# Patient Record
Sex: MALE | Race: WHITE | HISPANIC OR LATINO | URBAN - METROPOLITAN AREA
[De-identification: names, ages, dates, MRNs, and addresses within clinical notes are randomized per-mention and may not be internally consistent; named-entity substitution may affect disease eponyms.]

---

## 2019-05-11 ENCOUNTER — EMERGENCY (EMERGENCY)
Facility: HOSPITAL | Age: 84
LOS: 0 days | Discharge: HOME | End: 2019-05-11
Attending: EMERGENCY MEDICINE | Admitting: EMERGENCY MEDICINE
Payer: MEDICARE

## 2019-05-11 ENCOUNTER — TRANSCRIPTION ENCOUNTER (OUTPATIENT)
Age: 84
End: 2019-05-11

## 2019-05-11 VITALS
RESPIRATION RATE: 18 BRPM | SYSTOLIC BLOOD PRESSURE: 168 MMHG | HEART RATE: 66 BPM | TEMPERATURE: 98 F | DIASTOLIC BLOOD PRESSURE: 93 MMHG | OXYGEN SATURATION: 100 % | WEIGHT: 141.98 LBS | HEIGHT: 64 IN

## 2019-05-11 VITALS
DIASTOLIC BLOOD PRESSURE: 87 MMHG | HEART RATE: 64 BPM | RESPIRATION RATE: 18 BRPM | SYSTOLIC BLOOD PRESSURE: 151 MMHG | OXYGEN SATURATION: 100 % | TEMPERATURE: 98 F
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
## 2019-05-11 DIAGNOSIS — Z88.0 ALLERGY STATUS TO PENICILLIN: ICD-10-CM

## 2019-05-11 DIAGNOSIS — Z88.3 ALLERGY STATUS TO OTHER ANTI-INFECTIVE AGENTS: ICD-10-CM

## 2019-05-11 DIAGNOSIS — S09.90XA UNSPECIFIED INJURY OF HEAD, INITIAL ENCOUNTER: ICD-10-CM

## 2019-05-11 DIAGNOSIS — S59.902A UNSPECIFIED INJURY OF LEFT ELBOW, INITIAL ENCOUNTER: ICD-10-CM
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
## 2019-05-11 PROCEDURE — 72125 CT NECK SPINE W/O DYE: CPT | Mod: 26

## 2019-05-11 PROCEDURE — 70450 CT HEAD/BRAIN W/O DYE: CPT | Mod: 26

## 2019-05-11 PROCEDURE — 99284 EMERGENCY DEPT VISIT MOD MDM: CPT

## 2019-05-11 NOTE — ED ADULT NURSE NOTE - OBJECTIVE STATEMENT
Patient presents to ED complaining of fall. Patient had mechanical fall getting his mail this morning, patient states he fell on R side. Patient has skin abrasion to R forearm. Patient denies LOC, hitting head, dizziness, SOB, CP. Patient was seen in urgent care, sent to ED for further evaluation. A&0x3, ambulates without assistance, family at bedside

## 2019-05-11 NOTE — ED PROVIDER NOTE - OBJECTIVE STATEMENT
89 y/o M with no pertinent PMHx presenting to the ED s/p fall that occurred today.  +left arm pain. In the morning he wanted to get the mail,  when he was on his way he tripped backwards on the steps and hit his head and left arm. No LOC He  Was able to stand up and ambulate back inside, tell his son what happened. Patient soon after  went to urgent care and had wound on his left arm addressed, but they sent him here for evaluation of the head.  Patient is unsure if he is on blood thinners, but he does not believe he is. Denies any numbness, pain in any extremities. Tetanus UTD. PCD: Dr. Vela

## 2019-05-11 NOTE — ED ADULT TRIAGE NOTE - CHIEF COMPLAINT QUOTE
Pt sent from urgent care for evaluation for CT scan after trip/fall while getting the mail today.  Pt was treated for abrasion to right arm and advised to go to ED for CT scan of head. Pt is not currently on any anti-coagulation and denies LOC after the fall.

## 2019-05-11 NOTE — ED ADULT NURSE NOTE - NSIMPLEMENTINTERV_GEN_ALL_ED
Implemented All Fall with Harm Risk Interventions:  [place] to call system. Call bell, personal items and telephone within reach. Instruct patient to call for assistance. Room bathroom lighting operational. Non-slip footwear when patient is off stretcher. Physically safe environment: no spills, clutter or unnecessary equipment. Stretcher in lowest position, wheels locked, appropriate side rails in place. Provide visual cue, wrist band, yellow gown, etc. Monitor gait and stability. Monitor for mental status changes and reorient to person, place, and time. Review medications for side effects contributing to fall risk. Reinforce activity limits and safety measures with patient and family. Provide visual clues: red socks.

## 2019-05-11 NOTE — ED PROVIDER NOTE - NS_ ATTENDINGSCRIBEDETAILS _ED_A_ED_FT
I, Chris Hoawrd MD,  performed the initial face to face bedside interview with this patient regarding history of present illness, review of symptoms and relevant past medical, social and family history.  I completed an independent physical examination.    The history, relevant review of systems, past medical and surgical history, medical decision making, and physical examination was documented by the scribe in my presence and I attest to the accuracy of the documentation.

## 2019-06-01 ENCOUNTER — OUTPATIENT (OUTPATIENT)
Dept: OUTPATIENT SERVICES | Facility: HOSPITAL | Age: 84
LOS: 1 days | End: 2019-06-01
Payer: MEDICARE

## 2019-06-01 PROCEDURE — G9001: CPT

## 2019-06-10 DIAGNOSIS — Z71.89 OTHER SPECIFIED COUNSELING: ICD-10-CM

## 2021-08-13 ENCOUNTER — INPATIENT (INPATIENT)
Facility: HOSPITAL | Age: 86
LOS: 11 days | Discharge: NURSING, REHAB OR LONG-TERM CARE | DRG: 853 | End: 2021-08-25
Attending: STUDENT IN AN ORGANIZED HEALTH CARE EDUCATION/TRAINING PROGRAM | Admitting: FAMILY MEDICINE
Payer: MEDICARE

## 2021-08-13 VITALS
DIASTOLIC BLOOD PRESSURE: 56 MMHG | TEMPERATURE: 100 F | RESPIRATION RATE: 17 BRPM | HEART RATE: 77 BPM | WEIGHT: 160.06 LBS | HEIGHT: 64 IN | SYSTOLIC BLOOD PRESSURE: 133 MMHG | OXYGEN SATURATION: 97 %

## 2021-08-13 DIAGNOSIS — I10 ESSENTIAL (PRIMARY) HYPERTENSION: ICD-10-CM

## 2021-08-13 DIAGNOSIS — K44.9 DIAPHRAGMATIC HERNIA WITHOUT OBSTRUCTION OR GANGRENE: ICD-10-CM

## 2021-08-13 DIAGNOSIS — R50.9 FEVER, UNSPECIFIED: ICD-10-CM

## 2021-08-13 DIAGNOSIS — Z87.438 PERSONAL HISTORY OF OTHER DISEASES OF MALE GENITAL ORGANS: ICD-10-CM

## 2021-08-13 DIAGNOSIS — R33.9 RETENTION OF URINE, UNSPECIFIED: ICD-10-CM

## 2021-08-13 DIAGNOSIS — E78.5 HYPERLIPIDEMIA, UNSPECIFIED: ICD-10-CM

## 2021-08-13 DIAGNOSIS — G30.9 ALZHEIMER'S DISEASE, UNSPECIFIED: ICD-10-CM

## 2021-08-13 DIAGNOSIS — Z98.890 OTHER SPECIFIED POSTPROCEDURAL STATES: Chronic | ICD-10-CM

## 2021-08-13 DIAGNOSIS — R00.1 BRADYCARDIA, UNSPECIFIED: ICD-10-CM

## 2021-08-13 DIAGNOSIS — E11.9 TYPE 2 DIABETES MELLITUS WITHOUT COMPLICATIONS: ICD-10-CM

## 2021-08-13 LAB
ALBUMIN SERPL ELPH-MCNC: 3.1 G/DL — LOW (ref 3.3–5)
ALP SERPL-CCNC: 53 U/L — SIGNIFICANT CHANGE UP (ref 40–120)
ALT FLD-CCNC: 32 U/L — SIGNIFICANT CHANGE UP (ref 12–78)
ANION GAP SERPL CALC-SCNC: 3 MMOL/L — LOW (ref 5–17)
APPEARANCE UR: CLEAR — SIGNIFICANT CHANGE UP
APTT BLD: 30 SEC — SIGNIFICANT CHANGE UP (ref 27.5–35.5)
AST SERPL-CCNC: 30 U/L — SIGNIFICANT CHANGE UP (ref 15–37)
BACTERIA # UR AUTO: NEGATIVE — SIGNIFICANT CHANGE UP
BASOPHILS # BLD AUTO: 0.02 K/UL — SIGNIFICANT CHANGE UP (ref 0–0.2)
BASOPHILS NFR BLD AUTO: 0.2 % — SIGNIFICANT CHANGE UP (ref 0–2)
BILIRUB SERPL-MCNC: 0.6 MG/DL — SIGNIFICANT CHANGE UP (ref 0.2–1.2)
BILIRUB UR-MCNC: NEGATIVE — SIGNIFICANT CHANGE UP
BUN SERPL-MCNC: 32 MG/DL — HIGH (ref 7–23)
CALCIUM SERPL-MCNC: 9.6 MG/DL — SIGNIFICANT CHANGE UP (ref 8.5–10.1)
CHLORIDE SERPL-SCNC: 108 MMOL/L — SIGNIFICANT CHANGE UP (ref 96–108)
CO2 SERPL-SCNC: 26 MMOL/L — SIGNIFICANT CHANGE UP (ref 22–31)
COLOR SPEC: YELLOW — SIGNIFICANT CHANGE UP
CREAT SERPL-MCNC: 1.27 MG/DL — SIGNIFICANT CHANGE UP (ref 0.5–1.3)
DIFF PNL FLD: ABNORMAL
EOSINOPHIL # BLD AUTO: 0 K/UL — SIGNIFICANT CHANGE UP (ref 0–0.5)
EOSINOPHIL NFR BLD AUTO: 0 % — SIGNIFICANT CHANGE UP (ref 0–6)
EPI CELLS # UR: NEGATIVE — SIGNIFICANT CHANGE UP
GLUCOSE SERPL-MCNC: 106 MG/DL — HIGH (ref 70–99)
GLUCOSE UR QL: NEGATIVE MG/DL — SIGNIFICANT CHANGE UP
HCT VFR BLD CALC: 35.8 % — LOW (ref 39–50)
HGB BLD-MCNC: 12 G/DL — LOW (ref 13–17)
IMM GRANULOCYTES NFR BLD AUTO: 1.6 % — HIGH (ref 0–1.5)
INR BLD: 1.2 RATIO — HIGH (ref 0.88–1.16)
KETONES UR-MCNC: NEGATIVE — SIGNIFICANT CHANGE UP
LACTATE SERPL-SCNC: 1.6 MMOL/L — SIGNIFICANT CHANGE UP (ref 0.7–2)
LEUKOCYTE ESTERASE UR-ACNC: NEGATIVE — SIGNIFICANT CHANGE UP
LYMPHOCYTES # BLD AUTO: 0.23 K/UL — LOW (ref 1–3.3)
LYMPHOCYTES # BLD AUTO: 1.9 % — LOW (ref 13–44)
MCHC RBC-ENTMCNC: 33.4 PG — SIGNIFICANT CHANGE UP (ref 27–34)
MCHC RBC-ENTMCNC: 33.5 GM/DL — SIGNIFICANT CHANGE UP (ref 32–36)
MCV RBC AUTO: 99.7 FL — SIGNIFICANT CHANGE UP (ref 80–100)
MONOCYTES # BLD AUTO: 0.64 K/UL — SIGNIFICANT CHANGE UP (ref 0–0.9)
MONOCYTES NFR BLD AUTO: 5.2 % — SIGNIFICANT CHANGE UP (ref 2–14)
NEUTROPHILS # BLD AUTO: 11.3 K/UL — HIGH (ref 1.8–7.4)
NEUTROPHILS NFR BLD AUTO: 91.1 % — HIGH (ref 43–77)
NITRITE UR-MCNC: NEGATIVE — SIGNIFICANT CHANGE UP
NT-PROBNP SERPL-SCNC: 410 PG/ML — SIGNIFICANT CHANGE UP (ref 0–450)
PH UR: 6 — SIGNIFICANT CHANGE UP (ref 5–8)
PLATELET # BLD AUTO: 126 K/UL — LOW (ref 150–400)
POTASSIUM SERPL-MCNC: 4 MMOL/L — SIGNIFICANT CHANGE UP (ref 3.5–5.3)
POTASSIUM SERPL-SCNC: 4 MMOL/L — SIGNIFICANT CHANGE UP (ref 3.5–5.3)
PROT SERPL-MCNC: 6.3 GM/DL — SIGNIFICANT CHANGE UP (ref 6–8.3)
PROT UR-MCNC: 30 MG/DL
PROTHROM AB SERPL-ACNC: 13.8 SEC — HIGH (ref 10.6–13.6)
RAPID RVP RESULT: SIGNIFICANT CHANGE UP
RBC # BLD: 3.59 M/UL — LOW (ref 4.2–5.8)
RBC # FLD: 13 % — SIGNIFICANT CHANGE UP (ref 10.3–14.5)
RBC CASTS # UR COMP ASSIST: ABNORMAL /HPF (ref 0–4)
SARS-COV-2 RNA SPEC QL NAA+PROBE: SIGNIFICANT CHANGE UP
SODIUM SERPL-SCNC: 137 MMOL/L — SIGNIFICANT CHANGE UP (ref 135–145)
SP GR SPEC: 1.01 — SIGNIFICANT CHANGE UP (ref 1.01–1.02)
TROPONIN I SERPL-MCNC: <0.015 NG/ML — SIGNIFICANT CHANGE UP (ref 0.01–0.04)
UROBILINOGEN FLD QL: NEGATIVE MG/DL — SIGNIFICANT CHANGE UP
WBC # BLD: 12.39 K/UL — HIGH (ref 3.8–10.5)
WBC # FLD AUTO: 12.39 K/UL — HIGH (ref 3.8–10.5)
WBC UR QL: NEGATIVE — SIGNIFICANT CHANGE UP

## 2021-08-13 PROCEDURE — 82962 GLUCOSE BLOOD TEST: CPT

## 2021-08-13 PROCEDURE — 99223 1ST HOSP IP/OBS HIGH 75: CPT

## 2021-08-13 PROCEDURE — 74177 CT ABD & PELVIS W/CONTRAST: CPT | Mod: 26,MA

## 2021-08-13 PROCEDURE — 85610 PROTHROMBIN TIME: CPT

## 2021-08-13 PROCEDURE — 80048 BASIC METABOLIC PNL TOTAL CA: CPT

## 2021-08-13 PROCEDURE — 86140 C-REACTIVE PROTEIN: CPT

## 2021-08-13 PROCEDURE — 73600 X-RAY EXAM OF ANKLE: CPT | Mod: LT

## 2021-08-13 PROCEDURE — 93005 ELECTROCARDIOGRAM TRACING: CPT

## 2021-08-13 PROCEDURE — 86850 RBC ANTIBODY SCREEN: CPT

## 2021-08-13 PROCEDURE — 85730 THROMBOPLASTIN TIME PARTIAL: CPT

## 2021-08-13 PROCEDURE — 71045 X-RAY EXAM CHEST 1 VIEW: CPT

## 2021-08-13 PROCEDURE — 85027 COMPLETE CBC AUTOMATED: CPT

## 2021-08-13 PROCEDURE — 84145 PROCALCITONIN (PCT): CPT

## 2021-08-13 PROCEDURE — 87040 BLOOD CULTURE FOR BACTERIA: CPT

## 2021-08-13 PROCEDURE — 86901 BLOOD TYPING SEROLOGIC RH(D): CPT

## 2021-08-13 PROCEDURE — 86769 SARS-COV-2 COVID-19 ANTIBODY: CPT

## 2021-08-13 PROCEDURE — 87635 SARS-COV-2 COVID-19 AMP PRB: CPT

## 2021-08-13 PROCEDURE — 99285 EMERGENCY DEPT VISIT HI MDM: CPT | Mod: CS

## 2021-08-13 PROCEDURE — 93010 ELECTROCARDIOGRAM REPORT: CPT

## 2021-08-13 PROCEDURE — C1889: CPT

## 2021-08-13 PROCEDURE — 86900 BLOOD TYPING SEROLOGIC ABO: CPT

## 2021-08-13 PROCEDURE — 71045 X-RAY EXAM CHEST 1 VIEW: CPT | Mod: 26

## 2021-08-13 PROCEDURE — 97530 THERAPEUTIC ACTIVITIES: CPT | Mod: GP

## 2021-08-13 PROCEDURE — 33208 INSRT HEART PM ATRIAL & VENT: CPT | Mod: KX

## 2021-08-13 PROCEDURE — C1898: CPT

## 2021-08-13 PROCEDURE — 97116 GAIT TRAINING THERAPY: CPT | Mod: GP

## 2021-08-13 PROCEDURE — 70450 CT HEAD/BRAIN W/O DYE: CPT | Mod: 26,MA

## 2021-08-13 PROCEDURE — 84550 ASSAY OF BLOOD/URIC ACID: CPT

## 2021-08-13 PROCEDURE — 85025 COMPLETE CBC W/AUTO DIFF WBC: CPT

## 2021-08-13 PROCEDURE — 93306 TTE W/DOPPLER COMPLETE: CPT

## 2021-08-13 PROCEDURE — C1785: CPT

## 2021-08-13 PROCEDURE — 93971 EXTREMITY STUDY: CPT | Mod: LT

## 2021-08-13 PROCEDURE — 92610 EVALUATE SWALLOWING FUNCTION: CPT | Mod: GN

## 2021-08-13 PROCEDURE — 97162 PT EVAL MOD COMPLEX 30 MIN: CPT | Mod: GP

## 2021-08-13 PROCEDURE — C1894: CPT

## 2021-08-13 PROCEDURE — 85652 RBC SED RATE AUTOMATED: CPT

## 2021-08-13 PROCEDURE — 84443 ASSAY THYROID STIM HORMONE: CPT

## 2021-08-13 PROCEDURE — 71250 CT THORAX DX C-: CPT

## 2021-08-13 PROCEDURE — 71250 CT THORAX DX C-: CPT | Mod: 26

## 2021-08-13 PROCEDURE — 80053 COMPREHEN METABOLIC PANEL: CPT

## 2021-08-13 PROCEDURE — 36415 COLL VENOUS BLD VENIPUNCTURE: CPT

## 2021-08-13 RX ORDER — ACETAMINOPHEN 500 MG
650 TABLET ORAL ONCE
Refills: 0 | Status: COMPLETED | OUTPATIENT
Start: 2021-08-13 | End: 2021-08-13

## 2021-08-13 RX ORDER — METOCLOPRAMIDE HCL 10 MG
10 TABLET ORAL ONCE
Refills: 0 | Status: COMPLETED | OUTPATIENT
Start: 2021-08-13 | End: 2021-08-13

## 2021-08-13 RX ORDER — CEFTRIAXONE 500 MG/1
1000 INJECTION, POWDER, FOR SOLUTION INTRAMUSCULAR; INTRAVENOUS ONCE
Refills: 0 | Status: COMPLETED | OUTPATIENT
Start: 2021-08-13 | End: 2021-08-13

## 2021-08-13 RX ORDER — TIMOLOL 0.5 %
1 DROPS OPHTHALMIC (EYE)
Refills: 0 | Status: DISCONTINUED | OUTPATIENT
Start: 2021-08-13 | End: 2021-08-25

## 2021-08-13 RX ORDER — SODIUM CHLORIDE 9 MG/ML
1000 INJECTION INTRAMUSCULAR; INTRAVENOUS; SUBCUTANEOUS ONCE
Refills: 0 | Status: COMPLETED | OUTPATIENT
Start: 2021-08-13 | End: 2021-08-13

## 2021-08-13 RX ORDER — ATORVASTATIN CALCIUM 80 MG/1
40 TABLET, FILM COATED ORAL AT BEDTIME
Refills: 0 | Status: DISCONTINUED | OUTPATIENT
Start: 2021-08-13 | End: 2021-08-25

## 2021-08-13 RX ORDER — DIPHENHYDRAMINE HCL 50 MG
25 CAPSULE ORAL ONCE
Refills: 0 | Status: COMPLETED | OUTPATIENT
Start: 2021-08-13 | End: 2021-08-13

## 2021-08-13 RX ORDER — ENOXAPARIN SODIUM 100 MG/ML
40 INJECTION SUBCUTANEOUS DAILY
Refills: 0 | Status: DISCONTINUED | OUTPATIENT
Start: 2021-08-13 | End: 2021-08-25

## 2021-08-13 RX ORDER — AMLODIPINE BESYLATE 2.5 MG/1
5 TABLET ORAL DAILY
Refills: 0 | Status: DISCONTINUED | OUTPATIENT
Start: 2021-08-13 | End: 2021-08-25

## 2021-08-13 RX ORDER — AZITHROMYCIN 500 MG/1
500 TABLET, FILM COATED ORAL ONCE
Refills: 0 | Status: COMPLETED | OUTPATIENT
Start: 2021-08-13 | End: 2021-08-13

## 2021-08-13 RX ORDER — VALSARTAN 80 MG/1
160 TABLET ORAL DAILY
Refills: 0 | Status: DISCONTINUED | OUTPATIENT
Start: 2021-08-13 | End: 2021-08-25

## 2021-08-13 RX ORDER — FINASTERIDE 5 MG/1
5 TABLET, FILM COATED ORAL DAILY
Refills: 0 | Status: DISCONTINUED | OUTPATIENT
Start: 2021-08-13 | End: 2021-08-25

## 2021-08-13 RX ADMIN — AZITHROMYCIN 255 MILLIGRAM(S): 500 TABLET, FILM COATED ORAL at 22:27

## 2021-08-13 RX ADMIN — SODIUM CHLORIDE 1000 MILLILITER(S): 9 INJECTION INTRAMUSCULAR; INTRAVENOUS; SUBCUTANEOUS at 18:02

## 2021-08-13 RX ADMIN — SODIUM CHLORIDE 1000 MILLILITER(S): 9 INJECTION INTRAMUSCULAR; INTRAVENOUS; SUBCUTANEOUS at 22:18

## 2021-08-13 RX ADMIN — Medication 650 MILLIGRAM(S): at 19:22

## 2021-08-13 RX ADMIN — Medication 650 MILLIGRAM(S): at 18:02

## 2021-08-13 RX ADMIN — CEFTRIAXONE 1000 MILLIGRAM(S): 500 INJECTION, POWDER, FOR SOLUTION INTRAMUSCULAR; INTRAVENOUS at 22:27

## 2021-08-13 NOTE — H&P ADULT - NSICDXPASTMEDICALHX_GEN_ALL_CORE_FT
PAST MEDICAL HISTORY:  Bradycardia     OLIVER (dementia of Alzheimer type)     Diabetes     Glaucoma     History of BPH     HLD (hyperlipidemia)     HTN (hypertension)

## 2021-08-13 NOTE — ED PROVIDER NOTE - PROGRESS NOTE DETAILS
No obvious infectious source.  Will cover for pna, although cxr unclear.  Given AMS and febrile illness will admit for further w/u and management.  Not suspicious for meningitis as no reported ha, no meningeal signs, no rash, and not toxic appearing.  Further care per inpatient treatment team.

## 2021-08-13 NOTE — H&P ADULT - ASSESSMENT
89 y.o. male with PMHx of mild OLIVER, HTN, HLD, diet controlled DMII, BPH, Glaucoma p/w weakness since this am - progressed through the day to the point was hardly able to move - brought in by daughter and found to have fever 100.4 in ED with no definite source.    1. Fever - no definite source, abn CXR w/o significant change from past - will obtain CT chest to r/o infectious process though pt is not symptomatic.   - f/u UCx/BCx, dose of IV abx given by ED    2. Urinary retention with right groin pain - neg UA, neg CT abd - Rodriguez placed, monitor UO, Hx of BPH on proscar    3. Recent bradycardia with plan to f/u with Dr. Montesinos on monday - EKG was done in ED - unable to locate it, reordered   - potentially related to aricept - was stopped by dr. Rodney    4. HTN/HLD - resume regular meds    5. Glaucoma - timolol    6. DMII - diet controlled    7. VTE proph - LMWH    8. Large hiatal hernia on CXR and CT with stomach in chest - no symptoms reported    Discussed with daughter at bedside.  MOLST done.  Time spent 69 min

## 2021-08-13 NOTE — H&P ADULT - NSHPPHYSICALEXAM_GEN_ALL_CORE
PHYSICAL EXAM:    General: male in no acute distress  Eyes: PERRLA, EOMI; conjunctiva and sclera clear  Head: Normocephalic; atraumatic  ENMT: No nasal discharge; airway clear  Neck: Supple; non tender; no masses  Respiratory: No wheezes, rales or rhonchi  Cardiovascular: S1, S2 reg  Gastrointestinal: Soft non-tender non-distended; Normal bowel sounds  Genitourinary: No costovertebral angle tenderness, FQ in place with clean urine  Extremities: No clubbing, cyanosis or edema  Vascular: Peripheral pulses palpable 2+ bilaterally  Neurological: Alert and oriented to himself, place  Skin: Warm and dry.   Musculoskeletal: Normal gait, tone, without deformities  Psychiatric: Cooperative and appropriate

## 2021-08-13 NOTE — ED PROVIDER NOTE - CLINICAL SUMMARY MEDICAL DECISION MAKING FREE TEXT BOX
No suggestion of CVA at his time, symptom onset this morning and stroke scale is 0 at this time. Pt is febrile, so I suspect etiology of symptoms is related to infection rather than CVA. Pt is not in the window for any intervention of CVA since onset was this morning and stroke scale is 0. Will evaluate or further infection. Anticipate infection. No suggestion of CVA at his time, symptom onset this morning and stroke scale is 0 at this time. Pt is febrile, so I suspect etiology of symptoms is related to infection rather than neuro process. Pt is not in the window for any intervention of CVA in any event as onset was this morning and stroke scale is 0. Will evaluate or further infection. Anticipate admission.

## 2021-08-13 NOTE — ED ADULT TRIAGE NOTE - HEIGHT IN CM
-- DO NOT REPLY / DO NOT REPLY ALL --  -- Message is from the [facility]--    Provider paged via [employer] Documentation - The below message was copied and pasted from a [employer] page:      Initiated Date/Time 12/24/2020 8:57 am   Message Sent Date/Time 12/24/2020 8:58 am   Source [facility] Contact Center   Department [facility]   Phone Number [phone]   Method Text   Contacted Glenn Becker MD   Details [facility] Healthcare Provider Outpatient Urgent Matter   Message   [phone] [facility] URGENT URGENT CALLER NAME: DR BOLAÑOS RE: CRISTY MACHUCA PATIENT 1943 PATIENT PCP: GLENN BECKER DR TO DR PARRA. STATED ITS URGENT. PLEASE CALL.      [phone]

## 2021-08-13 NOTE — ED ADULT NURSE NOTE - OBJECTIVE STATEMENT
pt BIB EMS from home for complaints  of increased lethargy and weakness that began today at unspecified time. daughter states that pt woke up and was significantly changed from his baseline. daughter states that pt has been unable to keep his eyes open. daughter also notes that pt was complaining of pelvic pain. daughter notes slurred speech that began at 1400. hx alzheimers. and "slow heart rate" as per daughter and it supposed to have workup for pacemaker.

## 2021-08-13 NOTE — H&P ADULT - HISTORY OF PRESENT ILLNESS
89 y.o. male with PMHx of mild OLIVER, HTN, HLD, diet controlled DMII, BPH, Glaucoma p/w weakness since this am - progressed through the day to the point was hardly able to move - brought in by daughter and found to have fever 100.4 in ED with no definite source. Pt reported some pain now resolved in right groin and had difficulty to urinate requiring FQ placement.

## 2021-08-13 NOTE — ED ADULT NURSE NOTE - DOES PATIENT HAVE ADVANCE DIRECTIVE
Naldo Milton MD would like to see you back in 1 year.     It was nice to see you today. Take Care.      No

## 2021-08-13 NOTE — ED PROVIDER NOTE - OBJECTIVE STATEMENT
89 y/o male presenting with weakness worsening since this morning. Pt denies dysuria and coughing. Daughter describes it as listless. His words were slow to flow.+ Low abdominal pain earlier today.

## 2021-08-14 LAB
-  STREPTOCOCCUS SP. (NOT GRP A, B OR S PNEUMONIAE): SIGNIFICANT CHANGE UP
ANION GAP SERPL CALC-SCNC: 6 MMOL/L — SIGNIFICANT CHANGE UP (ref 5–17)
BUN SERPL-MCNC: 31 MG/DL — HIGH (ref 7–23)
CALCIUM SERPL-MCNC: 9.3 MG/DL — SIGNIFICANT CHANGE UP (ref 8.5–10.1)
CHLORIDE SERPL-SCNC: 109 MMOL/L — HIGH (ref 96–108)
CO2 SERPL-SCNC: 25 MMOL/L — SIGNIFICANT CHANGE UP (ref 22–31)
COVID-19 SPIKE DOMAIN AB INTERP: POSITIVE
COVID-19 SPIKE DOMAIN ANTIBODY RESULT: 45.9 U/ML — HIGH
CREAT SERPL-MCNC: 1.33 MG/DL — HIGH (ref 0.5–1.3)
GLUCOSE SERPL-MCNC: 109 MG/DL — HIGH (ref 70–99)
GRAM STN FLD: SIGNIFICANT CHANGE UP
HCT VFR BLD CALC: 32.9 % — LOW (ref 39–50)
HGB BLD-MCNC: 10.9 G/DL — LOW (ref 13–17)
MCHC RBC-ENTMCNC: 33 PG — SIGNIFICANT CHANGE UP (ref 27–34)
MCHC RBC-ENTMCNC: 33.1 GM/DL — SIGNIFICANT CHANGE UP (ref 32–36)
MCV RBC AUTO: 99.7 FL — SIGNIFICANT CHANGE UP (ref 80–100)
METHOD TYPE: SIGNIFICANT CHANGE UP
PLATELET # BLD AUTO: 114 K/UL — LOW (ref 150–400)
POTASSIUM SERPL-MCNC: 3.6 MMOL/L — SIGNIFICANT CHANGE UP (ref 3.5–5.3)
POTASSIUM SERPL-SCNC: 3.6 MMOL/L — SIGNIFICANT CHANGE UP (ref 3.5–5.3)
PROCALCITONIN SERPL-MCNC: 5.91 NG/ML — HIGH (ref 0.02–0.1)
RBC # BLD: 3.3 M/UL — LOW (ref 4.2–5.8)
RBC # FLD: 13.3 % — SIGNIFICANT CHANGE UP (ref 10.3–14.5)
SARS-COV-2 IGG+IGM SERPL QL IA: 45.9 U/ML — HIGH
SARS-COV-2 IGG+IGM SERPL QL IA: POSITIVE
SODIUM SERPL-SCNC: 140 MMOL/L — SIGNIFICANT CHANGE UP (ref 135–145)
SPECIMEN SOURCE: SIGNIFICANT CHANGE UP
WBC # BLD: 12.07 K/UL — HIGH (ref 3.8–10.5)
WBC # FLD AUTO: 12.07 K/UL — HIGH (ref 3.8–10.5)

## 2021-08-14 PROCEDURE — 93010 ELECTROCARDIOGRAM REPORT: CPT

## 2021-08-14 RX ORDER — CEFEPIME 1 G/1
1000 INJECTION, POWDER, FOR SOLUTION INTRAMUSCULAR; INTRAVENOUS DAILY
Refills: 0 | Status: DISCONTINUED | OUTPATIENT
Start: 2021-08-14 | End: 2021-08-19

## 2021-08-14 RX ORDER — ACETAMINOPHEN 500 MG
650 TABLET ORAL EVERY 6 HOURS
Refills: 0 | Status: DISCONTINUED | OUTPATIENT
Start: 2021-08-14 | End: 2021-08-25

## 2021-08-14 RX ADMIN — Medication 1 DROP(S): at 11:20

## 2021-08-14 RX ADMIN — ATORVASTATIN CALCIUM 40 MILLIGRAM(S): 80 TABLET, FILM COATED ORAL at 21:41

## 2021-08-14 RX ADMIN — ENOXAPARIN SODIUM 40 MILLIGRAM(S): 100 INJECTION SUBCUTANEOUS at 09:49

## 2021-08-14 RX ADMIN — Medication 650 MILLIGRAM(S): at 22:10

## 2021-08-14 RX ADMIN — Medication 1 DROP(S): at 21:42

## 2021-08-14 RX ADMIN — FINASTERIDE 5 MILLIGRAM(S): 5 TABLET, FILM COATED ORAL at 09:50

## 2021-08-14 NOTE — PROVIDER CONTACT NOTE (CRITICAL VALUE NOTIFICATION) - SITUATION
PA. Best informed of (+) Blood culture results for draw on 8/13/21. Growth in anaerobic bottle for gram (+) coccyx in pairs and chains.

## 2021-08-14 NOTE — PROVIDER CONTACT NOTE (CRITICAL VALUE NOTIFICATION) - ACTION/TREATMENT ORDERED:
NO action at this time. Pt is being followed by ID and is on IV antobiotics. PA stated that this abx should cover.

## 2021-08-14 NOTE — PROGRESS NOTE ADULT - SUBJECTIVE AND OBJECTIVE BOX
Subjective:  no distress  non verbal daughter at bedside    MEDICATIONS  (STANDING):  amLODIPine   Tablet 5 milliGRAM(s) Oral daily  atorvastatin 40 milliGRAM(s) Oral at bedtime  enoxaparin Injectable 40 milliGRAM(s) SubCutaneous daily  finasteride 5 milliGRAM(s) Oral daily  timolol 0.5% Solution 1 Drop(s) Both EYES two times a day  valsartan 160 milliGRAM(s) Oral daily    MEDICATIONS  (PRN):      Allergies    penicillins (Other)  tetracyclines (Other)    Intolerances        REVIEW OF SYSTEMS: patient unable        Vital Signs Last 24 Hrs  T(C): 36.8 (14 Aug 2021 08:09), Max: 38 (13 Aug 2021 16:06)  T(F): 98.2 (14 Aug 2021 08:09), Max: 100.4 (13 Aug 2021 16:06)  HR: 56 (14 Aug 2021 08:09) (56 - 80)  BP: 118/43 (14 Aug 2021 08:09) (109/46 - 133/56)  BP(mean): 60 (13 Aug 2021 23:39) (60 - 60)  RR: 16 (14 Aug 2021 08:09) (16 - 17)  SpO2: 92% (14 Aug 2021 08:09) (92% - 97%)    PHYSICAL EXAMINATION:  SKIN: no rashes  HEAD: NC/AT  EYES: PERRLA, EOMI  EARS: TM's intact  NOSE: no abnormalities  NECK:  Supple. No lymphadenopathy. Jugular venous pressure not elevated. Carotids equal.   HEART:   S1S2 reg  CHEST:  decreased BS left base w crackles  ABDOMEN:  Soft and nontender.   EXTREMITIES:  no C/C/E  NEURO: aphasic      LABS:                        10.9   12.07 )-----------( 114      ( 14 Aug 2021 08:10 )             32.9     08-14    140  |  109<H>  |  31<H>  ----------------------------<  109<H>  3.6   |  25  |  1.33<H>    Ca    9.3      14 Aug 2021 08:10    TPro  6.3  /  Alb  3.1<L>  /  TBili  0.6  /  DBili  x   /  AST  30  /  ALT  32  /  AlkPhos  53  08-13    PT/INR - ( 13 Aug 2021 22:24 )   PT: 13.8 sec;   INR: 1.20 ratio         PTT - ( 13 Aug 2021 22:24 )  PTT:30.0 sec  Urinalysis Basic - ( 13 Aug 2021 18:23 )    Color: Yellow / Appearance: Clear / S.010 / pH: x  Gluc: x / Ketone: Negative  / Bili: Negative / Urobili: Negative mg/dL   Blood: x / Protein: 30 mg/dL / Nitrite: Negative   Leuk Esterase: Negative / RBC: 3-5 /HPF / WBC Negative   Sq Epi: x / Non Sq Epi: Negative / Bacteria: Negative        RADIOLOGY & ADDITIONAL TESTS:

## 2021-08-14 NOTE — PATIENT PROFILE ADULT - NSPROGENSOURCEINFO_GEN_A_NUR
[de-identified] : Acne;  now on  BID, topicals;  still not doing well, having outbreaks on face, back;  patient

## 2021-08-14 NOTE — PROGRESS NOTE ADULT - ASSESSMENT
- CT scan shows large left hiatal hernia with LLL infiltrate/atelectasis  - probable aspiration pneumonia  - will start cefepime  - labs in am  - dvt proph

## 2021-08-14 NOTE — PROGRESS NOTE ADULT - ASSESSMENT
- ct scan show large left sided hiatal hernia w LLL infiltrate/atelectasis  - wbc elevated w fever  - ID to evaluate for abx  - labs in am

## 2021-08-14 NOTE — CONSULT NOTE ADULT - ASSESSMENT
89 y.o. male with PMHx of mild Dementia, HTN, HLD, diet controlled DMII, BPH, Glaucoma admitted on 8/13 for evaluation of progressive weakness on day of admission, then fevers; patient mildly coughing, history per medical record as patient is unable to provide history.    1. Patient admitted with pneumonia, most likely micro aspiration pneumonia, also noted with leukocytosis most likely reactive to infection  - follow up cultures   - serial cbc and monitor temperature   - reviewed prior medical records to evaluate for resistant or atypical pathogens   - oxygen and nebs as needed   - will optimize antibiotics to cefepime which should be tolerated with penicillin allergy which is remote and most likely due to cogeners in older formulations  - Monitor closely in view of history of penicillin allergy   2. other issues: per medicine

## 2021-08-14 NOTE — PROGRESS NOTE ADULT - SUBJECTIVE AND OBJECTIVE BOX
Subjective:  no acute distress  non verbal  daughter at bedside  had temp 101 at 3am    MEDICATIONS  (STANDING):  amLODIPine   Tablet 5 milliGRAM(s) Oral daily  atorvastatin 40 milliGRAM(s) Oral at bedtime  enoxaparin Injectable 40 milliGRAM(s) SubCutaneous daily  finasteride 5 milliGRAM(s) Oral daily  timolol 0.5% Solution 1 Drop(s) Both EYES two times a day  valsartan 160 milliGRAM(s) Oral daily    MEDICATIONS  (PRN):      Allergies    penicillins (Other)  tetracyclines (Other)    Intolerances        REVIEW OF SYSTEMS: pt unable        Vital Signs Last 24 Hrs  T(C): 36.8 (14 Aug 2021 08:09), Max: 38 (13 Aug 2021 16:06)  T(F): 98.2 (14 Aug 2021 08:09), Max: 100.4 (13 Aug 2021 16:06)  HR: 56 (14 Aug 2021 08:09) (56 - 80)  BP: 118/43 (14 Aug 2021 08:09) (109/46 - 133/56)  BP(mean): 60 (13 Aug 2021 23:39) (60 - 60)  RR: 16 (14 Aug 2021 08:09) (16 - 17)  SpO2: 92% (14 Aug 2021 08:09) (92% - 97%)    PHYSICAL EXAMINATION:  SKIN: no rashes  HEAD: NC/AT  EYES: PERRLA, EOMI  EARS: TM's intact  NOSE: no abnormalities  NECK:  Supple. No lymphadenopathy. Jugular venous pressure not elevated. Carotids equal.   HEART:   S1S2 reg  CHEST: decreased bs left base  ABDOMEN:  Soft and nontender.   EXTREMITIES:  no C/C/E  NEURO: AAO x 3, no focal deficts       LABS:                        10.9   12.07 )-----------( 114      ( 14 Aug 2021 08:10 )             32.9     08-14    140  |  109<H>  |  31<H>  ----------------------------<  109<H>  3.6   |  25  |  1.33<H>    Ca    9.3      14 Aug 2021 08:10    TPro  6.3  /  Alb  3.1<L>  /  TBili  0.6  /  DBili  x   /  AST  30  /  ALT  32  /  AlkPhos  53  08-13    PT/INR - ( 13 Aug 2021 22:24 )   PT: 13.8 sec;   INR: 1.20 ratio         PTT - ( 13 Aug 2021 22:24 )  PTT:30.0 sec  Urinalysis Basic - ( 13 Aug 2021 18:23 )    Color: Yellow / Appearance: Clear / S.010 / pH: x  Gluc: x / Ketone: Negative  / Bili: Negative / Urobili: Negative mg/dL   Blood: x / Protein: 30 mg/dL / Nitrite: Negative   Leuk Esterase: Negative / RBC: 3-5 /HPF / WBC Negative   Sq Epi: x / Non Sq Epi: Negative / Bacteria: Negative        RADIOLOGY & ADDITIONAL TESTS:

## 2021-08-15 DIAGNOSIS — K59.00 CONSTIPATION, UNSPECIFIED: ICD-10-CM

## 2021-08-15 LAB
ALBUMIN SERPL ELPH-MCNC: 2.4 G/DL — LOW (ref 3.3–5)
ALP SERPL-CCNC: 45 U/L — SIGNIFICANT CHANGE UP (ref 40–120)
ALT FLD-CCNC: 40 U/L — SIGNIFICANT CHANGE UP (ref 12–78)
ANION GAP SERPL CALC-SCNC: 4 MMOL/L — LOW (ref 5–17)
AST SERPL-CCNC: 38 U/L — HIGH (ref 15–37)
BASOPHILS # BLD AUTO: 0.01 K/UL — SIGNIFICANT CHANGE UP (ref 0–0.2)
BASOPHILS NFR BLD AUTO: 0.1 % — SIGNIFICANT CHANGE UP (ref 0–2)
BILIRUB SERPL-MCNC: 0.5 MG/DL — SIGNIFICANT CHANGE UP (ref 0.2–1.2)
BUN SERPL-MCNC: 40 MG/DL — HIGH (ref 7–23)
CALCIUM SERPL-MCNC: 9.6 MG/DL — SIGNIFICANT CHANGE UP (ref 8.5–10.1)
CHLORIDE SERPL-SCNC: 107 MMOL/L — SIGNIFICANT CHANGE UP (ref 96–108)
CO2 SERPL-SCNC: 26 MMOL/L — SIGNIFICANT CHANGE UP (ref 22–31)
CREAT SERPL-MCNC: 1.62 MG/DL — HIGH (ref 0.5–1.3)
EOSINOPHIL # BLD AUTO: 0.02 K/UL — SIGNIFICANT CHANGE UP (ref 0–0.5)
EOSINOPHIL NFR BLD AUTO: 0.2 % — SIGNIFICANT CHANGE UP (ref 0–6)
GLUCOSE SERPL-MCNC: 100 MG/DL — HIGH (ref 70–99)
HCT VFR BLD CALC: 33.9 % — LOW (ref 39–50)
HGB BLD-MCNC: 11.3 G/DL — LOW (ref 13–17)
IMM GRANULOCYTES NFR BLD AUTO: 0.6 % — SIGNIFICANT CHANGE UP (ref 0–1.5)
LYMPHOCYTES # BLD AUTO: 0.66 K/UL — LOW (ref 1–3.3)
LYMPHOCYTES # BLD AUTO: 5.9 % — LOW (ref 13–44)
MCHC RBC-ENTMCNC: 32.9 PG — SIGNIFICANT CHANGE UP (ref 27–34)
MCHC RBC-ENTMCNC: 33.3 GM/DL — SIGNIFICANT CHANGE UP (ref 32–36)
MCV RBC AUTO: 98.8 FL — SIGNIFICANT CHANGE UP (ref 80–100)
MONOCYTES # BLD AUTO: 0.74 K/UL — SIGNIFICANT CHANGE UP (ref 0–0.9)
MONOCYTES NFR BLD AUTO: 6.6 % — SIGNIFICANT CHANGE UP (ref 2–14)
NEUTROPHILS # BLD AUTO: 9.74 K/UL — HIGH (ref 1.8–7.4)
NEUTROPHILS NFR BLD AUTO: 86.6 % — HIGH (ref 43–77)
PLATELET # BLD AUTO: 117 K/UL — LOW (ref 150–400)
POTASSIUM SERPL-MCNC: 3.7 MMOL/L — SIGNIFICANT CHANGE UP (ref 3.5–5.3)
POTASSIUM SERPL-SCNC: 3.7 MMOL/L — SIGNIFICANT CHANGE UP (ref 3.5–5.3)
PROT SERPL-MCNC: 5.6 GM/DL — LOW (ref 6–8.3)
RBC # BLD: 3.43 M/UL — LOW (ref 4.2–5.8)
RBC # FLD: 13.7 % — SIGNIFICANT CHANGE UP (ref 10.3–14.5)
SODIUM SERPL-SCNC: 137 MMOL/L — SIGNIFICANT CHANGE UP (ref 135–145)
WBC # BLD: 11.24 K/UL — HIGH (ref 3.8–10.5)
WBC # FLD AUTO: 11.24 K/UL — HIGH (ref 3.8–10.5)

## 2021-08-15 RX ADMIN — ENOXAPARIN SODIUM 40 MILLIGRAM(S): 100 INJECTION SUBCUTANEOUS at 08:30

## 2021-08-15 RX ADMIN — Medication 1 DROP(S): at 08:30

## 2021-08-15 RX ADMIN — FINASTERIDE 5 MILLIGRAM(S): 5 TABLET, FILM COATED ORAL at 08:30

## 2021-08-15 RX ADMIN — ATORVASTATIN CALCIUM 40 MILLIGRAM(S): 80 TABLET, FILM COATED ORAL at 22:13

## 2021-08-15 RX ADMIN — Medication 5 MILLIGRAM(S): at 16:11

## 2021-08-15 RX ADMIN — CEFEPIME 1000 MILLIGRAM(S): 1 INJECTION, POWDER, FOR SOLUTION INTRAMUSCULAR; INTRAVENOUS at 08:29

## 2021-08-15 RX ADMIN — Medication 1 DROP(S): at 22:14

## 2021-08-15 NOTE — SWALLOW BEDSIDE ASSESSMENT ADULT - SWALLOW EVAL: CRITERIA FOR SKILLED INTERVENTION MET
will follow for tolerance of rec diet coonsitiency/no problems identified which require skilled intervention

## 2021-08-15 NOTE — SWALLOW BEDSIDE ASSESSMENT ADULT - COMMENTS
89 y.o. male with PMHx of mild OLIVER, HTN, HLD, diet controlled DMII, BPH, Glaucoma p/w weakness since this am - progressed through the day to the point was hardly able to move - brought in by daughter and found to have fever 100.4 in ED with no definite source. Pt reported some pain now resolved in right groin and had difficulty to urinate requiring FQ placement.  Note also report of large hiatal hernia.

## 2021-08-15 NOTE — SWALLOW BEDSIDE ASSESSMENT ADULT - PHARYNGEAL PHASE
age-appropriate timely onset of pharyngeal swallow  with observable larybgeal lift and no overt s/s aspiration, no coughing post swallow.  Mild oral debris post swallow.

## 2021-08-15 NOTE — SWALLOW BEDSIDE ASSESSMENT ADULT - SLP GENERAL OBSERVATIONS
pt semi recumbent in bed, awake and alert, able to converse in English to some extent. likely able to make simple needs known.

## 2021-08-15 NOTE — SWALLOW BEDSIDE ASSESSMENT ADULT - SPECIFY REASON(S)
as per note, dtr states pt is coughing with meals.  as per dtr in conversation, pt is not coughing, but has no enough teethb to chew

## 2021-08-15 NOTE — PROGRESS NOTE ADULT - SUBJECTIVE AND OBJECTIVE BOX
Subjective:  awake  daughter at bedside  no BM in 5 days    MEDICATIONS  (STANDING):  amLODIPine   Tablet 5 milliGRAM(s) Oral daily  atorvastatin 40 milliGRAM(s) Oral at bedtime  cefepime  Injectable. 1000 milliGRAM(s) IV Push daily  enoxaparin Injectable 40 milliGRAM(s) SubCutaneous daily  finasteride 5 milliGRAM(s) Oral daily  timolol 0.5% Solution 1 Drop(s) Both EYES two times a day  valsartan 160 milliGRAM(s) Oral daily    MEDICATIONS  (PRN):  acetaminophen    Suspension .. 650 milliGRAM(s) Oral every 6 hours PRN Temp greater or equal to 38C (100.4F), Moderate Pain (4 - 6)      Allergies    penicillins (Other)  tetracyclines (Other)    Intolerances        REVIEW OF SYSTEMS:    CONSTITUTIONAL:  As per HPI.  HEENT:  Eyes:  No diplopia or blurred vision. ENT:  No earache, sore throat or runny nose.  CARDIOVASCULAR:  No pressure, squeezing, tightness, heaviness or aching about the chest, neck, axilla or epigastrium.  RESPIRATORY:  No cough, shortness of breath, PND or orthopnea.  GASTROINTESTINAL:  No nausea, vomiting or diarrhea.  GENITOURINARY:  No dysuria, frequency or urgency.  MUSCULOSKELETAL:  no joint pain, deformity, tenderness  EXTREMITIES: no clubbing cyanosis,edema  SKIN:  No change in skin, hair or nails.  NEUROLOGIC:  No paresthesias, fasciculations, seizures or weakness.  PSYCHIATRIC:  No disorder of thought or mood.  ENDOCRINE:  No heat or cold intolerance, polyuria or polydipsia.  HEMATOLOGICAL:  No easy bruising or bleedings:    Vital Signs Last 24 Hrs  T(C): 37.1 (15 Aug 2021 07:43), Max: 38.2 (14 Aug 2021 21:39)  T(F): 98.7 (15 Aug 2021 07:43), Max: 100.8 (14 Aug 2021 21:39)  HR: 53 (15 Aug 2021 07:43) (53 - 66)  BP: 124/51 (15 Aug 2021 07:43) (106/44 - 124/51)  BP(mean): --  RR: 17 (15 Aug 2021 07:43) (16 - 18)  SpO2: 95% (15 Aug 2021 07:43) (94% - 96%)    PHYSICAL EXAMINATION:  SKIN: no rashes  HEAD: NC/AT  EYES: PERRLA, EOMI  EARS: TM's intact  NOSE: no abnormalities  NECK:  Supple. No lymphadenopathy. Jugular venous pressure not elevated. Carotids equal.   HEART:  S1S2 reg  CHEST:  Chest is clear to auscultation. Normal respiratory effort.  ABDOMEN:  Soft and nontender.   EXTREMITIES:  no C/C/E  NEURO: awake      LABS:                        11.3   11.24 )-----------( 117      ( 15 Aug 2021 08:10 )             33.9     08-15    137  |  107  |  40<H>  ----------------------------<  100<H>  3.7   |  26  |  1.62<H>    Ca    9.6      15 Aug 2021 08:10    TPro  5.6<L>  /  Alb  2.4<L>  /  TBili  0.5  /  DBili  x   /  AST  38<H>  /  ALT  40  /  AlkPhos  45  08-15    PT/INR - ( 13 Aug 2021 22:24 )   PT: 13.8 sec;   INR: 1.20 ratio         PTT - ( 13 Aug 2021 22:24 )  PTT:30.0 sec  Urinalysis Basic - ( 13 Aug 2021 18:23 )    Color: Yellow / Appearance: Clear / S.010 / pH: x  Gluc: x / Ketone: Negative  / Bili: Negative / Urobili: Negative mg/dL   Blood: x / Protein: 30 mg/dL / Nitrite: Negative   Leuk Esterase: Negative / RBC: 3-5 /HPF / WBC Negative   Sq Epi: x / Non Sq Epi: Negative / Bacteria: Negative        RADIOLOGY & ADDITIONAL TESTS:

## 2021-08-15 NOTE — PROGRESS NOTE ADULT - ASSESSMENT
- ct scan show large left sided hiatal hernia w LLL infiltrate/atelectasis  - wbc elevated; afebrile today  - on cefepime  - daughter says patient has some cough w eating. Will get speech and swallow eval  - was scheduled for outpatient EP eval; will consult Dr. Guevara  - dvt proph

## 2021-08-15 NOTE — PROGRESS NOTE ADULT - ASSESSMENT
89 y.o. male with PMHx of mild Dementia, HTN, HLD, diet controlled DMII, BPH, Glaucoma admitted on 8/13 for evaluation of progressive weakness on day of admission, then fevers; patient mildly coughing, history per medical record as patient is unable to provide history.    1. 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 other issues: per medicine

## 2021-08-15 NOTE — SWALLOW BEDSIDE ASSESSMENT ADULT - NS SPL SWALLOW CLINIC TRIAL FT
Disc with Dtr.  There is no oral-pharyngeal contraindication for regular consistency, as long as softer moister options are chosen and Pt cleans mouth post meal.  Maintain thin liquids. meds as tolerated.  disc with NSg.   Service will follow peripherally.

## 2021-08-15 NOTE — SWALLOW BEDSIDE ASSESSMENT ADULT - SWALLOW EVAL: RECOMMENDED FEEDING/EATING TECHNIQUES
allow for swallow between intakes/alternate food with liquid/check mouth frequently for oral residue/pocketing/hard swallow w/ each bite or sip/maintain upright posture during/after eating for 30 mins

## 2021-08-15 NOTE — PROGRESS NOTE ADULT - SUBJECTIVE AND OBJECTIVE BOX
Date of service: 08-15-21 @ 14:59    Patient lying in bed; afebrile, patient is constipated by daughters account        ROS: unable to obtain secondary to patient medical condition     MEDICATIONS  (STANDING):  amLODIPine   Tablet 5 milliGRAM(s) Oral daily  atorvastatin 40 milliGRAM(s) Oral at bedtime  cefepime  Injectable. 1000 milliGRAM(s) IV Push daily  enoxaparin Injectable 40 milliGRAM(s) SubCutaneous daily  finasteride 5 milliGRAM(s) Oral daily  timolol 0.5% Solution 1 Drop(s) Both EYES two times a day  valsartan 160 milliGRAM(s) Oral daily    MEDICATIONS  (PRN):  acetaminophen    Suspension .. 650 milliGRAM(s) Oral every 6 hours PRN Temp greater or equal to 38C (100.4F), Moderate Pain (4 - 6)  bisacodyl 5 milliGRAM(s) Oral every 12 hours PRN Constipation      Vital Signs Last 24 Hrs  T(C): 37.1 (15 Aug 2021 07:43), Max: 38.2 (14 Aug 2021 21:39)  T(F): 98.7 (15 Aug 2021 07:43), Max: 100.8 (14 Aug 2021 21:39)  HR: 53 (15 Aug 2021 07:43) (53 - 66)  BP: 124/51 (15 Aug 2021 07:43) (106/44 - 124/51)  BP(mean): --  RR: 17 (15 Aug 2021 07:43) (16 - 18)  SpO2: 95% (15 Aug 2021 07:43) (94% - 96%)        Physical Exam:          Constitutional: frail looking  HEENT: NC/AT, EOMI, PERRLA, conjunctivae clear; ears and nose atraumatic; pharynx clear  Neck: supple; thyroid not palpable  Back: no tenderness  Respiratory: respiratory effort normal; clear to auscultation, diminished breath sounds at bases  Cardiovascular: S1S2 regular, no murmurs  Abdomen: soft, not tender, not distended, positive BS; no liver or spleen organomegaly  Genitourinary: no suprapubic tenderness  Musculoskeletal: no muscle tenderness, no joint swelling or tenderness  Neurological/ Psychiatric:  moving all extremities  Skin: no rashes; no palpable lesions    Labs: all available labs reviewed                      Labs:                        11.3   11.24 )-----------( 117      ( 15 Aug 2021 08:10 )             33.9     08-15    137  |  107  |  40<H>  ----------------------------<  100<H>  3.7   |  26  |  1.62<H>    Ca    9.6      15 Aug 2021 08:10    TPro  5.6<L>  /  Alb  2.4<L>  /  TBili  0.5  /  DBili  x   /  AST  38<H>  /  ALT  40  /  AlkPhos  45  08-15           Cultures:       Culture - Blood (collected 08-13-21 @ 17:00)  Source: .Blood None  Gram Stain (08-14-21 @ 21:48):    Growth in anaerobic bottle: Gram Positive Cocci in Pairs and Chains  Preliminary Report (08-14-21 @ 21:48):    Growth in anaerobic bottle: Gram Positive Cocci in Pairs and Chains    ***Blood Panel PCR results on this specimen are available    approximately 3 hours after the Gram stain result.***    Gram stain, PCR, and/or culture results may not always    correspond due to difference in methodologies.    ************************************************************    This PCR assay was performed by multiplex PCR. This    Assay tests for 66 bacterial and resistance gene targets.    Please refer to the [facility] test directory    at [email or web address] for details.  Organism: Blood Culture PCR (08-14-21 @ 23:21)  Organism: Blood Culture PCR (08-14-21 @ 23:21)      -  Streptococcus sp. (Not Grp A, B or S pneumoniae): Detec      Method Type: PCR    Culture - Blood (collected 08-13-21 @ 17:00)  Source: .Blood None  Preliminary Report (08-14-21 @ 23:02):    No growth to date.              < from: CT Chest No Cont (08.13.21 @ 23:29) >  Mild compressive atelectasis at the left base secondary to large hiatal hernia. The lungs are otherwise clear.    < end of copied text >        Radiology: all available radiological tests reviewed    Advanced directives addressed: DNR

## 2021-08-15 NOTE — SWALLOW BEDSIDE ASSESSMENT ADULT - ORAL PREPARATORY PHASE
orientation: oral acceptance and containment. No anterior or lateral loss. lip seal on utensil  Served himeslf thin liquid in multiple successive swallows./Within functional limits

## 2021-08-15 NOTE — SWALLOW BEDSIDE ASSESSMENT ADULT - SWALLOW EVAL: DIAGNOSIS
mild oral dysphagia 2/2 to lack of full dentition: bilaterally mandibular molars missing; upper teeth mostly present.  Dtr states she wa sin the process of getting dentures before Covid. and will resume after this admission.

## 2021-08-16 ENCOUNTER — APPOINTMENT (OUTPATIENT)
Dept: ELECTROPHYSIOLOGY | Facility: CLINIC | Age: 86
End: 2021-08-16

## 2021-08-16 DIAGNOSIS — L03.116 CELLULITIS OF LEFT LOWER LIMB: ICD-10-CM

## 2021-08-16 DIAGNOSIS — F05 DELIRIUM DUE TO KNOWN PHYSIOLOGICAL CONDITION: ICD-10-CM

## 2021-08-16 LAB
-  CEFTRIAXONE: SIGNIFICANT CHANGE UP
-  CLINDAMYCIN: SIGNIFICANT CHANGE UP
-  LEVOFLOXACIN: SIGNIFICANT CHANGE UP
-  PENICILLIN: SIGNIFICANT CHANGE UP
-  VANCOMYCIN: SIGNIFICANT CHANGE UP
ALBUMIN SERPL ELPH-MCNC: 2.2 G/DL — LOW (ref 3.3–5)
ALP SERPL-CCNC: 49 U/L — SIGNIFICANT CHANGE UP (ref 40–120)
ALT FLD-CCNC: 106 U/L — HIGH (ref 12–78)
ANION GAP SERPL CALC-SCNC: 4 MMOL/L — LOW (ref 5–17)
AST SERPL-CCNC: 92 U/L — HIGH (ref 15–37)
BILIRUB SERPL-MCNC: 0.4 MG/DL — SIGNIFICANT CHANGE UP (ref 0.2–1.2)
BUN SERPL-MCNC: 33 MG/DL — HIGH (ref 7–23)
CALCIUM SERPL-MCNC: 9.4 MG/DL — SIGNIFICANT CHANGE UP (ref 8.5–10.1)
CHLORIDE SERPL-SCNC: 108 MMOL/L — SIGNIFICANT CHANGE UP (ref 96–108)
CO2 SERPL-SCNC: 25 MMOL/L — SIGNIFICANT CHANGE UP (ref 22–31)
CREAT SERPL-MCNC: 1.16 MG/DL — SIGNIFICANT CHANGE UP (ref 0.5–1.3)
CULTURE RESULTS: SIGNIFICANT CHANGE UP
CULTURE RESULTS: SIGNIFICANT CHANGE UP
GLUCOSE SERPL-MCNC: 94 MG/DL — SIGNIFICANT CHANGE UP (ref 70–99)
HCT VFR BLD CALC: 33.7 % — LOW (ref 39–50)
HGB BLD-MCNC: 11.4 G/DL — LOW (ref 13–17)
MCHC RBC-ENTMCNC: 33.3 PG — SIGNIFICANT CHANGE UP (ref 27–34)
MCHC RBC-ENTMCNC: 33.8 GM/DL — SIGNIFICANT CHANGE UP (ref 32–36)
MCV RBC AUTO: 98.5 FL — SIGNIFICANT CHANGE UP (ref 80–100)
METHOD TYPE: SIGNIFICANT CHANGE UP
METHOD TYPE: SIGNIFICANT CHANGE UP
ORGANISM # SPEC MICROSCOPIC CNT: SIGNIFICANT CHANGE UP
PLATELET # BLD AUTO: 134 K/UL — LOW (ref 150–400)
POTASSIUM SERPL-MCNC: 3.5 MMOL/L — SIGNIFICANT CHANGE UP (ref 3.5–5.3)
POTASSIUM SERPL-SCNC: 3.5 MMOL/L — SIGNIFICANT CHANGE UP (ref 3.5–5.3)
PROT SERPL-MCNC: 5.5 GM/DL — LOW (ref 6–8.3)
RBC # BLD: 3.42 M/UL — LOW (ref 4.2–5.8)
RBC # FLD: 13.3 % — SIGNIFICANT CHANGE UP (ref 10.3–14.5)
SODIUM SERPL-SCNC: 137 MMOL/L — SIGNIFICANT CHANGE UP (ref 135–145)
SPECIMEN SOURCE: SIGNIFICANT CHANGE UP
SPECIMEN SOURCE: SIGNIFICANT CHANGE UP
WBC # BLD: 6.51 K/UL — SIGNIFICANT CHANGE UP (ref 3.8–10.5)
WBC # FLD AUTO: 6.51 K/UL — SIGNIFICANT CHANGE UP (ref 3.8–10.5)

## 2021-08-16 PROCEDURE — 93306 TTE W/DOPPLER COMPLETE: CPT | Mod: 26

## 2021-08-16 PROCEDURE — 93971 EXTREMITY STUDY: CPT | Mod: 26,LT

## 2021-08-16 PROCEDURE — 99223 1ST HOSP IP/OBS HIGH 75: CPT

## 2021-08-16 PROCEDURE — 99232 SBSQ HOSP IP/OBS MODERATE 35: CPT

## 2021-08-16 RX ORDER — QUETIAPINE FUMARATE 200 MG/1
25 TABLET, FILM COATED ORAL ONCE
Refills: 0 | Status: COMPLETED | OUTPATIENT
Start: 2021-08-16 | End: 2021-08-16

## 2021-08-16 RX ORDER — HALOPERIDOL DECANOATE 100 MG/ML
0.5 INJECTION INTRAMUSCULAR ONCE
Refills: 0 | Status: COMPLETED | OUTPATIENT
Start: 2021-08-16 | End: 2021-08-16

## 2021-08-16 RX ORDER — TAMSULOSIN HYDROCHLORIDE 0.4 MG/1
0.4 CAPSULE ORAL AT BEDTIME
Refills: 0 | Status: DISCONTINUED | OUTPATIENT
Start: 2021-08-16 | End: 2021-08-25

## 2021-08-16 RX ORDER — SODIUM CHLORIDE 9 MG/ML
1000 INJECTION, SOLUTION INTRAVENOUS
Refills: 0 | Status: DISCONTINUED | OUTPATIENT
Start: 2021-08-16 | End: 2021-08-18

## 2021-08-16 RX ADMIN — HALOPERIDOL DECANOATE 0.5 MILLIGRAM(S): 100 INJECTION INTRAMUSCULAR at 21:33

## 2021-08-16 RX ADMIN — AMLODIPINE BESYLATE 5 MILLIGRAM(S): 2.5 TABLET ORAL at 09:26

## 2021-08-16 RX ADMIN — VALSARTAN 160 MILLIGRAM(S): 80 TABLET ORAL at 09:26

## 2021-08-16 RX ADMIN — CEFEPIME 1000 MILLIGRAM(S): 1 INJECTION, POWDER, FOR SOLUTION INTRAMUSCULAR; INTRAVENOUS at 09:27

## 2021-08-16 RX ADMIN — Medication 1 DROP(S): at 09:26

## 2021-08-16 RX ADMIN — SODIUM CHLORIDE 75 MILLILITER(S): 9 INJECTION, SOLUTION INTRAVENOUS at 09:28

## 2021-08-16 RX ADMIN — FINASTERIDE 5 MILLIGRAM(S): 5 TABLET, FILM COATED ORAL at 09:27

## 2021-08-16 RX ADMIN — ENOXAPARIN SODIUM 40 MILLIGRAM(S): 100 INJECTION SUBCUTANEOUS at 09:27

## 2021-08-16 RX ADMIN — QUETIAPINE FUMARATE 25 MILLIGRAM(S): 200 TABLET, FILM COATED ORAL at 20:34

## 2021-08-16 NOTE — CDI QUERY NOTE - NSCDIOTHERTXTBX2_GEN_ALL_CORE_FT
Altered Mental Status is a non-specific term. Can you further specify the condition to a more specific diagnosis? And is that the diagnosis that you have made to the underlying cause?     Could you please document  the etiology of the altered mental status such as:     a. Encephalopathy (a reversible condition)        -Type (anoxic, toxic –specify drug, hepatic, metabolic, hypertensive, traumatic, etc.)        -Acuity (acute, subacute, chronic)    b. Acute confusion/delirium  (specify if drug/alcohol induced)    c. Dementia or Alzheimer’s Dementia    d. Stupor/semi-coma      SUPPORTING DOCUMENTATION AND/OR CLINICAL EVIDENCE:    H&P :ROS: unable to obtain secondary to patient medical condition Constitutional: [age]il [id number] y.o. male with PMHx of mild OLIVER, HTN, HLD, diet controlled DMII, BPH, Glaucoma p/w weakness since this am - progressed through the day to the point was hardly able to move - brought in by daughter and found to have fever 100.4 in ED with no definite source. Impression:  Aspiration PNA, RAZA, Fever, Positive BC    ED note :Principal Discharge Dx Fever. Secondary Discharge Dx Altered mental status    SUPPORTING DOCUMENTATION AND/OR CLINICAL EVIDENCE:    LIVER FUNCTIONS - ( 16 Aug 2021 07:44 )  Alb: 2.2 g/dL / Pro: 5.5 gm/dL / ALK PHOS: 49 U/L / ALT: 106 U/L / AST: 92 U/L / GGT: x           Vital Signs:  T(F): 98.1 (16 Aug 2021 08:18), Max: 100.8 (14 Aug 2021 21:39)  HR: 56 (16 Aug 2021 08:18) (53 - 80)  BP: 133/68 (16 Aug 2021 08:18) (106/44 - 144/54)  RR: 18 (16 Aug 2021 08:18) (16 - 18)  SpO2: 99% (16 Aug 2021 08:18) (92% - 99%)

## 2021-08-16 NOTE — PROGRESS NOTE ADULT - ASSESSMENT
89 y.o. male with PMHx of mild Dementia, HTN, HLD, diet controlled DMII, BPH, Glaucoma admitted on 8/13 for evaluation of progressive weakness on day of admission, then fevers; patient mildly coughing, history per medical record as patient is unable to provide history.    1. Patient admitted with pneumonia, most likely micro aspiration pneumonia, also noted with leukocytosis most likely reactive to infection  - follow up cultures --- found to have alpha hemolytic strep in blood--- Strep dysgalactiae, group C/G  - day #3 cefepime  - tolerating antibiotics without rashes or side effects   - serial cbc and monitor temperature   - reviewed prior medical records to evaluate for resistant or atypical pathogens   - oxygen and nebs as needed   - will need echocardiogram to rule out endocarditis  - will repeat blood cultures in am  2. other issues: per medicine

## 2021-08-16 NOTE — CONSULT NOTE ADULT - ASSESSMENT
A/P: 89 y.o. male with PMHx of mild OLIVER, HTN, HLD, diet controlled DMII, BPH, Glaucoma p/w weakness since this am - progressed through the day to the point was hardly able to move - brought in by daughter and found to have fever 100.4 in ED with no definite source.    1. Aspiration PNA. ID following. Cont abx. Aspiration precautions.    2. Sinus bradycardia/chronotropic incompetence. EP following.  Will need PPM once cleared by EP.   No AV lyndsey agents.   2Decho pending.    3. HTN. BP stable. Cont current regimen.    4. DVT proph.

## 2021-08-16 NOTE — PROGRESS NOTE ADULT - SUBJECTIVE AND OBJECTIVE BOX
Subjective:  awake  sitting up in chair, c/o left leg swelling and redness.     MEDICATIONS  (STANDING):  amLODIPine   Tablet 5 milliGRAM(s) Oral daily  atorvastatin 40 milliGRAM(s) Oral at bedtime  cefepime  Injectable. 1000 milliGRAM(s) IV Push daily  enoxaparin Injectable 40 milliGRAM(s) SubCutaneous daily  finasteride 5 milliGRAM(s) Oral daily  timolol 0.5% Solution 1 Drop(s) Both EYES two times a day  valsartan 160 milliGRAM(s) Oral daily    MEDICATIONS  (PRN):  acetaminophen    Suspension .. 650 milliGRAM(s) Oral every 6 hours PRN Temp greater or equal to 38C (100.4F), Moderate Pain (4 - 6)      REVIEW OF SYSTEMS:    CONSTITUTIONAL:  As per HPI.  HEENT:  Eyes:  No diplopia or blurred vision. ENT:  No earache, sore throat or runny nose.  CARDIOVASCULAR:  No pressure, squeezing, tightness, heaviness or aching about the chest, neck, axilla or epigastrium.  RESPIRATORY:  No cough, shortness of breath, PND or orthopnea.  GASTROINTESTINAL:  No nausea, vomiting or diarrhea.  GENITOURINARY:  No dysuria, frequency or urgency.  MUSCULOSKELETAL:  no joint pain, deformity, tenderness  EXTREMITIES: + left lower leg swelling.   SKIN:  No change in skin  NEUROLOGIC:  No paresthesias, fasciculations, seizures or weakness.  PSYCHIATRIC:  No disorder of thought or mood.  ENDOCRINE:  No heat or cold intolerance, polyuria or polydipsia.  HEMATOLOGICAL:  No easy bruising or bleedings:    Vital Signs Last 24 Hrs  T(C): 36.7 (16 Aug 2021 08:18), Max: 37.3 (15 Aug 2021 22:12)  T(F): 98.1 (16 Aug 2021 08:18), Max: 99.1 (15 Aug 2021 22:12)  HR: 56 (16 Aug 2021 08:18) (56 - 61)  BP: 133/68 (16 Aug 2021 08:18) (133/68 - 144/54)  BP(mean): --  RR: 18 (16 Aug 2021 08:18) (18 - 18)  SpO2: 99% (16 Aug 2021 08:18) (94% - 99%)    PHYSICAL EXAMINATION:  HEAD: NC/AT  EYES: PERRLA, EOMI  NOSE: no abnormalities  NECK:  Supple. No lymphadenopathy. Jugular venous pressure not elevated. Carotids equal.   HEART:  S1S2 reg, no rubs murmurs or gallops   CHEST:  Chest is clear to auscultation. Normal respiratory effort.  ABDOMEN:  Soft and nontender.   : mckeon cath in place.   EXTREMITIES: 5/5 strength to upper and lower extremities. left lower leg w/ ankle and pedal edema localized circumferential petechial rash to ankle and left calf.   NEURO: awake, alert, pleasant AXO x 2      LABS:                        11.4   6.51  )-----------( 134      ( 16 Aug 2021 07:44 )             33.7     08-16    137  |  108  |  33<H>  ----------------------------<  94  3.5   |  25  |  1.16    Ca    9.4      16 Aug 2021 07:44    TPro  5.5<L>  /  Alb  2.2<L>  /  TBili  0.4  /  DBili  x   /  AST  92<H>  /  ALT  106<H>  /  AlkPhos  49  08-16    LIVER FUNCTIONS - ( 16 Aug 2021 07:44 )  Alb: 2.2 g/dL / Pro: 5.5 gm/dL / ALK PHOS: 49 U/L / ALT: 106 U/L / AST: 92 U/L / GGT: x             RADIOLOGY & ADDITIONAL TESTS:    < from: CT Chest No Cont (08.13.21 @ 23:29) >  IMPRESSION:    Mild compressive atelectasis at the left base secondary to large hiatal hernia. The lungs are otherwise clear.    --- End of Report ---    ANABELLE GLORIA MD; Attending Radiologist  This document has been electronically signed. Aug 14 2021 10:32AM    < end of copied text >

## 2021-08-16 NOTE — CDI QUERY NOTE - NSCDIOTHERTXTBX_GEN_ALL_CORE_HH
Clinical documentation indicates that this patient has SIRS elements present on admission.  There is a need to further clarify the status of sepsis.  Please indicate status of the patient's sepsis diagnosis in your progress notes and discharge summary as : Sepsis treatment continues, or Sepsis resolving, or Sepsis resolved, or Sepsis ruled-out.     PRESENT ON ADMISSION: Was sepsis present on admission?  If so, please document.    a. Sepsis ruled in  b. Sepsis is resolving  c. Sepsis ruled out  d. Early Sepsis POA, resolved  e. SIRS with organ failure (such as RAZA, Encephalopathy, Respiratory Failure)  f.  SIRS without organ failure (such as RAZA, Encephalopathy, Respiratory Failure)  e. Unable to determine      Please clarify known or suspected organism and/or associated organ failure, if known and applicable.    SUPPORTING DOCUMENTATION AND/OR CLINICAL EVIDENCE:    Patient admitted with Fever, AMS WBC 12    HPI Objective Statement: 89 y/o male presenting with weakness Fever and AMS,  pain now resolved in right groin and had difficulty to urinate requiring FQ placement. Urinary retention with right groin pain - neg UA, neg CT abd - Rodriguez placed, monitor UO, Hx of BPH on proscar  Recent bradycardia with plan to f/u with Dr. Montesinos on Monday - EKG was done in ED - unable to locate it, reordered - potentially related to aricept - was stopped  Large hiatal hernia on CXR and CT with stomach in chest LLL infiltrate/atelectasis worsening since this morning. Pt denies dysuria and coughing. Daughter describes it as listless. His words were slow to flow.+ Low abdominal pain earlier today.   Will cover for pna, although cxr unclear.  Given AMS and febrile illness will admit for further w/u and management.    ID  Patient admitted with pneumonia, most likely micro aspiration pneumonia, also noted with leukocytosis most likely reactive to infection  - follow up cultures --- found to have alpha hemolytic strep in blood- day #2 cefepime- tolerating antibiotics without rashes or side effects - serial cbc and monitor temperature       Vital Signs on Admission:T38 HR77, RR /56 2 97%RA     WBC: WBC Count: 6.51 K/uL [3.80 - 10.50] (08-16-21)  WBC Count: 11.24 K/uL *H* [3.80 - 10.50] (08-15-21)  WBC Count: 12.07 K/uL *H* [3.80 - 10.50] (08-14-21)  WBC Count: 12.39 K/uL *H* [3.80 - 10.50] (08-13-21)                                    Lactate: Lactate, Blood: 1.6 mmol/L [0.7 - 2.0] (08-13-21)           Procal 5  Blood Cultures: positive    Antibiotics:azithromycin  IVPB 255 mL/Hr IV Intermittent (08-13-21)cefepime  Injectable. 1000 milliGRAM(s) IV Push (08-15-21) 1000 milliGRAM(s) IV Push (08-16-21)  cefTRIAXone Injectable. 1000 milliGRAM(s) IV Push (08-13-21)

## 2021-08-16 NOTE — CONSULT NOTE ADULT - SUBJECTIVE AND OBJECTIVE BOX
Cardiology Consultation    HPI: 89 y.o. male with PMHx of mild OLIVER, HTN, HLD, diet controlled DMII, BPH, Glaucoma p/w weakness since this am - progressed through the day to the point was hardly able to move - brought in by daughter and found to have fever 100.4 in ED with no definite source. Pt reported some pain now resolved in right groin and had difficulty to urinate requiring FQ placement.    8/16. Pt known from my office practice.   Pt wore recent holter monitor demonstrating significant bradycardia and chronotropic incompetence.  Feels tired. Case d/w daughter at bedside.   No CP/SOB.     PAST MEDICAL & SURGICAL HISTORY:  HTN (hypertension)    HLD (hyperlipidemia)    Diabetes    History of BPH    OLIVER (dementia of Alzheimer type)    Bradycardia    Glaucoma    S/P hernia repair        Allergies    penicillins (Other)  tetracyclines (Other)    Intolerances        SOCIAL HISTORY: Denies tobacco, etoh abuse or illicit drug use    FAMILY HISTORY:  Known health problems: none        MEDICATIONS  (STANDING):  amLODIPine   Tablet 5 milliGRAM(s) Oral daily  atorvastatin 40 milliGRAM(s) Oral at bedtime  cefepime  Injectable. 1000 milliGRAM(s) IV Push daily  dextrose 5% + sodium chloride 0.45%. 1000 milliLiter(s) (75 mL/Hr) IV Continuous <Continuous>  enoxaparin Injectable 40 milliGRAM(s) SubCutaneous daily  finasteride 5 milliGRAM(s) Oral daily  tamsulosin 0.4 milliGRAM(s) Oral at bedtime  timolol 0.5% Solution 1 Drop(s) Both EYES two times a day  valsartan 160 milliGRAM(s) Oral daily    MEDICATIONS  (PRN):  acetaminophen    Suspension .. 650 milliGRAM(s) Oral every 6 hours PRN Temp greater or equal to 38C (100.4F), Moderate Pain (4 - 6)  bisacodyl 5 milliGRAM(s) Oral every 12 hours PRN Constipation      Vital Signs Last 24 Hrs  T(C): 36.7 (16 Aug 2021 08:18), Max: 37.3 (15 Aug 2021 22:12)  T(F): 98.1 (16 Aug 2021 08:18), Max: 99.1 (15 Aug 2021 22:12)  HR: 56 (16 Aug 2021 08:18) (56 - 61)  BP: 133/68 (16 Aug 2021 08:18) (133/68 - 144/54)  BP(mean): --  RR: 18 (16 Aug 2021 08:18) (18 - 18)  SpO2: 99% (16 Aug 2021 08:18) (94% - 99%)    REVIEW OF SYSTEMS:    CONSTITUTIONAL:  As per HPI.  HEENT:  Eyes:  No diplopia or blurred vision. ENT:  No earache, sore throat or runny nose.  CARDIOVASCULAR:  No pressure, squeezing, strangling, tightness, heaviness or aching about the chest, neck, axilla or epigastrium.  RESPIRATORY:  No cough, shortness of breath, PND or orthopnea.  GASTROINTESTINAL:  No nausea, vomiting or diarrhea.  GENITOURINARY:  No dysuria, frequency or urgency.  MUSCULOSKELETAL:  As per HPI.  NEUROLOGIC:  No paresthesias, fasciculations, seizures or weakness.    PHYSICAL EXAMINATION:    GENERAL APPEARANCE:  Pt. is not currently dyspneic, in no distress. Pt. is alert, oriented, and pleasant.  HEENT:  Pupils are normal and react normally. No icterus. Mucous membranes well colored.  NECK:  Supple. No lymphadenopathy. Jugular venous pressure not elevated. Carotids equal.   HEART:   The cardiac impulse has a normal quality. There are no murmurs, rubs or gallops noted  CHEST:  Chest is clear to auscultation. Normal respiratory effort.  ABDOMEN:  Soft and nontender.   EXTREMITIES:  There is no edema.     I&O's Summary    15 Aug 2021 07:01  -  16 Aug 2021 07:00  --------------------------------------------------------  IN: 0 mL / OUT: 2300 mL / NET: -2300 mL        LABS:                        11.4   6.51  )-----------( 134      ( 16 Aug 2021 07:44 )             33.7     08-16    137  |  108  |  33<H>  ----------------------------<  94  3.5   |  25  |  1.16    Ca    9.4      16 Aug 2021 07:44    TPro  5.5<L>  /  Alb  2.2<L>  /  TBili  0.4  /  DBili  x   /  AST  92<H>  /  ALT  106<H>  /  AlkPhos  49  08-16    LIVER FUNCTIONS - ( 16 Aug 2021 07:44 )  Alb: 2.2 g/dL / Pro: 5.5 gm/dL / ALK PHOS: 49 U/L / ALT: 106 U/L / AST: 92 U/L / GGT: x           EKG: < from: 12 Lead ECG (08.14.21 @ 08:14) >  Sinus bradycardia  Otherwise normal ECG  When compared with ECG of 13-AUG-2021 16:54,  No significant change was found    TELEMETRY: Not on tele.     CARDIAC TESTS: None    RADIOLOGY & ADDITIONAL STUDIES: LE doppler- negative for DVT.    ASSESSMENT & PLAN:

## 2021-08-16 NOTE — CONSULT NOTE ADULT - SUBJECTIVE AND OBJECTIVE BOX
Patient is a 89y old  Male who presents with a chief complaint of weakness (16 Aug 2021 12:22)      HPI:  89 y.o. male with PMHx of mild OLIVER, HTN, HLD, diet controlled DMII, BPH, Glaucoma on timolol p/w weakness since this am - progressed through the day to the point was hardly able to move - brought in by daughter and found to have fever 100.4 in ED with no definite source. Pt reported some pain now resolved in right groin and had difficulty to urinate requiring FQ placement.  (13 Aug 2021 22:38)    8/14/21 EKG: sinus maximus 50 bpm, IVCD    TELEMETRY:    CARDIAC TESTS:    PAST MEDICAL & SURGICAL HISTORY:  HTN (hypertension)    HLD (hyperlipidemia)    Diabetes    History of BPH    OLIVER (dementia of Alzheimer type)    Bradycardia    Glaucoma    S/P hernia repair        MEDICATIONS  (STANDING):  amLODIPine   Tablet 5 milliGRAM(s) Oral daily  atorvastatin 40 milliGRAM(s) Oral at bedtime  cefepime  Injectable. 1000 milliGRAM(s) IV Push daily  dextrose 5% + sodium chloride 0.45%. 1000 milliLiter(s) (75 mL/Hr) IV Continuous <Continuous>  enoxaparin Injectable 40 milliGRAM(s) SubCutaneous daily  finasteride 5 milliGRAM(s) Oral daily  timolol 0.5% Solution 1 Drop(s) Both EYES two times a day  valsartan 160 milliGRAM(s) Oral daily    MEDICATIONS  (PRN):  acetaminophen    Suspension .. 650 milliGRAM(s) Oral every 6 hours PRN Temp greater or equal to 38C (100.4F), Moderate Pain (4 - 6)  bisacodyl 5 milliGRAM(s) Oral every 12 hours PRN Constipation      FAMILY HISTORY:  Known health problems: none      SOCIAL HISTORY: Denies tobacco, etoh abuse or illicit drug use      REVIEW OF SYSTEMS:    CONSTITUTIONAL:  As per HPI.  HEENT:  Eyes:  No diplopia or blurred vision. ENT:  No earache, sore throat or runny nose.  CARDIOVASCULAR:  No Dsypnea/Palpitations/Dizziness/Syncope, No pressure, squeezing, strangling, tightness, heaviness or aching about the chest, neck, axilla or epigastrium.  RESPIRATORY:  No cough, shortness of breath, PND or orthopnea.  GASTROINTESTINAL:  No nausea, vomiting or diarrhea.  GENITOURINARY:  No dysuria, frequency or urgency.  MUSCULOSKELETAL:  As per HPI.  SKIN:  No change in skin, hair or nails.  NEUROLOGIC:  No paresthesias, fasciculations, seizures or weakness.  PSYCHIATRIC:  No disorder of thought or mood.  ENDOCRINE:  No heat or cold intolerance, polyuria or polydipsia.  HEMATOLOGICAL:  No easy bruising or bleedings:    T(C): 36.7 (08-16-21 @ 08:18), Max: 37.3 (08-15-21 @ 22:12)  HR: 56 (08-16-21 @ 08:18) (56 - 61)  BP: 133/68 (08-16-21 @ 08:18) (133/68 - 144/54)  RR: 18 (08-16-21 @ 08:18) (18 - 18)  SpO2: 99% (08-16-21 @ 08:18) (94% - 99%)    PHYSICAL EXAMINATION:    GENERAL APPEARANCE:  Pt. is not currently dyspneic, in no distress. Pt. is alert, oriented, and pleasant.  HEENT:  Pupils are normal and react normally. No icterus. Mucous membranes well colored.  NECK:  Supple. No lymphadenopathy. Jugular venous pressure not elevated. Carotids equal.   HEART:   regular rate and rhythm/ Afib. There are no murmurs, rubs or gallops noted  CHEST:  Chest is clear to auscultation. Normal respiratory effort.  ABDOMEN:  Soft and nontender.   EXTREMITIES:  There is no edema, warm and dry.      I&O's Summary    15 Aug 2021 07:01  -  16 Aug 2021 07:00  --------------------------------------------------------  IN: 0 mL / OUT: 2300 mL / NET: -2300 mL        LABS:                        11.4   6.51  )-----------( 134      ( 16 Aug 2021 07:44 )             33.7     08-16    137  |  108  |  33<H>  ----------------------------<  94  3.5   |  25  |  1.16    Ca    9.4      16 Aug 2021 07:44    TPro  5.5<L>  /  Alb  2.2<L>  /  TBili  0.4  /  DBili  x   /  AST  92<H>  /  ALT  106<H>  /  AlkPhos  49  08-16    LIVER FUNCTIONS - ( 16 Aug 2021 07:44 )  Alb: 2.2 g/dL / Pro: 5.5 gm/dL / ALK PHOS: 49 U/L / ALT: 106 U/L / AST: 92 U/L / GGT: x                      Patient is a 89y old  Male who presents with a chief complaint of weakness (16 Aug 2021 12:22)      HPI:  89 y.o. male with PMHx of mild OLIVER, HTN, HLD, diet controlled DMII, BPH, Glaucoma on timolol p/w weakness since this am - progressed through the day to the point was hardly able to move - brought in by daughter and found to have fever 100.4 in ED with no definite source. Pt reported some pain now resolved in right groin and had difficulty to urinate requiring FQ placement.  (13 Aug 2021 22:38)  4/16/ Pt seen and examined daughter at bedside, pt able to do some basic ADLs and needs help with other functions, able to dress himself and eat. Has day time fatigue.    8/14/21 EKG: sinus maximus 50 bpm, IVCD  Holter monitor from 8/11/21 Dr Rodney office shows sinus bradycardia 73%, during day time sinus  bradycardia to 38 bpm       CARDIAC TESTS:    PAST MEDICAL & SURGICAL HISTORY:  HTN (hypertension)    HLD (hyperlipidemia)    Diabetes    History of BPH    OLIVER (dementia of Alzheimer type)    Bradycardia    Glaucoma    S/P hernia repair        MEDICATIONS  (STANDING):  amLODIPine   Tablet 5 milliGRAM(s) Oral daily  atorvastatin 40 milliGRAM(s) Oral at bedtime  cefepime  Injectable. 1000 milliGRAM(s) IV Push daily  dextrose 5% + sodium chloride 0.45%. 1000 milliLiter(s) (75 mL/Hr) IV Continuous <Continuous>  enoxaparin Injectable 40 milliGRAM(s) SubCutaneous daily  finasteride 5 milliGRAM(s) Oral daily  timolol 0.5% Solution 1 Drop(s) Both EYES two times a day  valsartan 160 milliGRAM(s) Oral daily    MEDICATIONS  (PRN):  acetaminophen    Suspension .. 650 milliGRAM(s) Oral every 6 hours PRN Temp greater or equal to 38C (100.4F), Moderate Pain (4 - 6)  bisacodyl 5 milliGRAM(s) Oral every 12 hours PRN Constipation      FAMILY HISTORY:  Known health problems: none      SOCIAL HISTORY: Denies tobacco, etoh abuse or illicit drug use      REVIEW OF SYSTEMS:    CONSTITUTIONAL:  As per HPI.  HEENT:  Eyes:  No diplopia or blurred vision. ENT:  No earache, sore throat or runny nose.  CARDIOVASCULAR:  No Dsypnea/Palpitations/Dizziness/Syncope, No pressure, squeezing, strangling, tightness, heaviness or aching about the chest, neck, axilla or epigastrium.  RESPIRATORY:  No cough, shortness of breath, PND or orthopnea.  GASTROINTESTINAL:  No nausea, vomiting or diarrhea.  GENITOURINARY:  No dysuria, frequency or urgency.  MUSCULOSKELETAL:  As per HPI.  SKIN:  No change in skin, hair or nails.  NEUROLOGIC:  No paresthesias, fasciculations, seizures or weakness.  PSYCHIATRIC:  No disorder of thought or mood.  ENDOCRINE:  No heat or cold intolerance, polyuria or polydipsia.  HEMATOLOGICAL:  No easy bruising or bleedings:    T(C): 36.7 (08-16-21 @ 08:18), Max: 37.3 (08-15-21 @ 22:12)  HR: 56 (08-16-21 @ 08:18) (56 - 61)  BP: 133/68 (08-16-21 @ 08:18) (133/68 - 144/54)  RR: 18 (08-16-21 @ 08:18) (18 - 18)  SpO2: 99% (08-16-21 @ 08:18) (94% - 99%)    PHYSICAL EXAMINATION:    GENERAL APPEARANCE:  Pt. is not currently dyspneic, in no distress. Pt. is alert, oriented, and pleasant.  HEENT:  Pupils are normal and react normally. No icterus. Mucous membranes well colored.  NECK:  Supple. No lymphadenopathy. Jugular venous pressure not elevated. Carotids equal.   HEART:   regular rate and rhythm/ Afib. There are no murmurs, rubs or gallops noted  CHEST:  Chest is clear to auscultation. Normal respiratory effort.  ABDOMEN:  Soft and nontender.   EXTREMITIES:  There is no edema, warm and dry.      I&O's Summary    15 Aug 2021 07:01  -  16 Aug 2021 07:00  --------------------------------------------------------  IN: 0 mL / OUT: 2300 mL / NET: -2300 mL        LABS:                        11.4   6.51  )-----------( 134      ( 16 Aug 2021 07:44 )             33.7     08-16    137  |  108  |  33<H>  ----------------------------<  94  3.5   |  25  |  1.16    Ca    9.4      16 Aug 2021 07:44    TPro  5.5<L>  /  Alb  2.2<L>  /  TBili  0.4  /  DBili  x   /  AST  92<H>  /  ALT  106<H>  /  AlkPhos  49  08-16    LIVER FUNCTIONS - ( 16 Aug 2021 07:44 )  Alb: 2.2 g/dL / Pro: 5.5 gm/dL / ALK PHOS: 49 U/L / ALT: 106 U/L / AST: 92 U/L / GGT: x

## 2021-08-16 NOTE — PROGRESS NOTE ADULT - ASSESSMENT
- ct scan show large left sided hiatal hernia w LLL infiltrate/atelectasis  - wbc now 6.5 down from 11.2.  afebrile today  - on cefepime   - daughter says patient has some cough w eating. -SLP consulted, no dysphagia, regular consistency diet.   - added bowel regimen   - obtain u/s of left lower leg r/o dvt vs venous status vs cellulitis opt for conservative tx for now, await US and monitor.    - was scheduled for outpatient EP eval; will consult Dr. Guevara   - dvt proph - ct scan show large left sided hiatal hernia w LLL infiltrate/atelectasis  - wbc now 6.5 down from 11.2.  afebrile today  - on cefepime   - daughter says patient has some cough w eating. -SLP consulted, no dysphagia, regular consistency diet.   - added bowel regimen   - obtain u/s of left lower leg r/o dvt vs venous status vs cellulitis opt for conservative tx for now, await US and monitor.    - was scheduled for outpatient EP eval; will consult Dr. Guevara and Cardio Dr. Rodney   - dvt proph 89 y.o. male with PMHx of mild dementia, HTN, HLD, diet controlled DMII, BPH, Glaucoma p/w weakness since this am - progressed through the day to the point was hardly able to move - brought in by daughter and found to have fever 100.4 in ED with no definite source.      - ct scan show large left sided hiatal hernia w LLL infiltrate/atelectasis, BC positive for Strep dysgalactiae  - sepsis not present on arrival   - wbc now 6.5 down from 11.2.  afebrile today  - on cefepime   - mentation now at baseline   - daughter says patient has some cough w eating. -SLP consulted, no dysphagia, regular consistency diet.   - added bowel regimen   - obtain u/s of left lower leg r/o dvt vs venous status vs cellulitis opt for conservative tx for now, await US and monitor.    - was scheduled for outpatient EP eval; will consult Dr. Guevara and Cardio Dr. Rodney   - dvt proph

## 2021-08-16 NOTE — PROVIDER CONTACT NOTE (OTHER) - SITUATION
notified Dr Veloz's office of admission please fax over d.c paperwork once pt is d.c to [phone]
notified Dr Rodney's office of consult
notified Dr Hays's service of consult

## 2021-08-16 NOTE — CONSULT NOTE ADULT - ASSESSMENT
89 y.o. male with PMHx of mild OLIVER, HTN, HLD, diet controlled DMII, BPH, Glaucoma on timolol admitted for weakness and low grade fever, possibly uti   -medical management as per primary team, r/o infection  -sinus bradycardia unclear if any symptoms?, would stop timolol and use alternative drugs if possible, no other av node blocking agents on board       Thank you for involving our service in participating in the care of this patient  89 y.o. male with PMHx of mild OLIVER, HTN, HLD, diet controlled DMII, BPH, Glaucoma on timolol admitted for weakness and low grade fever, bacteremia and aspiration PNA   -medical management as per primary team and ID cont' cefepime  -profound sinus bradycardia at times and chronotropic incompetence came febrile in ed and HR was 50s bpm, pt likely benefits from PPM implant, high risk for infection, device implant to be scheduled only after resolution of all his infectious process and ID service approval to proceed with PPM implant    -tte for LV function, send TSH  Thank you for involving our service in participating in the care of this patient

## 2021-08-16 NOTE — PROGRESS NOTE ADULT - SUBJECTIVE AND OBJECTIVE BOX
Date of service: 08-16-21 @ 13:50      Patient sitting in chair, eating; seen earlier, daughter is concerned about his left foot with mild edema      ROS unable to obtain secondary to patient medical condition     MEDICATIONS  (STANDING):  amLODIPine   Tablet 5 milliGRAM(s) Oral daily  atorvastatin 40 milliGRAM(s) Oral at bedtime  cefepime  Injectable. 1000 milliGRAM(s) IV Push daily  dextrose 5% + sodium chloride 0.45%. 1000 milliLiter(s) (75 mL/Hr) IV Continuous <Continuous>  enoxaparin Injectable 40 milliGRAM(s) SubCutaneous daily  finasteride 5 milliGRAM(s) Oral daily  timolol 0.5% Solution 1 Drop(s) Both EYES two times a day  valsartan 160 milliGRAM(s) Oral daily    MEDICATIONS  (PRN):  acetaminophen    Suspension .. 650 milliGRAM(s) Oral every 6 hours PRN Temp greater or equal to 38C (100.4F), Moderate Pain (4 - 6)  bisacodyl 5 milliGRAM(s) Oral every 12 hours PRN Constipation      Vital Signs Last 24 Hrs  T(C): 36.7 (16 Aug 2021 08:18), Max: 37.3 (15 Aug 2021 22:12)  T(F): 98.1 (16 Aug 2021 08:18), Max: 99.1 (15 Aug 2021 22:12)  HR: 56 (16 Aug 2021 08:18) (56 - 61)  BP: 133/68 (16 Aug 2021 08:18) (133/68 - 144/54)  BP(mean): --  RR: 18 (16 Aug 2021 08:18) (18 - 18)  SpO2: 99% (16 Aug 2021 08:18) (94% - 99%)        Physical Exam:          Constitutional: frail looking  HEENT: NC/AT, EOMI, PERRLA, conjunctivae clear; ears and nose atraumatic; pharynx clear  Neck: supple; thyroid not palpable  Back: no tenderness  Respiratory: respiratory effort normal; clear to auscultation, diminished breath sounds at bases  Cardiovascular: S1S2 regular, no murmurs  Abdomen: soft, not tender, not distended, positive BS; no liver or spleen organomegaly  Genitourinary: no suprapubic tenderness  Musculoskeletal: no muscle tenderness, no joint swelling or tenderness  Neurological/ Psychiatric:  moving all extremities  Skin: no rashes; no palpable lesions    Labs: all available labs reviewed                      Labs:                        11.4   6.51  )-----------( 134      ( 16 Aug 2021 07:44 )             33.7     08-16    137  |  108  |  33<H>  ----------------------------<  94  3.5   |  25  |  1.16    Ca    9.4      16 Aug 2021 07:44    TPro  5.5<L>  /  Alb  2.2<L>  /  TBili  0.4  /  DBili  x   /  AST  92<H>  /  ALT  106<H>  /  AlkPhos  49  08-16           Cultures:       Culture - Urine (collected 08-13-21 @ 18:23)  Source: Clean Catch None  Final Report (08-16-21 @ 07:53):    <10,000 CFU/mL Normal Urogenital Makenna    Culture - Blood (collected 08-13-21 @ 17:00)  Source: .Blood None  Gram Stain (08-14-21 @ 21:48):    Growth in anaerobic bottle: Gram Positive Cocci in Pairs and Chains  Preliminary Report (08-15-21 @ 22:12):    Growth in anaerobic bottle: Streptococcus dysgalactiae (Group C/G)    Susceptibility to follow.    ***Blood Panel PCR results on this specimen are available    approximately 3 hours after the Gram stain result.***    Gram stain, PCR, and/or culture resultsmay not always    correspond due to difference in methodologies.    ************************************************************    This PCR assay was performed by multiplex PCR. This    Assay tests for 66 bacterial and resistance gene targets.    Please referto the [facility] test directory    at [email or web address] for details.  Organism: Blood Culture PCR (08-14-21 @ 23:21)  Organism: Blood Culture PCR (08-14-21 @ 23:21)      -  Streptococcus sp. (Not Grp A, B or S pneumoniae): Detec      Method Type: PCR    Culture - Blood (collected 08-13-21 @ 17:00)  Source: .Blood None  Preliminary Report (08-14-21 @ 23:02):    No growth to date.                < from: CT Chest No Cont (08.13.21 @ 23:29) >  Mild compressive atelectasis at the left base secondary to large hiatal hernia. The lungs are otherwise clear.    < end of copied text >        Radiology: all available radiological tests reviewed    Advanced directives addressed: DNR

## 2021-08-17 LAB
ANION GAP SERPL CALC-SCNC: 5 MMOL/L — SIGNIFICANT CHANGE UP (ref 5–17)
BUN SERPL-MCNC: 33 MG/DL — HIGH (ref 7–23)
CALCIUM SERPL-MCNC: 9.4 MG/DL — SIGNIFICANT CHANGE UP (ref 8.5–10.1)
CHLORIDE SERPL-SCNC: 109 MMOL/L — HIGH (ref 96–108)
CO2 SERPL-SCNC: 26 MMOL/L — SIGNIFICANT CHANGE UP (ref 22–31)
CREAT SERPL-MCNC: 1.14 MG/DL — SIGNIFICANT CHANGE UP (ref 0.5–1.3)
GLUCOSE SERPL-MCNC: 114 MG/DL — HIGH (ref 70–99)
HCT VFR BLD CALC: 33 % — LOW (ref 39–50)
HGB BLD-MCNC: 11 G/DL — LOW (ref 13–17)
MCHC RBC-ENTMCNC: 33 PG — SIGNIFICANT CHANGE UP (ref 27–34)
MCHC RBC-ENTMCNC: 33.3 GM/DL — SIGNIFICANT CHANGE UP (ref 32–36)
MCV RBC AUTO: 99.1 FL — SIGNIFICANT CHANGE UP (ref 80–100)
PLATELET # BLD AUTO: 143 K/UL — LOW (ref 150–400)
POTASSIUM SERPL-MCNC: 3.6 MMOL/L — SIGNIFICANT CHANGE UP (ref 3.5–5.3)
POTASSIUM SERPL-SCNC: 3.6 MMOL/L — SIGNIFICANT CHANGE UP (ref 3.5–5.3)
RBC # BLD: 3.33 M/UL — LOW (ref 4.2–5.8)
RBC # FLD: 13.2 % — SIGNIFICANT CHANGE UP (ref 10.3–14.5)
SODIUM SERPL-SCNC: 140 MMOL/L — SIGNIFICANT CHANGE UP (ref 135–145)
TSH SERPL-MCNC: 1.6 UU/ML — SIGNIFICANT CHANGE UP (ref 0.34–4.82)
WBC # BLD: 7.76 K/UL — SIGNIFICANT CHANGE UP (ref 3.8–10.5)
WBC # FLD AUTO: 7.76 K/UL — SIGNIFICANT CHANGE UP (ref 3.8–10.5)

## 2021-08-17 PROCEDURE — 99233 SBSQ HOSP IP/OBS HIGH 50: CPT

## 2021-08-17 RX ORDER — ACETAMINOPHEN 500 MG
650 TABLET ORAL ONCE
Refills: 0 | Status: COMPLETED | OUTPATIENT
Start: 2021-08-17 | End: 2021-08-17

## 2021-08-17 RX ORDER — HALOPERIDOL DECANOATE 100 MG/ML
0.5 INJECTION INTRAMUSCULAR AT BEDTIME
Refills: 0 | Status: DISCONTINUED | OUTPATIENT
Start: 2021-08-17 | End: 2021-08-25

## 2021-08-17 RX ORDER — VANCOMYCIN HCL 1 G
1000 VIAL (EA) INTRAVENOUS ONCE
Refills: 0 | Status: COMPLETED | OUTPATIENT
Start: 2021-08-17 | End: 2021-08-17

## 2021-08-17 RX ORDER — POLYETHYLENE GLYCOL 3350 17 G/17G
17 POWDER, FOR SOLUTION ORAL
Refills: 0 | Status: DISCONTINUED | OUTPATIENT
Start: 2021-08-17 | End: 2021-08-25

## 2021-08-17 RX ORDER — QUETIAPINE FUMARATE 200 MG/1
25 TABLET, FILM COATED ORAL AT BEDTIME
Refills: 0 | Status: DISCONTINUED | OUTPATIENT
Start: 2021-08-17 | End: 2021-08-25

## 2021-08-17 RX ADMIN — QUETIAPINE FUMARATE 25 MILLIGRAM(S): 200 TABLET, FILM COATED ORAL at 21:58

## 2021-08-17 RX ADMIN — Medication 650 MILLIGRAM(S): at 05:13

## 2021-08-17 RX ADMIN — ATORVASTATIN CALCIUM 40 MILLIGRAM(S): 80 TABLET, FILM COATED ORAL at 21:59

## 2021-08-17 RX ADMIN — POLYETHYLENE GLYCOL 3350 17 GRAM(S): 17 POWDER, FOR SOLUTION ORAL at 21:58

## 2021-08-17 RX ADMIN — ENOXAPARIN SODIUM 40 MILLIGRAM(S): 100 INJECTION SUBCUTANEOUS at 10:43

## 2021-08-17 RX ADMIN — FINASTERIDE 5 MILLIGRAM(S): 5 TABLET, FILM COATED ORAL at 10:43

## 2021-08-17 RX ADMIN — AMLODIPINE BESYLATE 5 MILLIGRAM(S): 2.5 TABLET ORAL at 10:43

## 2021-08-17 RX ADMIN — Medication 5 MILLIGRAM(S): at 13:37

## 2021-08-17 RX ADMIN — POLYETHYLENE GLYCOL 3350 17 GRAM(S): 17 POWDER, FOR SOLUTION ORAL at 16:38

## 2021-08-17 RX ADMIN — VALSARTAN 160 MILLIGRAM(S): 80 TABLET ORAL at 10:43

## 2021-08-17 RX ADMIN — SODIUM CHLORIDE 75 MILLILITER(S): 9 INJECTION, SOLUTION INTRAVENOUS at 10:43

## 2021-08-17 RX ADMIN — Medication 1 DROP(S): at 10:43

## 2021-08-17 RX ADMIN — TAMSULOSIN HYDROCHLORIDE 0.4 MILLIGRAM(S): 0.4 CAPSULE ORAL at 21:58

## 2021-08-17 RX ADMIN — Medication 250 MILLIGRAM(S): at 17:15

## 2021-08-17 RX ADMIN — CEFEPIME 1000 MILLIGRAM(S): 1 INJECTION, POWDER, FOR SOLUTION INTRAMUSCULAR; INTRAVENOUS at 10:43

## 2021-08-17 NOTE — CHART NOTE - NSCHARTNOTEFT_GEN_A_CORE
89 year old male with history of HTN, dementia, HLD found to have profound bradycardia on outside holter monitor currently admitted with bactermia/ aspiration PNA, cellulitis.      8/17/2021: Patient with fever overnight.    Tele: SB 50-60    ICU Vital Signs Last 24 Hrs  T(C): 37.2 (17 Aug 2021 16:51), Max: 37.9 (17 Aug 2021 02:30)  T(F): 99 (17 Aug 2021 16:51), Max: 100.3 (17 Aug 2021 02:30)  HR: 58 (17 Aug 2021 16:51) (57 - 65)  BP: 127/54 (17 Aug 2021 16:51) (127/54 - 140/53)  BP(mean): 75 (17 Aug 2021 16:51) (75 - 80)  ABP: --  ABP(mean): --  RR: 17 (17 Aug 2021 16:51) (16 - 18)  SpO2: 97% (17 Aug 2021 16:51) (95% - 98%) 89 year old male with history of HTN, dementia, HLD found to have profound bradycardia on outside holter monitor currently admitted with bactermia/ aspiration PNA, cellulitis.      8/17/2021: Patient with fever overnight.    Tele: SB 50-60    ICU Vital Signs Last 24 Hrs  T(C): 37.2 (17 Aug 2021 16:51), Max: 37.9 (17 Aug 2021 02:30)  T(F): 99 (17 Aug 2021 16:51), Max: 100.3 (17 Aug 2021 02:30)  HR: 58 (17 Aug 2021 16:51) (57 - 65)  BP: 127/54 (17 Aug 2021 16:51) (127/54 - 140/53)  BP(mean): 75 (17 Aug 2021 16:51) (75 - 80)  ABP: --  ABP(mean): --  RR: 17 (17 Aug 2021 16:51) (16 - 18)  SpO2: 97% (17 Aug 2021 16:51) (95% - 98%)    Plan:  will plan for pacemaker implant when cleared by ID  Further management per medicine

## 2021-08-17 NOTE — PROGRESS NOTE ADULT - SUBJECTIVE AND OBJECTIVE BOX
89 y.o. male with PMHx of mild dementia, HTN, HLD, diet controlled DMII, BPH, Glaucoma p/w weakness since this am - progressed through the day to the point was hardly able to move - brought in by daughter and found to have fever 100.4. Workup suggested - + blood infection /  cellulitis /  ? aspiration pneumonia    Medical progress: Patient's daughter very upset, as patient is not doing well. She feels his dad's leg is getting worse - more red /  hot on the (L) side. Discussed with ID -  added vancomycin x 1. Overnight patient very confused.   Complaints: patient's daughter with multiple complaints.       REVIEW OF SYSTEMS:  poor historian    PHYSICAL EXAMINATION:  HEAD: NC/AT  EYES: PERRLA, EOMI  NOSE: no abnormalities  NECK:  Supple. No lymphadenopathy. Jugular venous pressure not elevated. Carotids equal.   HEART:  S1S2 reg, no rubs murmurs or gallops   CHEST:  Chest is clear to auscultation. Normal respiratory effort.  ABDOMEN:  Soft and nontender.   : mckeon cath in place.   EXTREMITIES: (L) leg swelling /  redness /  hot   NEURO: awake, alert, pleasant AXO x 2      LABS:    CBC Full  -  ( 17 Aug 2021 08:36 )  WBC Count : 7.76 K/uL  RBC Count : 3.33 M/uL  Hemoglobin : 11.0 g/dL  Hematocrit : 33.0 %  Platelet Count - Automated : 143 K/uL  Mean Cell Volume : 99.1 fl  Mean Cell Hemoglobin : 33.0 pg  Mean Cell Hemoglobin Concentration : 33.3 gm/dL  Auto Neutrophil # : x  Auto Lymphocyte # : x  Auto Monocyte # : x  Auto Eosinophil # : x  Auto Basophil # : x  Auto Neutrophil % : x  Auto Lymphocyte % : x  Auto Monocyte % : x  Auto Eosinophil % : x  Auto Basophil % : x        08-17    140  |  109<H>  |  33<H>  ----------------------------<  114<H>  3.6   |  26  |  1.14    Ca    9.4      17 Aug 2021 08:36    TPro  5.5<L>  /  Alb  2.2<L>  /  TBili  0.4  /  DBili  x   /  AST  92<H>  /  ALT  106<H>  /  AlkPhos  49  08-16                   # (L) leg cellulitis  # LLL infiltrate/atelectasis  # Bcx positive for Strep dysgalactiae  - sepsis not present on arrival   - wbc trending down  - still with low grade fevers  - IV Cefepime / IV vancomycin x 1   - cave extensivelly discussed care with patient's daughter. I gave her all the record to her. She appears to be frustrated     # Bradycardia  - patient is a candidate of for PPM but not at this time as patient has an active infection  - EP / Cardiology following    # Abnormal CT scan  - Indeterminate pancreatic lesion in uncinate process measuring 1.5 cm, potentially a side branch IPMN. Contrast enhanced abdominal MRI may be performed nonemergently for further characterization as clinically warranted.  - this can be followed up as an outpatient    # Constipation  - po laxative  - out of bed      I have spent extensive time with patient's family going over all the lab work and imaging studies. All the workup information has been given to patient's daughter.

## 2021-08-17 NOTE — PROGRESS NOTE ADULT - ASSESSMENT
89 y.o. male with PMHx of mild Dementia, HTN, HLD, diet controlled DMII, BPH, Glaucoma admitted on 8/13 for evaluation of progressive weakness on day of admission, then fevers; patient mildly coughing, history per medical record as patient is unable to provide history.    1. Pneumonia, most likely micro aspiration pneumonia. Sepsis with alpha hemolytic strep. CRF stage 3.  - leukocytosis most likely reactive to infection  - follow up cultures --- found to have alpha hemolytic strep in blood--- Strep dysgalactiae, group C/G  - day # 4 cefepime  - tolerating antibiotics without rashes or side effects   - oxygen and nebs as needed   -aspiration precautions  - repeat blood cultures collected  -old chart reviewed to assess prior cultures  -monitor temps  -f/u CBC  -supportive care  2. Other issues:   -care per medicine

## 2021-08-17 NOTE — PROGRESS NOTE ADULT - SUBJECTIVE AND OBJECTIVE BOX
Date of service: 08-17-21 @ 15:12    Sitting in bed in NAD  Has low grade fever  Has dry cough    ROS: no fever or chills; poorly verbal    MEDICATIONS  (STANDING):  amLODIPine   Tablet 5 milliGRAM(s) Oral daily  atorvastatin 40 milliGRAM(s) Oral at bedtime  cefepime  Injectable. 1000 milliGRAM(s) IV Push daily  dextrose 5% + sodium chloride 0.45%. 1000 milliLiter(s) (75 mL/Hr) IV Continuous <Continuous>  enoxaparin Injectable 40 milliGRAM(s) SubCutaneous daily  finasteride 5 milliGRAM(s) Oral daily  tamsulosin 0.4 milliGRAM(s) Oral at bedtime  timolol 0.5% Solution 1 Drop(s) Both EYES two times a day  valsartan 160 milliGRAM(s) Oral daily    Vital Signs Last 24 Hrs  T(C): 37.1 (17 Aug 2021 09:12), Max: 37.9 (17 Aug 2021 02:30)  T(F): 98.8 (17 Aug 2021 09:12), Max: 100.3 (17 Aug 2021 02:30)  HR: 57 (17 Aug 2021 09:12) (57 - 65)  BP: 140/53 (17 Aug 2021 09:12) (129/57 - 140/53)  BP(mean): 80 (17 Aug 2021 09:12) (80 - 80)  RR: 16 (17 Aug 2021 09:12) (16 - 18)  SpO2: 95% (17 Aug 2021 09:12) (95% - 98%)     Physical exam:    Constitutional: frail looking  HEENT: NC/AT, EOMI, PERRLA, conjunctivae clear; ears and nose atraumatic; pharynx clear  Neck: supple; thyroid not palpable  Back: no tenderness  Respiratory: respiratory effort normal; crackles at bases, diminished breath sounds at bases  Cardiovascular: S1S2 regular, no murmurs  Abdomen: soft, not tender, not distended, positive BS; no liver or spleen organomegaly  Genitourinary: no suprapubic tenderness  Musculoskeletal: no muscle tenderness, no joint swelling or tenderness  Neurological/ Psychiatric:  moving all extremities  Skin: no rashes; no palpable lesions    Labs: all available labs reviewed                        11.4   6.51  )-----------( 134      ( 16 Aug 2021 07:44 )             33.7     08-16    137  |  108  |  33<H>  ----------------------------<  94  3.5   |  25  |  1.16    Ca    9.4      16 Aug 2021 07:44    TPro  5.5<L>  /  Alb  2.2<L>  /  TBili  0.4  /  DBili  x   /  AST  92<H>  /  ALT  106<H>  /  AlkPhos  49  08-16      Culture - Urine (collected 13 Aug 2021 18:23)  Source: Clean Catch None  Final Report (16 Aug 2021 07:53):    <10,000 CFU/mL Normal Urogenital Makenna    Culture - Blood (collected 13 Aug 2021 17:00)  Source: .Blood None  Gram Stain (14 Aug 2021 21:48):    Growth in anaerobic bottle: Gram Positive Cocci in Pairs and Chains  Final Report (16 Aug 2021 16:01):    Growth in anaerobic bottle: Streptococcus dysgalactiae (Group C/G)  Organism: Blood Culture PCR  Streptococcus dysgalactiae  Streptococcus dysgalactiae (16 Aug 2021 16:01)  Organism: Streptococcus dysgalactiae (16 Aug 2021 16:01)      -  Ceftriaxone: S 0.032      -  Penicillin: S 0.016 Predicts results for ampicillin, amoxicillin, amoxicillin/clavulanate, ampicillin/sulbactam, 1st, 2nd and 3rd generation cephalosporins and carbapenems.      Method Type: ETEST  Organism: Streptococcus dysgalactiae (16 Aug 2021 16:01)      -  Clindamycin: R      -  Levofloxacin: S      -  Vancomycin: S      Method Type: KB  Organism: Blood Culture PCR (16 Aug 2021 16:01)      -  Streptococcus sp. (Not Grp A, B or S pneumoniae): Detec      Method Type: PCR    Culture - Blood (collected 13 Aug 2021 17:00)  Source: .Blood None  Preliminary Report (14 Aug 2021 23:02):    No growth to date.        < from: CT Chest No Cont (08.13.21 @ 23:29) >  Mild compressive atelectasis at the left base secondary to large hiatal hernia. The lungs are otherwise clear.    < end of copied text >        Radiology: all available radiological tests reviewed    Advanced directives addressed: DNR

## 2021-08-17 NOTE — PROGRESS NOTE ADULT - PROBLEM SELECTOR PROBLEM 2
R/O Pneumonia, aspiration
Cellulitis of left lower leg
R/O Aspiration syndrome
R/O Aspiration syndrome
Cellulitis of left lower leg

## 2021-08-18 LAB
ANION GAP SERPL CALC-SCNC: 4 MMOL/L — LOW (ref 5–17)
BUN SERPL-MCNC: 26 MG/DL — HIGH (ref 7–23)
CALCIUM SERPL-MCNC: 9.1 MG/DL — SIGNIFICANT CHANGE UP (ref 8.5–10.1)
CHLORIDE SERPL-SCNC: 108 MMOL/L — SIGNIFICANT CHANGE UP (ref 96–108)
CO2 SERPL-SCNC: 27 MMOL/L — SIGNIFICANT CHANGE UP (ref 22–31)
CREAT SERPL-MCNC: 1.13 MG/DL — SIGNIFICANT CHANGE UP (ref 0.5–1.3)
CULTURE RESULTS: SIGNIFICANT CHANGE UP
GLUCOSE SERPL-MCNC: 110 MG/DL — HIGH (ref 70–99)
HCT VFR BLD CALC: 30.4 % — LOW (ref 39–50)
HGB BLD-MCNC: 10.1 G/DL — LOW (ref 13–17)
MCHC RBC-ENTMCNC: 33.1 PG — SIGNIFICANT CHANGE UP (ref 27–34)
MCHC RBC-ENTMCNC: 33.2 GM/DL — SIGNIFICANT CHANGE UP (ref 32–36)
MCV RBC AUTO: 99.7 FL — SIGNIFICANT CHANGE UP (ref 80–100)
PLATELET # BLD AUTO: 144 K/UL — LOW (ref 150–400)
POTASSIUM SERPL-MCNC: 4.1 MMOL/L — SIGNIFICANT CHANGE UP (ref 3.5–5.3)
POTASSIUM SERPL-SCNC: 4.1 MMOL/L — SIGNIFICANT CHANGE UP (ref 3.5–5.3)
RBC # BLD: 3.05 M/UL — LOW (ref 4.2–5.8)
RBC # FLD: 13.4 % — SIGNIFICANT CHANGE UP (ref 10.3–14.5)
SODIUM SERPL-SCNC: 139 MMOL/L — SIGNIFICANT CHANGE UP (ref 135–145)
SPECIMEN SOURCE: SIGNIFICANT CHANGE UP
WBC # BLD: 7.45 K/UL — SIGNIFICANT CHANGE UP (ref 3.8–10.5)
WBC # FLD AUTO: 7.45 K/UL — SIGNIFICANT CHANGE UP (ref 3.8–10.5)

## 2021-08-18 PROCEDURE — 73600 X-RAY EXAM OF ANKLE: CPT | Mod: 26,LT

## 2021-08-18 PROCEDURE — 99233 SBSQ HOSP IP/OBS HIGH 50: CPT

## 2021-08-18 RX ORDER — LORATADINE 10 MG/1
10 TABLET ORAL ONCE
Refills: 0 | Status: COMPLETED | OUTPATIENT
Start: 2021-08-18 | End: 2021-08-18

## 2021-08-18 RX ADMIN — ENOXAPARIN SODIUM 40 MILLIGRAM(S): 100 INJECTION SUBCUTANEOUS at 12:08

## 2021-08-18 RX ADMIN — FINASTERIDE 5 MILLIGRAM(S): 5 TABLET, FILM COATED ORAL at 12:09

## 2021-08-18 RX ADMIN — VALSARTAN 160 MILLIGRAM(S): 80 TABLET ORAL at 12:09

## 2021-08-18 RX ADMIN — ATORVASTATIN CALCIUM 40 MILLIGRAM(S): 80 TABLET, FILM COATED ORAL at 22:50

## 2021-08-18 RX ADMIN — SODIUM CHLORIDE 75 MILLILITER(S): 9 INJECTION, SOLUTION INTRAVENOUS at 01:56

## 2021-08-18 RX ADMIN — POLYETHYLENE GLYCOL 3350 17 GRAM(S): 17 POWDER, FOR SOLUTION ORAL at 12:10

## 2021-08-18 RX ADMIN — CEFEPIME 1000 MILLIGRAM(S): 1 INJECTION, POWDER, FOR SOLUTION INTRAMUSCULAR; INTRAVENOUS at 12:09

## 2021-08-18 RX ADMIN — QUETIAPINE FUMARATE 25 MILLIGRAM(S): 200 TABLET, FILM COATED ORAL at 22:53

## 2021-08-18 RX ADMIN — Medication 650 MILLIGRAM(S): at 15:47

## 2021-08-18 RX ADMIN — LORATADINE 10 MILLIGRAM(S): 10 TABLET ORAL at 22:50

## 2021-08-18 RX ADMIN — Medication 10 MILLIGRAM(S): at 18:20

## 2021-08-18 RX ADMIN — TAMSULOSIN HYDROCHLORIDE 0.4 MILLIGRAM(S): 0.4 CAPSULE ORAL at 22:50

## 2021-08-18 RX ADMIN — Medication 650 MILLIGRAM(S): at 14:30

## 2021-08-18 RX ADMIN — Medication 1 DROP(S): at 12:08

## 2021-08-18 RX ADMIN — POLYETHYLENE GLYCOL 3350 17 GRAM(S): 17 POWDER, FOR SOLUTION ORAL at 22:50

## 2021-08-18 RX ADMIN — AMLODIPINE BESYLATE 5 MILLIGRAM(S): 2.5 TABLET ORAL at 12:07

## 2021-08-18 NOTE — PROGRESS NOTE ADULT - ASSESSMENT
89 y.o. male with PMHx of mild Dementia, HTN, HLD, diet controlled DMII, BPH, Glaucoma admitted on 8/13 for evaluation of progressive weakness on day of admission, then fevers; patient mildly coughing, history per medical record as patient is unable to provide history.    1. Pneumonia, most likely micro aspiration pneumonia. Sepsis with alpha hemolytic strep. CRF stage 3.  - leukocytosis most likely reactive to infection  - follow up cultures --- found to have alpha hemolytic strep in blood--- Strep dysgalactiae, group C/G  - day # 5 cefepime  - tolerating antibiotics without rashes or side effects   - oxygen and nebs as needed   -aspiration precautions  - repeat blood cultures collected  -continue abx coverage; plan to change to oral abx therapy soon  -monitor temps  -f/u CBC  -supportive care  2. Other issues:   -care per medicine

## 2021-08-18 NOTE — CDI QUERY NOTE - NSCDIOTHERTXTBX_GEN_ALL_CORE_HH
Clinical documentation indicates that this patient has SIRS elements present on admission.  There is a need to further clarify the status of sepsis.  Please indicate status of the patient's sepsis diagnosis in your progress notes and discharge summary as : Sepsis treatment continues, or Sepsis resolving, or Sepsis resolved, or Sepsis ruled-out.     PRESENT ON ADMISSION: Was sepsis present on admission?  If so, please document.    a. Sepsis ruled in  b. Sepsis is resolving  c. Sepsis ruled out  d. Early Sepsis POA, resolved  e. SIRS with organ failure (such as RAZA, Encephalopathy, Respiratory Failure)  f.  SIRS without organ failure (such as RAZA, Encephalopathy, Respiratory Failure)  e. Unable to determine      Please clarify known or suspected organism and/or associated organ failure, if known and applicable.    SUPPORTING DOCUMENTATION AND/OR CLINICAL EVIDENCE:    Patient admitted with Fever, AMS WBC 12    HPI Objective Statement: 89 y/o male presenting with weakness Fever and AMS,  pain now resolved in right groin and had difficulty to urinate requiring FQ placement. Urinary retention with right groin pain - neg UA, neg CT abd - Rodriguez placed, monitor UO, Hx of BPH on proscar  Recent bradycardia with plan to f/u with Dr. Montesinos on Monday - EKG was done in ED - unable to locate it, reordered - potentially related to aricept - was stopped  Large hiatal hernia on CXR and CT with stomach in chest LLL infiltrate/atelectasis worsening since this morning. Pt denies dysuria and coughing. Daughter describes it as listless. His words were slow to flow.+ Low abdominal pain earlier today.   Will cover for pna, although cxr unclear.  Given AMS and febrile illness will admit for further w/u and management.    ID  Patient admitted with pneumonia, most likely micro aspiration pneumonia, also noted with leukocytosis most likely reactive to infection  - follow up cultures --- found to have alpha hemolytic strep in blood- day #2 cefepime- tolerating antibiotics without rashes or side effects - serial cbc and monitor temperature       Vital Signs on Admission:T38 HR77, RR /56 2 97%RA     WBC: WBC Count: 6.51 K/uL [3.80 - 10.50] (08-16-21)  WBC Count: 11.24 K/uL *H* [3.80 - 10.50] (08-15-21)  WBC Count: 12.07 K/uL *H* [3.80 - 10.50] (08-14-21)  WBC Count: 12.39 K/uL *H* [3.80 - 10.50] (08-13-21)                                    Lactate: Lactate, Blood: 1.6 mmol/L [0.7 - 2.0] (08-13-21)           Procal 5  Blood Cultures: positive    Antibiotics:azithromycin  IVPB 255 mL/Hr IV Intermittent (08-13-21)cefepime  Injectable. 1000 milliGRAM(s) IV Push (08-15-21) 1000 milliGRAM(s) IV Push (08-16-21)  cefTRIAXone Injectable. 1000 milliGRAM(s) IV Push (08-13-21) Clinical documentation indicates that this patient has SIRS elements present on admission.  There is a need to further clarify the status of sepsis.  Please indicate status of the patient's sepsis diagnosis in your progress notes and discharge summary as : Sepsis treatment continues, or Sepsis resolving, or Sepsis resolved, or Sepsis ruled-out.     PRESENT ON ADMISSION: Was sepsis present on admission?  If so, please document.    a. Sepsis ruled in  b. Sepsis is resolving  c. Sepsis ruled out  d. Early Sepsis POA, resolved  e. SIRS with organ failure (such as RAZA, Encephalopathy, Respiratory Failure)  f.  SIRS without organ failure (such as RAZA, Encephalopathy, Respiratory Failure)  e. Unable to determine      Please clarify known or suspected organism and/or associated organ failure, if known and applicable.    SUPPORTING DOCUMENTATION AND/OR CLINICAL EVIDENCE:    Patient admitted with Fever, AMS (encephalopathy documented by ID) ,WBC 12 ,Positive Blood cultures, Procalcitonin 5    ID documents  on 8/17, 18, 19   Sepsis with alpha hemolytic strep. CRF stage 3. Allergy to PCN. Patient admitted with pneumonia, most likely micro aspiration pneumonia,     HPI Objective Statement: 89 y/o male presenting with weakness Fever and AMS,  pain now resolved in right groin and had difficulty to urinate requiring FQ placement. Urinary retention with right groin pain - neg UA, neg CT abd - Rodriguez placed, monitor UO, Hx of BPH on proscar  Recent bradycardia with plan to f/u with Dr. Montesinos on Monday - EKG was done in ED - unable to locate it, reordered - potentially related to aricept - was stopped  Large hiatal hernia on CXR and CT with stomach in chest LLL infiltrate/atelectasis worsening since this morning. Pt denies dysuria and coughing. Daughter describes it as listless. His words were slow to flow.+ Low abdominal pain earlier today.   Will cover for pna, although cxr unclear.  Given AMS and febrile illness will admit for further w/u and management.     Vital Signs on Admission:T38 HR77, RR /56 2 97%RA     WBC: WBC Count: 6.51 K/uL [3.80 - 10.50] (08-16-21)  WBC Count: 11.24 K/uL *H* [3.80 - 10.50] (08-15-21)  WBC Count: 12.07 K/uL *H* [3.80 - 10.50] (08-14-21)  WBC Count: 12.39 K/uL *H* [3.80 - 10.50] (08-13-21)                                    Lactate: Lactate, Blood: 1.6 mmol/L [0.7 - 2.0] (08-13-21)             Antibiotics:azithromycin  IVPB 255 mL/Hr IV Intermittent (08-13-21)cefepime  Injectable. 1000 milliGRAM(s) IV Push (08-15-21) 1000 milliGRAM(s) IV Push (08-16-21)  cefTRIAXone Injectable. 1000 milliGRAM(s) IV Push (08-13-21)

## 2021-08-18 NOTE — CDI QUERY NOTE - NSCDIOTHERTXTBX2_GEN_ALL_CORE_FT
Altered Mental Status is a non-specific term. Can you further specify the condition to a more specific diagnosis? And is that the diagnosis that you have made to the underlying cause?     Could you please document  the etiology of the altered mental status such as:     a. Encephalopathy (a reversible condition)        -Type (anoxic, toxic –specify drug, hepatic, metabolic, hypertensive, traumatic, etc.)        -Acuity (acute, subacute, chronic)    b. Acute confusion/delirium  (specify if drug/alcohol induced)    c. Dementia or Alzheimer’s Dementia    d. Stupor/semi-coma      SUPPORTING DOCUMENTATION AND/OR CLINICAL EVIDENCE:    H&P :ROS: unable to obtain secondary to patient medical condition Constitutional: frail [id number] y.o. male with PMHx of mild OLIVER, HTN, HLD, diet controlled DMII, BPH, Glaucoma p/w weakness since this am - progressed through the day to the point was hardly able to move - brought in by daughter and found to have fever 100.4 in ED with no definite source. Impression:  Aspiration PNA, RAZA, Fever, Positive BC    ED note :Principal Discharge Dx Fever. Secondary Discharge Dx Altered mental status    SUPPORTING DOCUMENTATION AND/OR CLINICAL EVIDENCE:    LIVER FUNCTIONS - ( 16 Aug 2021 07:44 )  Alb: 2.2 g/dL / Pro: 5.5 gm/dL / ALK PHOS: 49 U/L / ALT: 106 U/L / AST: 92 U/L / GGT: x           Vital Signs:  T(F): 98.1 (16 Aug 2021 08:18), Max: 100.8 (14 Aug 2021 21:39)  HR: 56 (16 Aug 2021 08:18) (53 - 80)  BP: 133/68 (16 Aug 2021 08:18) (106/44 - 144/54)  RR: 18 (16 Aug 2021 08:18) (16 - 18)  SpO2: 99% (16 Aug 2021 08:18) (92% - 99%) Altered Mental Status is a non-specific term. Can you further specify the condition to a more specific diagnosis? And is that the diagnosis that you have made to the underlying cause?     Could you please document  the etiology of the altered mental status such as:     a. Encephalopathy (a reversible condition)        -Type (anoxic, toxic –specify drug, hepatic, metabolic, hypertensive, traumatic, etc.)        -Acuity (acute, subacute, chronic)    b. Acute confusion/delirium  (specify if drug/alcohol induced)    c. Dementia or Alzheimer’s Dementia    d. Stupor/semi-coma      SUPPORTING DOCUMENTATION AND/OR CLINICAL EVIDENCE:    ID documents on 8/19  -encephalopathy    H&P :ROS: unable to obtain secondary to patient medical condition Constitutional: frail [id number] y.o. male with PMHx of mild OLIVER, HTN, HLD, diet controlled DMII, BPH, Glaucoma p/w weakness since this am - progressed through the day to the point was hardly able to move - brought in by daughter and found to have fever 100.4 in ED with no definite source. Impression:  Aspiration PNA, RAZA, Fever, Positive BC    ED note :Principal Discharge Dx Fever. Secondary Discharge Dx Altered mental status    SUPPORTING DOCUMENTATION AND/OR CLINICAL EVIDENCE:    LIVER FUNCTIONS - ( 16 Aug 2021 07:44 )  Alb: 2.2 g/dL / Pro: 5.5 gm/dL / ALK PHOS: 49 U/L / ALT: 106 U/L / AST: 92 U/L / GGT: x           Vital Signs:  T(F): 98.1 (16 Aug 2021 08:18), Max: 100.8 (14 Aug 2021 21:39)  HR: 56 (16 Aug 2021 08:18) (53 - 80)  BP: 133/68 (16 Aug 2021 08:18) (106/44 - 144/54)  RR: 18 (16 Aug 2021 08:18) (16 - 18)  SpO2: 99% (16 Aug 2021 08:18) (92% - 99%)

## 2021-08-18 NOTE — PROGRESS NOTE ADULT - SUBJECTIVE AND OBJECTIVE BOX
Date of service: 08-18-21 @ 09:45    Lying in bed in NAD  No SOB at rest  Has dry cough  Has low grade fever    ROS: denies dizziness, no HA, no abdominal pain, no diarrhea or constipation; no dysuria, no legs pain, no rashes    MEDICATIONS  (STANDING):  amLODIPine   Tablet 5 milliGRAM(s) Oral daily  atorvastatin 40 milliGRAM(s) Oral at bedtime  cefepime  Injectable. 1000 milliGRAM(s) IV Push daily  dextrose 5% + sodium chloride 0.45%. 1000 milliLiter(s) (75 mL/Hr) IV Continuous <Continuous>  enoxaparin Injectable 40 milliGRAM(s) SubCutaneous daily  finasteride 5 milliGRAM(s) Oral daily  polyethylene glycol 3350 17 Gram(s) Oral two times a day  tamsulosin 0.4 milliGRAM(s) Oral at bedtime  timolol 0.5% Solution 1 Drop(s) Both EYES two times a day  valsartan 160 milliGRAM(s) Oral daily    Vital Signs Last 24 Hrs  T(C): 36.9 (18 Aug 2021 08:42), Max: 37.2 (17 Aug 2021 16:51)  T(F): 98.4 (18 Aug 2021 08:42), Max: 99 (17 Aug 2021 16:51)  HR: 57 (18 Aug 2021 08:42) (57 - 74)  BP: 116/50 (18 Aug 2021 08:42) (116/50 - 129/61)  BP(mean): 75 (17 Aug 2021 16:51) (75 - 75)  RR: 18 (18 Aug 2021 08:42) (17 - 18)  SpO2: 96% (18 Aug 2021 08:42) (95% - 97%)     Physical exam:    Constitutional: frail looking  HEENT: NC/AT, EOMI, PERRLA, conjunctivae clear  Neck: supple; thyroid not palpable  Back: no tenderness  Respiratory: respiratory effort normal; crackles at bases, diminished breath sounds at bases  Cardiovascular: S1S2 regular, no murmurs  Abdomen: soft, not tender, not distended, positive BS  Genitourinary: no suprapubic tenderness  Musculoskeletal: no muscle tenderness, no joint swelling or tenderness  Neurological/ Psychiatric:  moving all extremities  Skin: no rashes; no palpable lesions    Labs: all available labs reviewed                        11.4   6.51  )-----------( 134      ( 16 Aug 2021 07:44 )             33.7     08-16    137  |  108  |  33<H>  ----------------------------<  94  3.5   |  25  |  1.16    Ca    9.4      16 Aug 2021 07:44    TPro  5.5<L>  /  Alb  2.2<L>  /  TBili  0.4  /  DBili  x   /  AST  92<H>  /  ALT  106<H>  /  AlkPhos  49  08-16      Culture - Urine (collected 13 Aug 2021 18:23)  Source: Clean Catch None  Final Report (16 Aug 2021 07:53):    <10,000 CFU/mL Normal Urogenital Makenna    Culture - Blood (collected 13 Aug 2021 17:00)  Source: .Blood None  Gram Stain (14 Aug 2021 21:48):    Growth in anaerobic bottle: Gram Positive Cocci in Pairs and Chains  Final Report (16 Aug 2021 16:01):    Growth in anaerobic bottle: Streptococcus dysgalactiae (Group C/G)  Organism: Blood Culture PCR  Streptococcus dysgalactiae  Streptococcus dysgalactiae (16 Aug 2021 16:01)  Organism: Streptococcus dysgalactiae (16 Aug 2021 16:01)      -  Ceftriaxone: S 0.032      -  Penicillin: S 0.016 Predicts results for ampicillin, amoxicillin, amoxicillin/clavulanate, ampicillin/sulbactam, 1st, 2nd and 3rd generation cephalosporins and carbapenems.      Method Type: ETEST  Organism: Streptococcus dysgalactiae (16 Aug 2021 16:01)      -  Clindamycin: R      -  Levofloxacin: S      -  Vancomycin: S      Method Type: KB  Organism: Blood Culture PCR (16 Aug 2021 16:01)      -  Streptococcus sp. (Not Grp A, B or S pneumoniae): Detec      Method Type: PCR    Culture - Blood (collected 13 Aug 2021 17:00)  Source: .Blood None  Preliminary Report (14 Aug 2021 23:02):    No growth to date.    < from: CT Chest No Cont (08.13.21 @ 23:29) >  Mild compressive atelectasis at the left base secondary to large hiatal hernia. The lungs are otherwise clear.  < end of copied text >    Radiology: all available radiological tests reviewed    Advanced directives addressed: DNR

## 2021-08-18 NOTE — PROGRESS NOTE ADULT - SUBJECTIVE AND OBJECTIVE BOX
CC: weakness (18 Aug 2021 09:44)    HPI:  89 y.o. male with PMHx of mild OLIVER, HTN, HLD, diet controlled DMII, BPH, Glaucoma p/w weakness since this am - progressed through the day to the point was hardly able to move - brought in by daughter and found to have fever 100.4 in ED with no definite source. Pt reported some pain now resolved in right groin and had difficulty to urinate requiring FQ placement.     (13 Aug 2021 22:38)    INTERVAL HPI/OVERNIGHT EVENTS:    Vital Signs Last 24 Hrs  T(C): 36.9 (18 Aug 2021 08:42), Max: 37.2 (17 Aug 2021 16:51)  T(F): 98.4 (18 Aug 2021 08:42), Max: 99 (17 Aug 2021 16:51)  HR: 57 (18 Aug 2021 08:42) (57 - 74)  BP: 116/50 (18 Aug 2021 08:42) (116/50 - 129/61)  BP(mean): 75 (17 Aug 2021 16:51) (75 - 75)  RR: 18 (18 Aug 2021 08:42) (17 - 18)  SpO2: 96% (18 Aug 2021 08:42) (95% - 97%)  I&O's Detail    17 Aug 2021 07:01  -  18 Aug 2021 07:00  --------------------------------------------------------  IN:  Total IN: 0 mL    OUT:    Indwelling Catheter - Urethral (mL): 2850 mL  Total OUT: 2850 mL    Total NET: -2850 mL        REVIEW OF SYSTEMS:    CONSTITUTIONAL: No weakness, fevers or chills  EYES/ENT: No visual changes;  No vertigo or throat pain   NECK: No pain or stiffness  RESPIRATORY: No cough, wheezing, hemoptysis; No shortness of breath  CARDIOVASCULAR: No chest pain or palpitations  GASTROINTESTINAL: No abdominal or epigastric pain. No nausea, vomiting, or hematemesis; No diarrhea or constipation. No melena or hematochezia.  GENITOURINARY: No dysuria, frequency or hematuria  NEUROLOGICAL: No numbness or weakness  SKIN: No itching, burning, rashes, or lesions   All other review of systems is negative unless indicated above.  PHYSICAL EXAM:    General: Well developed; well nourished; in no acute distress  Eyes: PERRLA, EOMI; conjunctiva and sclera clear  Head: Normocephalic; atraumatic  ENMT: No nasal discharge; airway clear  Neck: Supple; non tender; no masses  Respiratory: No wheezes, rales or rhonchi  Cardiovascular: Regular rate and rhythm. S1 and S2 Normal; No murmurs, gallops or rubs  Gastrointestinal: Soft non-tender non-distended; Normal bowel sounds  Genitourinary: No  suprapubic  tenderness  Extremities: Normal range of motion, No clubbing, cyanosis or edema  Vascular: Peripheral pulses palpable 2+ bilaterally  Neurological: Alert and oriented x4  Skin: Warm and dry. No acute rash  Lymph Nodes: No acute cervical adenopathy  Musculoskeletal: Normal muscle tone, without deformities  Psychiatric: Cooperative and appropriate                            10.1   7.45  )-----------( 144      ( 18 Aug 2021 06:27 )             30.4     18 Aug 2021 06:27    139    |  108    |  26     ----------------------------<  110    4.1     |  27     |  1.13     Ca    9.1        18 Aug 2021 06:27        CAPILLARY BLOOD GLUCOSE              MEDICATIONS  (STANDING):  amLODIPine   Tablet 5 milliGRAM(s) Oral daily  atorvastatin 40 milliGRAM(s) Oral at bedtime  cefepime  Injectable. 1000 milliGRAM(s) IV Push daily  dextrose 5% + sodium chloride 0.45%. 1000 milliLiter(s) (75 mL/Hr) IV Continuous <Continuous>  enoxaparin Injectable 40 milliGRAM(s) SubCutaneous daily  finasteride 5 milliGRAM(s) Oral daily  polyethylene glycol 3350 17 Gram(s) Oral two times a day  tamsulosin 0.4 milliGRAM(s) Oral at bedtime  timolol 0.5% Solution 1 Drop(s) Both EYES two times a day  valsartan 160 milliGRAM(s) Oral daily    MEDICATIONS  (PRN):  acetaminophen    Suspension .. 650 milliGRAM(s) Oral every 6 hours PRN Temp greater or equal to 38C (100.4F), Moderate Pain (4 - 6)  bisacodyl 5 milliGRAM(s) Oral every 12 hours PRN Constipation  haloperidol    Injectable 0.5 milliGRAM(s) IntraMuscular at bedtime PRN agitation  QUEtiapine 25 milliGRAM(s) Oral at bedtime PRN agitation      RADIOLOGY & ADDITIONAL TESTS: CC: weakness (18 Aug 2021 09:44)    HPI:  89 y.o. male with PMHx of mild OLIVER, HTN, HLD, diet controlled DMII, BPH, Glaucoma p/w progressive generalized weakness  to the point was hardly able to move - brought in by daughter and found to have fever 100.4 in ED with no definite source. Pt reported some pain now resolved in right groin and had difficulty to urinate requiring FQ placement.      INTERVAL HPI/ OVERNIGHT EVENTS: chart reviewed, Pt was seen and examined, c/o feeling constipated for number of days, doesn't remember exactly  last BM. No abd pain, otherwise reports that feels better, denies other complains, Agreed to get OOB to chair. As per  aid, Pt c/o foot pain when was getting up.     Vital Signs Last 24 Hrs  T(C): 36.9 (18 Aug 2021 08:42), Max: 37.2 (17 Aug 2021 16:51)  T(F): 98.4 (18 Aug 2021 08:42), Max: 99 (17 Aug 2021 16:51)  HR: 57 (18 Aug 2021 08:42) (57 - 74)  BP: 116/50 (18 Aug 2021 08:42) (116/50 - 129/61)  BP(mean): 75 (17 Aug 2021 16:51) (75 - 75)  RR: 18 (18 Aug 2021 08:42) (17 - 18)  SpO2: 96% (18 Aug 2021 08:42) (95% - 97%)            REVIEW OF SYSTEMS:  All other review of systems is negative unless indicated above.      PHYSICAL EXAM:  General: Well developed;  frail elderly male  in no acute distress. [place]   Eyes:  EOMI; conjunctiva and sclera clear  Head: Normocephalic; atraumatic  ENMT: No nasal discharge; airway clear  Neck: Supple; non tender; no masses  Respiratory: No wheezes, rales or rhonchi  Cardiovascular: Regular rate and rhythm. S1 and S2 Normal;   Gastrointestinal: Soft non-tender non-distended; Normal bowel sounds  Genitourinary: No  suprapubic  tenderness  Extremities: LLE  with edema and erythema starting ankle up to mid mid calf with no clear border, tender to palpation at ankle   Vascular: Peripheral pulses palpable 2+ bilaterally  Neurological: Alert and oriented x2-3, non focal, forgetful   Musculoskeletal: Normal muscle tone and strength   Psychiatric: Cooperative    LABS:                         10.1   7.45  )-----------( 144      ( 18 Aug 2021 06:27 )             30.4     18 Aug 2021 06:27    139    |  108    |  26     ----------------------------<  110    4.1     |  27     |  1.13     Ca    9.1        18 Aug 2021 06:27        MEDICATIONS  (STANDING):  amLODIPine   Tablet 5 milliGRAM(s) Oral daily  atorvastatin 40 milliGRAM(s) Oral at bedtime  cefepime  Injectable. 1000 milliGRAM(s) IV Push daily  dextrose 5% + sodium chloride 0.45%. 1000 milliLiter(s) (75 mL/Hr) IV Continuous <Continuous>  enoxaparin Injectable 40 milliGRAM(s) SubCutaneous daily  finasteride 5 milliGRAM(s) Oral daily  polyethylene glycol 3350 17 Gram(s) Oral two times a day  tamsulosin 0.4 milliGRAM(s) Oral at bedtime  timolol 0.5% Solution 1 Drop(s) Both EYES two times a day  valsartan 160 milliGRAM(s) Oral daily    MEDICATIONS  (PRN):  acetaminophen    Suspension .. 650 milliGRAM(s) Oral every 6 hours PRN Temp greater or equal to 38C (100.4F), Moderate Pain (4 - 6)  bisacodyl 5 milliGRAM(s) Oral every 12 hours PRN Constipation  haloperidol    Injectable 0.5 milliGRAM(s) IntraMuscular at bedtime PRN agitation  QUEtiapine 25 milliGRAM(s) Oral at bedtime PRN agitation      RADIOLOGY & ADDITIONAL TESTS:  < from: US Duplex Venous Lower Ext Ltd, Left (08.16.21 @ 13:50) >  EXAM:  US DPLX LWR EXT VEINS LTD LT                            PROCEDURE DATE:  08/16/2021          INTERPRETATION:  CLINICAL INFORMATION: Left leg swelling and redness.    COMPARISON: None available.    TECHNIQUE: Duplex sonography of the LEFT LOWER extremity veins with color and spectral Doppler, with and without compression.    FINDINGS:    There is normal compressibility of the left common femoral, femoral and popliteal veins.  The contralateral common femoral vein is patent.  Doppler examination shows normal spontaneous and phasic flow.    No calf vein thrombosis is detected.    Subcutaneous edema in the left calf.    Note is made of a prominent left groin lymph node measuring 3.8 x 1.2 x 2.3 cm which maintains a normal morphology.    IMPRESSION:  No evidence of left lower extremity deep venous thrombosis.    < from: CT Chest No Cont (08.13.21 @ 23:29) >  EXAM:  CT CHEST                            PROCEDURE DATE:  08/13/2021          INTERPRETATION:  .  ACC: [id number]  INDICATION: Fever, abnormal chest radiograph  TECHNIQUE: Unenhanced CT of the chest. Coronal and sagittal images were reconstructed. Maximum intensity projection images were generated.  COMPARISON: No prior chest CT.    FINDINGS:    AIRWAYS, LUNGS and PLEURA: Patent central airways. Mild compressive atelectasis at the left base secondary to large hiatal hernia. Small calcified nodule within the left upper lobe. The lungs are otherwise clear. No pleural effusion.    MEDIASTINUM AND SADIE: No lymphadenopathy.    HEART AND VESSELS: Heart size is normal. No pericardial effusion. Thoracic aorta and pulmonary artery normal in diameter. Coronary and aortic calcifications.    VISUALIZED UPPER ABDOMEN: Large hiatal hernia containing intrathoracic stomach.    CHEST WALL AND BONES: No aggressive osseous lesion.    LOWER NECK: Within normal limits.    IMPRESSION:    Mild compressive atelectasis at the left base secondary to large hiatal hernia. The lungs are otherwise clear.

## 2021-08-18 NOTE — PROGRESS NOTE ADULT - ASSESSMENT
# (L) leg cellulitis  # LLL infiltrate/atelectasis  # Bcx positive for Strep dysgalactiae  - sepsis not present on arrival   - wbc trending down  - still with low grade fevers  - IV Cefepime / IV vancomycin x 1   - cave extensivelly discussed care with patient's daughter. I gave her all the record to her. She appears to be frustrated     # Bradycardia  - patient is a candidate of for PPM but not at this time as patient has an active infection  - EP / Cardiology following    # Abnormal CT scan  - Indeterminate pancreatic lesion in uncinate process measuring 1.5 cm, potentially a side branch IPMN. Contrast enhanced abdominal MRI may be performed nonemergently for further characterization as clinically warranted.  - this can be followed up as an outpatient    # Constipation  - po laxative  - out of bed   89 y.o. male with PMHx of mild OLIVER, HTN, HLD, diet controlled DMII, BPH, Glaucoma admitted for:       # Strep Dysgalactiae Bacteremia, suspected 2/2 Microaspiration PNA , also suspected LLE cellulitis.    - sepsis not present on arrival   - wbc trending down  - IV Cefepime   - S/p  IV vancomycin x 1 dose   - Repeat BCX pending   - ID F/u appreciated     # LLE edema with erythema, edema   and pain   - infection vs inflammatory ?  - still with significant erythema  and edema despite IV Abxs   - LLE Doppler neg for DVT   - will check XR, ESR/CRP, Uric acid   - C/w IV Abxs  - Tylenol for pain       # Bradycardia  - noted on outPt Holter Monitor   - c/w tele   - EP eval appreciated, plan for PPM when infection cleared   - EP / Cardiology following    # Abnormal CT scan  - Indeterminate pancreatic lesion in uncinate process measuring 1.5 cm, potentially a side branch IPMN. Contrast enhanced abdominal MRI may be performed non emergently for further characterization as clinically warranted.  - this can be followed up as an outpatient    # Constipation  - po laxative, will add Senna and Suppository   - out of bed    # BPH  C/w Flomax and Finasteride     # DVT PPXs: lovenox    89 y.o. male with PMHx of mild OLIVER, HTN, HLD, diet controlled DMII, BPH, Glaucoma admitted for:       # Strep Dysgalactiae Bacteremia, suspected 2/2 Microaspiration PNA , also suspected LLE cellulitis.    - sepsis not present on arrival   - wbc trending down  - IV Cefepime   - S/p  IV vancomycin x 1 dose   - Repeat BCX pending   - ID F/u appreciated     # LLE edema with erythema, edema   and pain   - infection vs inflammatory ?  - still with significant erythema  and edema despite IV Abxs   - LLE Doppler neg for DVT   - will check XR, ESR/CRP, Uric acid   - C/w IV Abxs  - Tylenol for pain       # Bradycardia  - noted on outPt Holter Monitor   - c/w tele   - EP eval appreciated, plan for PPM when infection cleared   - EP / Cardiology following        # RAZA/CKD 3  likely prerenal  renal Fx back to baseline after gentle IVF, d/c now  Encourage Po intake  Monitor UO   Monitor renal Fx     # Abnormal CT scan  - Indeterminate pancreatic lesion in uncinate process measuring 1.5 cm, potentially a side branch IPMN. Contrast enhanced abdominal MRI may be performed non emergently for further characterization as clinically warranted.  - this can be followed up as an outpatient    # Constipation  - po laxative, will add Senna and Suppository   - out of bed    # BPH  C/w Flomax and Finasteride     # DVT PPXs: lovenox

## 2021-08-19 LAB
ANION GAP SERPL CALC-SCNC: 6 MMOL/L — SIGNIFICANT CHANGE UP (ref 5–17)
BUN SERPL-MCNC: 28 MG/DL — HIGH (ref 7–23)
CALCIUM SERPL-MCNC: 9.7 MG/DL — SIGNIFICANT CHANGE UP (ref 8.5–10.1)
CHLORIDE SERPL-SCNC: 108 MMOL/L — SIGNIFICANT CHANGE UP (ref 96–108)
CO2 SERPL-SCNC: 24 MMOL/L — SIGNIFICANT CHANGE UP (ref 22–31)
CREAT SERPL-MCNC: 0.96 MG/DL — SIGNIFICANT CHANGE UP (ref 0.5–1.3)
CRP SERPL-MCNC: 67 MG/L — HIGH
ERYTHROCYTE [SEDIMENTATION RATE] IN BLOOD: 64 MM/HR — HIGH (ref 0–20)
GLUCOSE SERPL-MCNC: 119 MG/DL — HIGH (ref 70–99)
HCT VFR BLD CALC: 32.5 % — LOW (ref 39–50)
HGB BLD-MCNC: 11.2 G/DL — LOW (ref 13–17)
MCHC RBC-ENTMCNC: 33.9 PG — SIGNIFICANT CHANGE UP (ref 27–34)
MCHC RBC-ENTMCNC: 34.5 GM/DL — SIGNIFICANT CHANGE UP (ref 32–36)
MCV RBC AUTO: 98.5 FL — SIGNIFICANT CHANGE UP (ref 80–100)
PLATELET # BLD AUTO: 174 K/UL — SIGNIFICANT CHANGE UP (ref 150–400)
POTASSIUM SERPL-MCNC: 4 MMOL/L — SIGNIFICANT CHANGE UP (ref 3.5–5.3)
POTASSIUM SERPL-SCNC: 4 MMOL/L — SIGNIFICANT CHANGE UP (ref 3.5–5.3)
RBC # BLD: 3.3 M/UL — LOW (ref 4.2–5.8)
RBC # FLD: 13.2 % — SIGNIFICANT CHANGE UP (ref 10.3–14.5)
SODIUM SERPL-SCNC: 138 MMOL/L — SIGNIFICANT CHANGE UP (ref 135–145)
URATE SERPL-MCNC: 3 MG/DL — LOW (ref 3.4–8.8)
WBC # BLD: 8.41 K/UL — SIGNIFICANT CHANGE UP (ref 3.8–10.5)
WBC # FLD AUTO: 8.41 K/UL — SIGNIFICANT CHANGE UP (ref 3.8–10.5)

## 2021-08-19 PROCEDURE — 99232 SBSQ HOSP IP/OBS MODERATE 35: CPT

## 2021-08-19 RX ORDER — CEFTRIAXONE 500 MG/1
2000 INJECTION, POWDER, FOR SOLUTION INTRAMUSCULAR; INTRAVENOUS EVERY 24 HOURS
Refills: 0 | Status: DISCONTINUED | OUTPATIENT
Start: 2021-08-19 | End: 2021-08-24

## 2021-08-19 RX ORDER — HALOPERIDOL DECANOATE 100 MG/ML
0.5 INJECTION INTRAMUSCULAR ONCE
Refills: 0 | Status: COMPLETED | OUTPATIENT
Start: 2021-08-19 | End: 2021-08-19

## 2021-08-19 RX ADMIN — ENOXAPARIN SODIUM 40 MILLIGRAM(S): 100 INJECTION SUBCUTANEOUS at 12:35

## 2021-08-19 RX ADMIN — HALOPERIDOL DECANOATE 0.5 MILLIGRAM(S): 100 INJECTION INTRAMUSCULAR at 03:00

## 2021-08-19 RX ADMIN — Medication 1 DROP(S): at 22:27

## 2021-08-19 RX ADMIN — AMLODIPINE BESYLATE 5 MILLIGRAM(S): 2.5 TABLET ORAL at 12:34

## 2021-08-19 RX ADMIN — POLYETHYLENE GLYCOL 3350 17 GRAM(S): 17 POWDER, FOR SOLUTION ORAL at 22:28

## 2021-08-19 RX ADMIN — QUETIAPINE FUMARATE 25 MILLIGRAM(S): 200 TABLET, FILM COATED ORAL at 22:28

## 2021-08-19 RX ADMIN — ATORVASTATIN CALCIUM 40 MILLIGRAM(S): 80 TABLET, FILM COATED ORAL at 22:28

## 2021-08-19 RX ADMIN — TAMSULOSIN HYDROCHLORIDE 0.4 MILLIGRAM(S): 0.4 CAPSULE ORAL at 22:28

## 2021-08-19 RX ADMIN — CEFTRIAXONE 2000 MILLIGRAM(S): 500 INJECTION, POWDER, FOR SOLUTION INTRAMUSCULAR; INTRAVENOUS at 11:52

## 2021-08-19 RX ADMIN — FINASTERIDE 5 MILLIGRAM(S): 5 TABLET, FILM COATED ORAL at 12:35

## 2021-08-19 RX ADMIN — HALOPERIDOL DECANOATE 0.5 MILLIGRAM(S): 100 INJECTION INTRAMUSCULAR at 04:01

## 2021-08-19 RX ADMIN — Medication 1 DROP(S): at 12:02

## 2021-08-19 NOTE — PROGRESS NOTE ADULT - ASSESSMENT
89 y.o. male with PMHx of mild Dementia, HTN, HLD, diet controlled DMII, BPH, Glaucoma admitted on 8/13 for evaluation of progressive weakness on day of admission, then fevers; patient mildly coughing, history per medical record as patient is unable to provide history.    1. New LLE cellulitis. Pneumonia, most likely micro aspiration pneumonia. Sepsis with alpha hemolytic strep. CRF stage 3. Allergy to PCN.   - leukocytosis most likely reactive to infection  -encephalopathy  - follow up cultures --- found to have alpha hemolytic strep in blood--- Strep dysgalactiae, group C/G  - day # 6 cefepime  - tolerating antibiotics without rashes or side effects   - oxygen and nebs as needed   -aspiration precautions  - repeat blood cultures are negative to date  -change abx to ceftriaxone 2 gm IV qd  -reason for abx use and side effects reviewed with patient; monitor BMP   -continue abx coverage  -elevate legs  -monitor temps  -f/u CBC  -supportive care  2. Other issues:   -care per medicine

## 2021-08-19 NOTE — PROGRESS NOTE ADULT - ASSESSMENT
89 y.o. male with PMHx of mild OLIVER, HTN, HLD, diet controlled DMII, BPH, Glaucoma admitted for:       # Strep Dysgalactiae Bacteremia, suspected 2/2 Microaspiration PNA  vs LLE cellulitis.  - sepsis not present on arrival   - wbc trending down  - On IV Cefepime , changed to Ceftriaxone as perBCX sensitivities, Pt has h/o PCN/amoxicillin   - S/p  IV vancomycin x 1 dose   - f/u Repeat BCX    - LLE doppler neg for dvt  - D/w Dr Perez       # LLE edema with erythema, edema   and pain   -suspect cellulitis  -  ESR/CRP elevated  - Uric acid low  - XR L ankle with no Fx, no significant fluid   - LLE Doppler neg for DVT   - C/w IV Abxs  - Tylenol for pain       # Bradycardia  - noted on outPt Holter Monitor   - c/w tele   - EP eval appreciated, plan for PPM when infection cleared   - EP / Cardiology following  - may d/c tele         # RAZA/CKD 3  likely prerenal  renal Fx back to baseline after gentle IVF, d/c now  Encourage Po intake  Monitor UO   Monitor renal Fx     # Abnormal CT scan  - Indeterminate pancreatic lesion in uncinate process measuring 1.5 cm, potentially a side branch IPMN. Contrast enhanced abdominal MRI may be performed non emergently for further characterization as clinically warranted.  - this can be followed up as an outpatient    # Constipation  - po laxative, will add Senna and Suppository   - out of bed    # BPH  C/w Flomax and Finasteride     # DVT PPXs: lovenox    89 y.o. male with PMHx of mild OLIVER, HTN, HLD, diet controlled DMII, BPH, Glaucoma admitted for:       # Strep Dysgalactiae Bacteremia, suspected 2/2 Microaspiration PNA  vs LLE cellulitis.  - sepsis not present on arrival   - wbc trending down  - On IV Cefepime , changed to Ceftriaxone as perBCX sensitivities, Pt has h/o PCN/amoxicillin   - S/p  IV vancomycin x 1 dose   - f/u Repeat BCX    - LLE doppler neg for dvt  - D/w Dr Perez       # LLE edema with erythema, edema   and pain   -suspect cellulitis  -  ESR/CRP elevated  - Uric acid low  - XR L ankle with no Fx, no significant fluid   - LLE Doppler neg for DVT   - C/w IV Abxs  - Tylenol for pain       # Bradycardia  - noted on outPt Holter Monitor   - c/w tele   - EP eval appreciated, plan for PPM when infection cleared   - EP / Cardiology following  - may d/c tele         # RAZA/CKD 3  likely prerenal  renal Fx back to baseline after gentle IVF, d/c now  Encourage Po intake  Monitor UO   Monitor renal Fx     # Abnormal CT scan  - Indeterminate pancreatic lesion in uncinate process measuring 1.5 cm, potentially a side branch IPMN. Contrast enhanced abdominal MRI may be performed non emergently for further characterization as clinically warranted.  - this can be followed up as an outpatient    # Constipation  - po laxative, will add Senna and Suppository   - out of bed    # BPH  C/w Flomax and Finasteride     # Dementia, sun downing   C/w seroquel and haldol PRN      # HTN  C/w Amlodipine and valsartan     # DVT PPXs: lovenox

## 2021-08-19 NOTE — PROGRESS NOTE ADULT - SUBJECTIVE AND OBJECTIVE BOX
CC: weakness (18 Aug 2021 09:44)    HPI:  89 y.o. male with PMHx of mild OLIVER, HTN, HLD, diet controlled DMII, BPH, Glaucoma p/w progressive generalized weakness  to the point was hardly able to move - brought in by daughter and found to have fever 100.4 in ED with no definite source. Pt reported some pain now resolved in right groin and had difficulty to urinate requiring FQ placement.      INTERVAL HPI/ OVERNIGHT EVENTS: Pt was seen and examined, OOB to chair, reports r foot pain, but better with Tylenol. Baseline confused    Vital Signs Last 24 Hrs  T(C): 36.4 (19 Aug 2021 22:05), Max: 36.9 (19 Aug 2021 00:45)  T(F): 97.6 (19 Aug 2021 22:05), Max: 98.4 (19 Aug 2021 00:45)  HR: 69 (19 Aug 2021 22:05) (64 - 88)  BP: 114/60 (19 Aug 2021 22:05) (114/60 - 161/79)  BP(mean): 78 (19 Aug 2021 17:06) (78 - 89)  RR: 18 (19 Aug 2021 22:05) (18 - 18)  SpO2: 98% (19 Aug 2021 22:05) (95% - 98%)    REVIEW OF SYSTEMS:  All other review of systems is negative unless indicated above.      PHYSICAL EXAM:  General: Well developed;  frail elderly male  in no acute distress. [place]   Eyes:  EOMI; conjunctiva and sclera clear  Head: Normocephalic; atraumatic  ENMT: No nasal discharge; airway clear  Neck: Supple; non tender; no masses  Respiratory: No wheezes, rales or rhonchi  Cardiovascular: Regular rate and rhythm. S1 and S2 Normal;   Gastrointestinal: Soft non-tender non-distended; Normal bowel sounds  Genitourinary: No  suprapubic  tenderness  Extremities: LLE  with edema and erythema starting ankle up to mid mid calf with no clear border,  better today, less edema, tender to palpation at ankle   Vascular: Peripheral pulses palpable 2+ bilaterally  Neurological: Alert and oriented x2-3, non focal, forgetful   Musculoskeletal: Normal muscle tone and strength   Psychiatric: Cooperative    LABS:                         11.2   8.41  )-----------( 174      ( 19 Aug 2021 07:51 )             32.5     08-19    138  |  108  |  28<H>  ----------------------------<  119<H>  4.0   |  24  |  0.96    Ca    9.7      19 Aug 2021 07:51                        10.1   7.45  )-----------( 144      ( 18 Aug 2021 06:27 )             30.4     18 Aug 2021 06:27    139    |  108    |  26     ----------------------------<  110    4.1     |  27     |  1.13     Ca    9.1        18 Aug 2021 06:27      MEDICATIONS  (STANDING):  amLODIPine   Tablet 5 milliGRAM(s) Oral daily  atorvastatin 40 milliGRAM(s) Oral at bedtime  cefTRIAXone Injectable. 2000 milliGRAM(s) IV Push every 24 hours  enoxaparin Injectable 40 milliGRAM(s) SubCutaneous daily  finasteride 5 milliGRAM(s) Oral daily  polyethylene glycol 3350 17 Gram(s) Oral two times a day  tamsulosin 0.4 milliGRAM(s) Oral at bedtime  timolol 0.5% Solution 1 Drop(s) Both EYES two times a day  valsartan 160 milliGRAM(s) Oral daily    MEDICATIONS  (PRN):  acetaminophen    Suspension .. 650 milliGRAM(s) Oral every 6 hours PRN Temp greater or equal to 38C (100.4F), Moderate Pain (4 - 6)  bisacodyl 5 milliGRAM(s) Oral every 12 hours PRN Constipation  haloperidol    Injectable 0.5 milliGRAM(s) IntraMuscular at bedtime PRN agitation  QUEtiapine 25 milliGRAM(s) Oral at bedtime PRN agitation        RADIOLOGY & ADDITIONAL TESTS:  < from: US Duplex Venous Lower Ext Ltd, Left (08.16.21 @ 13:50) >  EXAM:  US DPLX LWR EXT VEINS LTD LT                            PROCEDURE DATE:  08/16/2021          INTERPRETATION:  CLINICAL INFORMATION: Left leg swelling and redness.    COMPARISON: None available.    TECHNIQUE: Duplex sonography of the LEFT LOWER extremity veins with color and spectral Doppler, with and without compression.    FINDINGS:    There is normal compressibility of the left common femoral, femoral and popliteal veins.  The contralateral common femoral vein is patent.  Doppler examination shows normal spontaneous and phasic flow.    No calf vein thrombosis is detected.    Subcutaneous edema in the left calf.    Note is made of a prominent left groin lymph node measuring 3.8 x 1.2 x 2.3 cm which maintains a normal morphology.    IMPRESSION:  No evidence of left lower extremity deep venous thrombosis.    < from: CT Chest No Cont (08.13.21 @ 23:29) >  EXAM:  CT CHEST                            PROCEDURE DATE:  08/13/2021          INTERPRETATION:  .  ACC: [id number]  INDICATION: Fever, abnormal chest radiograph  TECHNIQUE: Unenhanced CT of the chest. Coronal and sagittal images were reconstructed. Maximum intensity projection images were generated.  COMPARISON: No prior chest CT.    FINDINGS:    AIRWAYS, LUNGS and PLEURA: Patent central airways. Mild compressive atelectasis at the left base secondary to large hiatal hernia. Small calcified nodule within the left upper lobe. The lungs are otherwise clear. No pleural effusion.    MEDIASTINUM AND SADIE: No lymphadenopathy.    HEART AND VESSELS: Heart size is normal. No pericardial effusion. Thoracic aorta and pulmonary artery normal in diameter. Coronary and aortic calcifications.    VISUALIZED UPPER ABDOMEN: Large hiatal hernia containing intrathoracic stomach.    CHEST WALL AND BONES: No aggressive osseous lesion.    LOWER NECK: Within normal limits.    IMPRESSION:    Mild compressive atelectasis at the left base secondary to large hiatal hernia. The lungs are otherwise clear.

## 2021-08-19 NOTE — PROGRESS NOTE ADULT - SUBJECTIVE AND OBJECTIVE BOX
Date of service: 08-19-21 @ 10:43    Noted with left lower leg erythema, swelling and tenderness    ROS: no fever or chills; denies dizziness, no HA, no SOB or cough, no abdominal pain, no diarrhea or constipation; no dysuria    MEDICATIONS  (STANDING):  amLODIPine   Tablet 5 milliGRAM(s) Oral daily  atorvastatin 40 milliGRAM(s) Oral at bedtime  cefTRIAXone Injectable. 2000 milliGRAM(s) IV Push every 24 hours  enoxaparin Injectable 40 milliGRAM(s) SubCutaneous daily  finasteride 5 milliGRAM(s) Oral daily  polyethylene glycol 3350 17 Gram(s) Oral two times a day  tamsulosin 0.4 milliGRAM(s) Oral at bedtime  timolol 0.5% Solution 1 Drop(s) Both EYES two times a day  valsartan 160 milliGRAM(s) Oral daily    Vital Signs Last 24 Hrs  T(C): 36.5 (19 Aug 2021 08:37), Max: 36.9 (19 Aug 2021 00:45)  T(F): 97.7 (19 Aug 2021 08:37), Max: 98.4 (19 Aug 2021 00:45)  HR: 88 (19 Aug 2021 08:37) (64 - 88)  BP: 145/73 (19 Aug 2021 08:37) (134/64 - 145/73)  BP(mean): 89 (19 Aug 2021 08:37) (89 - 89)  RR: 18 (19 Aug 2021 08:37) (18 - 18)  SpO2: 96% (19 Aug 2021 08:37) (92% - 96%)     Physical exam:    Constitutional: frail looking  HEENT: NC/AT, EOMI, PERRLA, conjunctivae clear  Neck: supple; thyroid not palpable  Back: no tenderness  Respiratory: respiratory effort normal; crackles at bases, diminished breath sounds at bases  Cardiovascular: S1S2 regular, no murmurs  Abdomen: soft, not tender, not distended, positive BS  Genitourinary: no suprapubic tenderness  Musculoskeletal: no muscle tenderness, no joint swelling or tenderness  Ext: left ankle and lower extremity erythema, edema, tenderness and warmth  Neurological/ Psychiatric:  moving all extremities  Skin: no rashes; no palpable lesions    Labs: reviewed                        11.2   8.41  )-----------( 174      ( 19 Aug 2021 07:51 )             32.5     08-19    138  |  108  |  28<H>  ----------------------------<  119<H>  4.0   |  24  |  0.96    Ca    9.7      19 Aug 2021 07:51                          11.4   6.51  )-----------( 134      ( 16 Aug 2021 07:44 )             33.7     08-16    137  |  108  |  33<H>  ----------------------------<  94  3.5   |  25  |  1.16    Ca    9.4      16 Aug 2021 07:44    TPro  5.5<L>  /  Alb  2.2<L>  /  TBili  0.4  /  DBili  x   /  AST  92<H>  /  ALT  106<H>  /  AlkPhos  49  08-16      Culture - Urine (collected 13 Aug 2021 18:23)  Source: Clean Catch None  Final Report (16 Aug 2021 07:53):    <10,000 CFU/mL Normal Urogenital Makenna    Culture - Blood (collected 13 Aug 2021 17:00)  Source: .Blood None  Gram Stain (14 Aug 2021 21:48):    Growth in anaerobic bottle: Gram Positive Cocci in Pairs and Chains  Final Report (16 Aug 2021 16:01):    Growth in anaerobic bottle: Streptococcus dysgalactiae (Group C/G)  Organism: Blood Culture PCR  Streptococcus dysgalactiae  Streptococcus dysgalactiae (16 Aug 2021 16:01)  Organism: Streptococcus dysgalactiae (16 Aug 2021 16:01)      -  Ceftriaxone: S 0.032      -  Penicillin: S 0.016 Predicts results for ampicillin, amoxicillin, amoxicillin/clavulanate, ampicillin/sulbactam, 1st, 2nd and 3rd generation cephalosporins and carbapenems.      Method Type: ETEST  Organism: Streptococcus dysgalactiae (16 Aug 2021 16:01)      -  Clindamycin: R      -  Levofloxacin: S      -  Vancomycin: S      Method Type: KB  Organism: Blood Culture PCR (16 Aug 2021 16:01)      -  Streptococcus sp. (Not Grp A, B or S pneumoniae): Detec      Method Type: PCR    Culture - Blood (collected 13 Aug 2021 17:00)  Source: .Blood None  Preliminary Report (14 Aug 2021 23:02):    No growth to date.    < from: CT Chest No Cont (08.13.21 @ 23:29) >  Mild compressive atelectasis at the left base secondary to large hiatal hernia. The lungs are otherwise clear.  < end of copied text >    Radiology: all available radiological tests reviewed    Advanced directives addressed: DNR

## 2021-08-20 PROBLEM — E11.9 TYPE 2 DIABETES MELLITUS WITHOUT COMPLICATIONS: Chronic | Status: ACTIVE | Noted: 2021-08-13

## 2021-08-20 PROBLEM — G30.9 ALZHEIMER'S DISEASE, UNSPECIFIED: Chronic | Status: ACTIVE | Noted: 2021-08-13

## 2021-08-20 PROBLEM — H40.9 UNSPECIFIED GLAUCOMA: Chronic | Status: ACTIVE | Noted: 2021-08-13

## 2021-08-20 PROBLEM — R00.1 BRADYCARDIA, UNSPECIFIED: Chronic | Status: ACTIVE | Noted: 2021-08-13

## 2021-08-20 PROBLEM — Z87.438 PERSONAL HISTORY OF OTHER DISEASES OF MALE GENITAL ORGANS: Chronic | Status: ACTIVE | Noted: 2021-08-13

## 2021-08-20 PROBLEM — I10 ESSENTIAL (PRIMARY) HYPERTENSION: Chronic | Status: ACTIVE | Noted: 2021-08-13

## 2021-08-20 PROBLEM — E78.5 HYPERLIPIDEMIA, UNSPECIFIED: Chronic | Status: ACTIVE | Noted: 2021-08-13

## 2021-08-20 LAB
ANION GAP SERPL CALC-SCNC: 4 MMOL/L — LOW (ref 5–17)
BUN SERPL-MCNC: 29 MG/DL — HIGH (ref 7–23)
CALCIUM SERPL-MCNC: 9.4 MG/DL — SIGNIFICANT CHANGE UP (ref 8.5–10.1)
CHLORIDE SERPL-SCNC: 111 MMOL/L — HIGH (ref 96–108)
CO2 SERPL-SCNC: 24 MMOL/L — SIGNIFICANT CHANGE UP (ref 22–31)
CREAT SERPL-MCNC: 0.98 MG/DL — SIGNIFICANT CHANGE UP (ref 0.5–1.3)
GLUCOSE SERPL-MCNC: 95 MG/DL — SIGNIFICANT CHANGE UP (ref 70–99)
HCT VFR BLD CALC: 29.7 % — LOW (ref 39–50)
HGB BLD-MCNC: 9.9 G/DL — LOW (ref 13–17)
MCHC RBC-ENTMCNC: 33 PG — SIGNIFICANT CHANGE UP (ref 27–34)
MCHC RBC-ENTMCNC: 33.3 GM/DL — SIGNIFICANT CHANGE UP (ref 32–36)
MCV RBC AUTO: 99 FL — SIGNIFICANT CHANGE UP (ref 80–100)
PLATELET # BLD AUTO: 184 K/UL — SIGNIFICANT CHANGE UP (ref 150–400)
POTASSIUM SERPL-MCNC: 3.8 MMOL/L — SIGNIFICANT CHANGE UP (ref 3.5–5.3)
POTASSIUM SERPL-SCNC: 3.8 MMOL/L — SIGNIFICANT CHANGE UP (ref 3.5–5.3)
RBC # BLD: 3 M/UL — LOW (ref 4.2–5.8)
RBC # FLD: 13.2 % — SIGNIFICANT CHANGE UP (ref 10.3–14.5)
SODIUM SERPL-SCNC: 139 MMOL/L — SIGNIFICANT CHANGE UP (ref 135–145)
WBC # BLD: 4.83 K/UL — SIGNIFICANT CHANGE UP (ref 3.8–10.5)
WBC # FLD AUTO: 4.83 K/UL — SIGNIFICANT CHANGE UP (ref 3.8–10.5)

## 2021-08-20 PROCEDURE — 99232 SBSQ HOSP IP/OBS MODERATE 35: CPT

## 2021-08-20 RX ADMIN — VALSARTAN 160 MILLIGRAM(S): 80 TABLET ORAL at 11:10

## 2021-08-20 RX ADMIN — POLYETHYLENE GLYCOL 3350 17 GRAM(S): 17 POWDER, FOR SOLUTION ORAL at 22:20

## 2021-08-20 RX ADMIN — CEFTRIAXONE 2000 MILLIGRAM(S): 500 INJECTION, POWDER, FOR SOLUTION INTRAMUSCULAR; INTRAVENOUS at 11:10

## 2021-08-20 RX ADMIN — FINASTERIDE 5 MILLIGRAM(S): 5 TABLET, FILM COATED ORAL at 11:10

## 2021-08-20 RX ADMIN — ATORVASTATIN CALCIUM 40 MILLIGRAM(S): 80 TABLET, FILM COATED ORAL at 22:20

## 2021-08-20 RX ADMIN — AMLODIPINE BESYLATE 5 MILLIGRAM(S): 2.5 TABLET ORAL at 11:10

## 2021-08-20 RX ADMIN — ENOXAPARIN SODIUM 40 MILLIGRAM(S): 100 INJECTION SUBCUTANEOUS at 11:10

## 2021-08-20 RX ADMIN — Medication 1 DROP(S): at 11:11

## 2021-08-20 RX ADMIN — TAMSULOSIN HYDROCHLORIDE 0.4 MILLIGRAM(S): 0.4 CAPSULE ORAL at 22:20

## 2021-08-20 RX ADMIN — Medication 1 DROP(S): at 22:20

## 2021-08-20 NOTE — PROGRESS NOTE ADULT - ASSESSMENT
89 y.o. male with PMHx of mild OLIVER, HTN, HLD, diet controlled DMII, BPH, Glaucoma admitted for:       # Strep Dysgalactiae Bacteremia, suspected 2/2 Microaspiration PNA  vs LLE cellulitis.  - sepsis not present on arrival   - wbc trending down  - On IV Cefepime , changed to Ceftriaxone as perBCX sensitivities  - S/p  IV vancomycin x 1 dose   - f/u Repeat BCX - NGTD  - LLE doppler neg for dvt  - D/w Dr Perez     # LLE edema with erythema, edema   and pain   -suspect cellulitis  - ESR/CRP elevated  - Uric acid low  - XR L ankle with no Fx, no significant fluid   - LLE Doppler neg for DVT   - C/w IV Abxs  - Tylenol for pain     # Bradycardia  - noted on outPt Holter Monitor   - EP eval appreciated, pt cleared by ID for PPM, will discuss with EP team  - EP / Cardiology following      # RAZA/CKD 3  likely prerenal  renal Fx back to baseline after gentle IVF  Encourage Po intake  Monitor UO   Monitor renal Fx     # Abnormal CT scan  - Indeterminate pancreatic lesion in uncinate process measuring 1.5 cm, potentially a side branch IPMN. Contrast enhanced abdominal MRI may be performed non emergently for further characterization as clinically warranted.  - this can be followed up as an outpatient    # Constipation  - po laxative, will add Senna and Suppository   - out of bed    # BPH  C/w Flomax and Finasteride     # Dementia, sun downing   C/w seroquel and haldol PRN    # HTN  C/w Amlodipine and valsartan     # DVT PPXs: lovenox    89 y.o. male with PMHx of mild OLIVER, HTN, HLD, diet controlled DMII, BPH, Glaucoma admitted for:       # Strep Dysgalactiae Bacteremia, suspected 2/2 Microaspiration PNA  vs LLE cellulitis.  - sepsis not present on arrival   - wbc trending down  - On IV Cefepime , changed to Ceftriaxone as perBCX sensitivities  - S/p  IV vancomycin x 1 dose   - f/u Repeat BCX - NGTD  - LLE doppler neg for dvt  - D/w Dr Perez     # LLE edema with erythema, edema   and pain   -suspect cellulitis  - ESR/CRP elevated  - Uric acid low  - XR L ankle with no Fx, no significant fluid   - LLE Doppler neg for DVT   - C/w IV Abxs  - Tylenol for pain     # Bradycardia  - noted on outPt Holter Monitor   - EP eval appreciated, pt cleared by ID for PPM, EP NP notified, will plan for PPM placement on Monday 8/23  - EP / Cardiology following      # RAZA/CKD 3  likely prerenal  renal Fx back to baseline after gentle IVF  Encourage Po intake  Monitor UO   Monitor renal Fx     # Abnormal CT scan  - Indeterminate pancreatic lesion in uncinate process measuring 1.5 cm, potentially a side branch IPMN. Contrast enhanced abdominal MRI may be performed non emergently for further characterization as clinically warranted.  - this can be followed up as an outpatient    # Constipation  - po laxative, will add Senna and Suppository   - out of bed    # BPH  C/w Flomax and Finasteride     # Dementia, sun downing   C/w seroquel and haldol PRN    # HTN  C/w Amlodipine and valsartan     # DVT PPXs: lovenox     dispo: remain inpatient, continue IV abx, plan to switch to PO abx in 1-2 days, PPM placement on Monday 8/23

## 2021-08-20 NOTE — PROGRESS NOTE ADULT - ASSESSMENT
89 y.o. male with PMHx of mild Dementia, HTN, HLD, diet controlled DMII, BPH, Glaucoma admitted on 8/13 for evaluation of progressive weakness on day of admission, then fevers; patient mildly coughing, history per medical record as patient is unable to provide history.    1. LLE cellulitis. Aspiration pneumonia improving. Sepsis with alpha hemolytic strep. CRF stage 3. Allergy to PCN.   - leukocytosis resolving  -encephalopathy  - follow up cultures --- found to have alpha hemolytic strep in blood--- Strep dysgalactiae, group C/G  - s/p day # 6 cefepime  -on ceftriaxone 2 gm IV qd # 2  - tolerating antibiotics without rashes or side effects   -monitor closely in enrique of PCN allergy history  - oxygen and nebs as needed   -aspiration precautions  - repeat blood cultures are negative to date  -continue abx coverage  -elevate legs  -monitor temps  -f/u CBC  -supportive care  2. Other issues:   -care per medicine

## 2021-08-20 NOTE — CDI QUERY NOTE - NSCDIOTHERTXTBX2_GEN_ALL_CORE_FT
Altered Mental Status is a non-specific term. Can you further specify the condition to a more specific diagnosis? And is that the diagnosis that you have made to the underlying cause?     Could you please document  the etiology of the altered mental status such as:     a. Encephalopathy (a reversible condition)        -Type (anoxic, toxic –specify drug, hepatic, metabolic, hypertensive, traumatic, etc.)        -Acuity (acute, subacute, chronic)    b. Acute confusion/delirium  (specify if drug/alcohol induced)    c. Dementia or Alzheimer’s Dementia    d. Stupor/semi-coma      SUPPORTING DOCUMENTATION AND/OR CLINICAL EVIDENCE:    ID documents on 8/19  -encephalopathy    H&P :ROS: unable to obtain secondary to patient medical condition Constitutional: frail [id number] y.o. male with PMHx of mild OLIVER, HTN, HLD, diet controlled DMII, BPH, Glaucoma p/w weakness since this am - progressed through the day to the point was hardly able to move - brought in by daughter and found to have fever 100.4 in ED with no definite source. Impression:  Aspiration PNA, RAZA, Fever, Positive BC    ED note :Principal Discharge Dx Fever. Secondary Discharge Dx Altered mental status    SUPPORTING DOCUMENTATION AND/OR CLINICAL EVIDENCE:    LIVER FUNCTIONS - ( 16 Aug 2021 07:44 )  Alb: 2.2 g/dL / Pro: 5.5 gm/dL / ALK PHOS: 49 U/L / ALT: 106 U/L / AST: 92 U/L / GGT: x           Vital Signs:  T(F): 98.1 (16 Aug 2021 08:18), Max: 100.8 (14 Aug 2021 21:39)  HR: 56 (16 Aug 2021 08:18) (53 - 80)  BP: 133/68 (16 Aug 2021 08:18) (106/44 - 144/54)  RR: 18 (16 Aug 2021 08:18) (16 - 18)  SpO2: 99% (16 Aug 2021 08:18) (92% - 99%)

## 2021-08-20 NOTE — PROGRESS NOTE ADULT - SUBJECTIVE AND OBJECTIVE BOX
CC: weakness (18 Aug 2021 09:44)    HPI:  89 y.o. male with PMHx of mild OLIVER, HTN, HLD, diet controlled DMII, BPH, Glaucoma p/w progressive generalized weakness  to the point was hardly able to move - brought in by daughter and found to have fever 100.4 in ED with no definite source. Pt reported some pain now resolved in right groin and had difficulty to urinate requiring FQ placement.      INTERVAL HPI/ OVERNIGHT EVENTS: Pt seen and examined. Chart reviewed. patient with baseline confusion. No complaints today . cellulitis improving. no fevers.    Vital Signs Last 24 Hrs  T(C): 36.4 (20 Aug 2021 08:13), Max: 36.9 (19 Aug 2021 17:06)  T(F): 97.5 (20 Aug 2021 08:13), Max: 98.4 (19 Aug 2021 17:06)  HR: 56 (20 Aug 2021 08:13) (56 - 76)  BP: 157/51 (20 Aug 2021 08:13) (114/60 - 161/79)  BP(mean): 78 (19 Aug 2021 17:06) (78 - 78)  RR: 18 (19 Aug 2021 22:05) (18 - 18)  SpO2: 98% (20 Aug 2021 08:13) (96% - 98%)    REVIEW OF SYSTEMS:  All other review of systems is negative unless indicated above.      PHYSICAL EXAM:  General: Well developed;  frail elderly male  in no acute distress. [place]   Eyes:  EOMI; conjunctiva and sclera clear  Head: Normocephalic; atraumatic  ENMT: No nasal discharge; airway clear  Neck: Supple; non tender; no masses  Respiratory: No wheezes, rales or rhonchi  Cardiovascular: Regular rate and rhythm. S1 and S2 Normal;   Gastrointestinal: Soft non-tender non-distended; Normal bowel sounds  Genitourinary: No  suprapubic  tenderness  Extremities: LLE  with edema and erythema starting ankle up to mid mid calf with no clear border,  better today, less edema, tender to palpation at ankle   Vascular: Peripheral pulses palpable 2+ bilaterally  Neurological: Alert and oriented x2-3, non focal, forgetful   Musculoskeletal: Normal muscle tone and strength   Psychiatric: Cooperative    Labs                              9.9    4.83  )-----------( 184      ( 20 Aug 2021 09:13 )             29.7     20 Aug 2021 09:13    139    |  111    |  29     ----------------------------<  95     3.8     |  24     |  0.98     Ca    9.4        20 Aug 2021 09:13        CAPILLARY BLOOD GLUCOSE      RADIOLOGY & ADDITIONAL TESTS:  < from: US Duplex Venous Lower Ext Ltd, Left (08.16.21 @ 13:50) >  EXAM:  US DPLX LWR EXT VEINS LTD LT                            PROCEDURE DATE:  08/16/2021          INTERPRETATION:  CLINICAL INFORMATION: Left leg swelling and redness.    COMPARISON: None available.    TECHNIQUE: Duplex sonography of the LEFT LOWER extremity veins with color and spectral Doppler, with and without compression.    FINDINGS:    There is normal compressibility of the left common femoral, femoral and popliteal veins.  The contralateral common femoral vein is patent.  Doppler examination shows normal spontaneous and phasic flow.    No calf vein thrombosis is detected.    Subcutaneous edema in the left calf.    Note is made of a prominent left groin lymph node measuring 3.8 x 1.2 x 2.3 cm which maintains a normal morphology.    IMPRESSION:  No evidence of left lower extremity deep venous thrombosis.    < from: CT Chest No Cont (08.13.21 @ 23:29) >  EXAM:  CT CHEST                            PROCEDURE DATE:  08/13/2021          INTERPRETATION:  .  ACC: [id number]  INDICATION: Fever, abnormal chest radiograph  TECHNIQUE: Unenhanced CT of the chest. Coronal and sagittal images were reconstructed. Maximum intensity projection images were generated.  COMPARISON: No prior chest CT.    FINDINGS:    AIRWAYS, LUNGS and PLEURA: Patent central airways. Mild compressive atelectasis at the left base secondary to large hiatal hernia. Small calcified nodule within the left upper lobe. The lungs are otherwise clear. No pleural effusion.    MEDIASTINUM AND SADIE: No lymphadenopathy.    HEART AND VESSELS: Heart size is normal. No pericardial effusion. Thoracic aorta and pulmonary artery normal in diameter. Coronary and aortic calcifications.    VISUALIZED UPPER ABDOMEN: Large hiatal hernia containing intrathoracic stomach.    CHEST WALL AND BONES: No aggressive osseous lesion.    LOWER NECK: Within normal limits.    IMPRESSION:    Mild compressive atelectasis at the left base secondary to large hiatal hernia. The lungs are otherwise clear.    MEDICATIONS  (STANDING):  amLODIPine   Tablet 5 milliGRAM(s) Oral daily  atorvastatin 40 milliGRAM(s) Oral at bedtime  cefTRIAXone Injectable. 2000 milliGRAM(s) IV Push every 24 hours  enoxaparin Injectable 40 milliGRAM(s) SubCutaneous daily  finasteride 5 milliGRAM(s) Oral daily  polyethylene glycol 3350 17 Gram(s) Oral two times a day  tamsulosin 0.4 milliGRAM(s) Oral at bedtime  timolol 0.5% Solution 1 Drop(s) Both EYES two times a day  valsartan 160 milliGRAM(s) Oral daily    MEDICATIONS  (PRN):  acetaminophen    Suspension .. 650 milliGRAM(s) Oral every 6 hours PRN Temp greater or equal to 38C (100.4F), Moderate Pain (4 - 6)  bisacodyl 5 milliGRAM(s) Oral every 12 hours PRN Constipation  haloperidol    Injectable 0.5 milliGRAM(s) IntraMuscular at bedtime PRN agitation  QUEtiapine 25 milliGRAM(s) Oral at bedtime PRN agitation

## 2021-08-20 NOTE — INPATIENT CERTIFICATION FOR MEDICARE PATIENTS - CURRENT MEDICAL NEEDS AND CARE PLANS
[FreeTextEntry1] : Likely carpal tunnel syndrome. Cervical radiculopathy in the differential but appears less likely. She will return for EMG and nerve conduction studies. If carpal tunnel is confirmed initial management would consist of cock up splints at night
Possible Home

## 2021-08-20 NOTE — CHART NOTE - NSCHARTNOTEFT_GEN_A_CORE
89 yo male PMH HTN, HLD, Dementia, diet controlled diabetes, BPH, Glaucoma on timolol, admitted for evaluation of progressive weakness and fever.  Treated for bacteremia, aspiration PNA and lower extremity cellulitis. completing IV abx, remains afebrile in the last 24hrs and most recent blood cultures are negative.    He was found to have profound bradycardia and chronotropic incompetence on outpatient cardiac monitor and was recommended to have permanent pacemaker implant, but was awaiting resolution of infection and medical optimization.  Per medicine he is cleared from infectious disease standpoint to proceed with pacemaker implant.  I spoke with pts daughter about the procedure, at this point she is wary of her fathers overall deconditioning and recent infection to undergo pacemaker procedure.  advised pt that as long as pts condition continue to improve can offer this procedure to be performed next week prior to discharge.  will need repeat COVID swab pre-procedurally within 72hrs

## 2021-08-20 NOTE — CDI QUERY NOTE - NSCDIOTHERTXTBX_GEN_ALL_CORE_HH
Clinical documentation indicates that this patient has SIRS elements present on admission.  There is a need to further clarify the status of sepsis.  Please indicate status of the patient's sepsis diagnosis in your progress notes and discharge summary as : Sepsis treatment continues, or Sepsis resolving, or Sepsis resolved, or Sepsis ruled-out.     PRESENT ON ADMISSION: Was sepsis present on admission?  If so, please document.    a. Sepsis ruled in  b. Sepsis is resolving  c. Sepsis ruled out  d. Early Sepsis POA, resolved  e. SIRS with organ failure (such as RAZA, Encephalopathy, Respiratory Failure)  f.  SIRS without organ failure (such as RAZA, Encephalopathy, Respiratory Failure)  e. Unable to determine      Please clarify known or suspected organism and/or associated organ failure, if known and applicable.    SUPPORTING DOCUMENTATION AND/OR CLINICAL EVIDENCE:    Patient admitted with Fever, AMS (encephalopathy documented by ID) ,WBC 12 ,Positive Blood cultures, Procalcitonin 5    ID documents  on 8/17, 18, 19   Sepsis with alpha hemolytic strep. CRF stage 3. Allergy to PCN. Patient admitted with pneumonia, most likely micro aspiration pneumonia,     HPI Objective Statement: 89 y/o male presenting with weakness Fever and AMS,  pain now resolved in right groin and had difficulty to urinate requiring FQ placement. Urinary retention with right groin pain - neg UA, neg CT abd - Rodriguez placed, monitor UO, Hx of BPH on proscar  Recent bradycardia with plan to f/u with Dr. Montesinos on Monday - EKG was done in ED - unable to locate it, reordered - potentially related to aricept - was stopped  Large hiatal hernia on CXR and CT with stomach in chest LLL infiltrate/atelectasis worsening since this morning. Pt denies dysuria and coughing. Daughter describes it as listless. His words were slow to flow.+ Low abdominal pain earlier today.   Will cover for pna, although cxr unclear.  Given AMS and febrile illness will admit for further w/u and management.     Vital Signs on Admission:T38 HR77, RR /56 2 97%RA     WBC: WBC Count: 6.51 K/uL [3.80 - 10.50] (08-16-21)  WBC Count: 11.24 K/uL *H* [3.80 - 10.50] (08-15-21)  WBC Count: 12.07 K/uL *H* [3.80 - 10.50] (08-14-21)  WBC Count: 12.39 K/uL *H* [3.80 - 10.50] (08-13-21)                                    Lactate: Lactate, Blood: 1.6 mmol/L [0.7 - 2.0] (08-13-21)             Antibiotics:azithromycin  IVPB 255 mL/Hr IV Intermittent (08-13-21)cefepime  Injectable. 1000 milliGRAM(s) IV Push (08-15-21) 1000 milliGRAM(s) IV Push (08-16-21)  cefTRIAXone Injectable. 1000 milliGRAM(s) IV Push (08-13-21)

## 2021-08-21 PROCEDURE — 99232 SBSQ HOSP IP/OBS MODERATE 35: CPT

## 2021-08-21 PROCEDURE — 99223 1ST HOSP IP/OBS HIGH 75: CPT

## 2021-08-21 RX ADMIN — FINASTERIDE 5 MILLIGRAM(S): 5 TABLET, FILM COATED ORAL at 10:06

## 2021-08-21 RX ADMIN — POLYETHYLENE GLYCOL 3350 17 GRAM(S): 17 POWDER, FOR SOLUTION ORAL at 10:06

## 2021-08-21 RX ADMIN — Medication 1 DROP(S): at 10:07

## 2021-08-21 RX ADMIN — TAMSULOSIN HYDROCHLORIDE 0.4 MILLIGRAM(S): 0.4 CAPSULE ORAL at 22:16

## 2021-08-21 RX ADMIN — CEFTRIAXONE 2000 MILLIGRAM(S): 500 INJECTION, POWDER, FOR SOLUTION INTRAMUSCULAR; INTRAVENOUS at 10:07

## 2021-08-21 RX ADMIN — Medication 5 MILLIGRAM(S): at 10:06

## 2021-08-21 RX ADMIN — ATORVASTATIN CALCIUM 40 MILLIGRAM(S): 80 TABLET, FILM COATED ORAL at 22:16

## 2021-08-21 RX ADMIN — ENOXAPARIN SODIUM 40 MILLIGRAM(S): 100 INJECTION SUBCUTANEOUS at 10:06

## 2021-08-21 RX ADMIN — Medication 1 DROP(S): at 22:17

## 2021-08-21 RX ADMIN — VALSARTAN 160 MILLIGRAM(S): 80 TABLET ORAL at 10:06

## 2021-08-21 RX ADMIN — POLYETHYLENE GLYCOL 3350 17 GRAM(S): 17 POWDER, FOR SOLUTION ORAL at 22:16

## 2021-08-21 RX ADMIN — AMLODIPINE BESYLATE 5 MILLIGRAM(S): 2.5 TABLET ORAL at 10:06

## 2021-08-21 NOTE — CONSULT NOTE ADULT - SUBJECTIVE AND OBJECTIVE BOX
Patient is a 89y old  Male who presents with a chief complaint of weakness (21 Aug 2021 17:44)    89 yaer old man with dementia, glaucoma, HTN, HLD, admitted with sepsis, bradycardia, found to have ?pancreatic cyst on imaging.     Patient is without any acute GI complaints. No nausea or vomiting. No abdominal pain. No sick contacts or travel history. No overt signs of GI bleeding.     Planning for pacemaker on Monday.       PAST MEDICAL & SURGICAL HISTORY:  HTN (hypertension)    HLD (hyperlipidemia)    Diabetes    History of BPH    OLIVER (dementia of Alzheimer type)    Bradycardia    Glaucoma    S/P hernia repair        MEDICATIONS  (STANDING):  amLODIPine   Tablet 5 milliGRAM(s) Oral daily  atorvastatin 40 milliGRAM(s) Oral at bedtime  cefTRIAXone Injectable. 2000 milliGRAM(s) IV Push every 24 hours  enoxaparin Injectable 40 milliGRAM(s) SubCutaneous daily  finasteride 5 milliGRAM(s) Oral daily  polyethylene glycol 3350 17 Gram(s) Oral two times a day  tamsulosin 0.4 milliGRAM(s) Oral at bedtime  timolol 0.5% Solution 1 Drop(s) Both EYES two times a day  valsartan 160 milliGRAM(s) Oral daily    MEDICATIONS  (PRN):  acetaminophen    Suspension .. 650 milliGRAM(s) Oral every 6 hours PRN Temp greater or equal to 38C (100.4F), Moderate Pain (4 - 6)  bisacodyl 5 milliGRAM(s) Oral every 12 hours PRN Constipation  haloperidol    Injectable 0.5 milliGRAM(s) IntraMuscular at bedtime PRN agitation  QUEtiapine 25 milliGRAM(s) Oral at bedtime PRN agitation      Allergies    penicillins (Other)  tetracyclines (Other)    Intolerances        SOCIAL HISTORY: no smoking, drinking or drugs    FAMILY HISTORY: no colon or stomach cancer  Known health problems: none        REVIEW OF SYSTEMS:  Per HPI, unable to review all due to dementia    Vital Signs Last 24 Hrs  T(C): 36.8 (21 Aug 2021 16:27), Max: 37 (21 Aug 2021 07:24)  T(F): 98.2 (21 Aug 2021 16:27), Max: 98.6 (21 Aug 2021 07:24)  HR: 59 (21 Aug 2021 16:27) (59 - 63)  BP: 144/57 (21 Aug 2021 16:27) (140/55 - 149/58)  BP(mean): --  RR: 18 (21 Aug 2021 16:27) (18 - 18)  SpO2: 94% (21 Aug 2021 16:27) (94% - 95%)    PHYSICAL EXAM:    Constitutional: elderly but in no acute distress,non toxic  HEENT: masked, good phonation, not icteric  Neck: supple, no lymphadenopathy  Respiratory: clear to ascultation bilaterally, no wheezing  Cardiovascular: S1 and S2, regular rate and rhythm, no murmurs rubs or gallops  Gastrointestinal: soft, non-tender, non-distended, +bowel sounds, no rebound or guarding, no surgical scars, no drains  Extremities: No peripheral edema, no cyanosis or clubbing  Vascular: 2+ peripheral pulses, no venous stasis  Neurological: A/O x 3, no focal deficits, no asterixis  Psychiatric: Normal mood, normal affect  Skin: No rashes, not jaundiced    LABS:                        9.9    4.83  )-----------( 184      ( 20 Aug 2021 09:13 )             29.7     08-20    139  |  111<H>  |  29<H>  ----------------------------<  95  3.8   |  24  |  0.98    Ca    9.4      20 Aug 2021 09:13            RADIOLOGY & ADDITIONAL STUDIES: reviewed, ?1.5 cm cystic lesion in head of pancreas Patient is a 89y old  Male who presents with a chief complaint of weakness (21 Aug 2021 17:44)    89 yaer old man with dementia, glaucoma, HTN, HLD, admitted with sepsis, bradycardia, found to have ?pancreatic cyst on imaging.     Patient is without any acute GI complaints. No nausea or vomiting. No abdominal pain. No sick contacts or travel history. No overt signs of GI bleeding.     Planning for pacemaker on Monday.       PAST MEDICAL & SURGICAL HISTORY:  HTN (hypertension)    HLD (hyperlipidemia)    Diabetes    History of BPH    OLIVER (dementia of Alzheimer type)    Bradycardia    Glaucoma    S/P hernia repair        MEDICATIONS  (STANDING):  amLODIPine   Tablet 5 milliGRAM(s) Oral daily  atorvastatin 40 milliGRAM(s) Oral at bedtime  cefTRIAXone Injectable. 2000 milliGRAM(s) IV Push every 24 hours  enoxaparin Injectable 40 milliGRAM(s) SubCutaneous daily  finasteride 5 milliGRAM(s) Oral daily  polyethylene glycol 3350 17 Gram(s) Oral two times a day  tamsulosin 0.4 milliGRAM(s) Oral at bedtime  timolol 0.5% Solution 1 Drop(s) Both EYES two times a day  valsartan 160 milliGRAM(s) Oral daily    MEDICATIONS  (PRN):  acetaminophen    Suspension .. 650 milliGRAM(s) Oral every 6 hours PRN Temp greater or equal to 38C (100.4F), Moderate Pain (4 - 6)  bisacodyl 5 milliGRAM(s) Oral every 12 hours PRN Constipation  haloperidol    Injectable 0.5 milliGRAM(s) IntraMuscular at bedtime PRN agitation  QUEtiapine 25 milliGRAM(s) Oral at bedtime PRN agitation      Allergies    penicillins (Other)  tetracyclines (Other)    Intolerances        SOCIAL HISTORY: no smoking, drinking or drugs    FAMILY HISTORY: no colon or stomach cancer  Known health problems: none        REVIEW OF SYSTEMS:  Per HPI, unable to review all due to dementia    Vital Signs Last 24 Hrs  T(C): 36.8 (21 Aug 2021 16:27), Max: 37 (21 Aug 2021 07:24)  T(F): 98.2 (21 Aug 2021 16:27), Max: 98.6 (21 Aug 2021 07:24)  HR: 59 (21 Aug 2021 16:27) (59 - 63)  BP: 144/57 (21 Aug 2021 16:27) (140/55 - 149/58)  BP(mean): --  RR: 18 (21 Aug 2021 16:27) (18 - 18)  SpO2: 94% (21 Aug 2021 16:27) (94% - 95%)    PHYSICAL EXAM:    Constitutional: elderly but in no acute distress,non toxic, sitting up in bed  HEENT: unmasked, good phonation, not icteric  Neck: supple, no lymphadenopathy  Respiratory: clear to ascultation bilaterally, no wheezing  Cardiovascular: S1 and S2, regular rate and rhythm, no murmurs rubs or gallops  Gastrointestinal: soft, non-tender, non-distended, +bowel sounds, no rebound or guarding, no surgical scars, no drains  Extremities: No peripheral edema, no cyanosis or clubbing  Vascular: 2+ peripheral pulses, no venous stasis  Neurological: A/O x 3, no focal deficits, no asterixis  Psychiatric: Normal mood, normal affect  Skin: No rashes, not jaundiced    LABS:                        9.9    4.83  )-----------( 184      ( 20 Aug 2021 09:13 )             29.7     08-20    139  |  111<H>  |  29<H>  ----------------------------<  95  3.8   |  24  |  0.98    Ca    9.4      20 Aug 2021 09:13            RADIOLOGY & ADDITIONAL STUDIES: reviewed, ?1.5 cm cystic lesion in head of pancreas

## 2021-08-21 NOTE — CONSULT NOTE ADULT - ASSESSMENT
89 yaer old man with dementia, glaucoma, HTN, HLD, admitted with sepsis, bradycardia, found to have ?pancreatic cyst on imaging.     Would not aggressively work up as patient demented and 89 yaers old. If panc cyst then nothing to do. If mass lesion, would not recommend surgery or chemo.   Chronic anemia is stable, would not work up.

## 2021-08-21 NOTE — PROGRESS NOTE ADULT - ASSESSMENT
89 y.o. male with PMHx of mild OLIVER, HTN, HLD, diet controlled DMII, BPH, Glaucoma admitted for:       # Strep Dysgalactiae Bacteremia, suspected 2/2 Microaspiration PNA  vs LLE cellulitis.  - sepsis not present on arrival   - wbc trending down  - On IV Cefepime , changed to Ceftriaxone as perBCX sensitivities  - S/p  IV vancomycin x 1 dose   - f/u Repeat BCX - NGTD  - LLE doppler neg for dvt  - D/w Dr Perez     # LLE edema with erythema, edema   and pain   -suspect cellulitis  - ESR/CRP elevated  - Uric acid low  - XR L ankle with no Fx, no significant fluid   - LLE Doppler neg for DVT   - C/w IV Abxs  - Tylenol for pain     # Bradycardia  - noted on outPt Holter Monitor   - EP eval appreciated, pt cleared by ID for PPM, EP NP notified, will plan for PPM placement on Monday 8/23  - EP / Cardiology following      # RAZA/CKD 3  likely prerenal  renal Fx back to baseline after gentle IVF  Encourage Po intake  Monitor UO   Monitor renal Fx     # Abnormal CT scan  - Indeterminate pancreatic lesion in uncinate process measuring 1.5 cm, potentially a side branch IPMN. Contrast enhanced abdominal MRI may be performed non emergently for further characterization as clinically warranted.  - this can be followed up as an outpatient    # Constipation  - po laxative, will add Senna and Suppository   - out of bed    # BPH  C/w Flomax and Finasteride     # Dementia, sun downing   C/w seroquel and haldol PRN    # HTN  C/w Amlodipine and valsartan     # DVT PPXs: lovenox     dispo: remain inpatient, continue IV abx, plan to switch to PO abx in 1-2 days, PPM placement on Monday 8/23

## 2021-08-21 NOTE — PROGRESS NOTE ADULT - SUBJECTIVE AND OBJECTIVE BOX
CC:  Patient is a 89y old  Male who presents with a chief complaint of weakness (20 Aug 2021 13:30)    SUBJECTIVE:     -no new complaints or issues at current time.    ROS:  all other review of systems are negative unless indicated above.    acetaminophen    Suspension .. 650 milliGRAM(s) Oral every 6 hours PRN  amLODIPine   Tablet 5 milliGRAM(s) Oral daily  atorvastatin 40 milliGRAM(s) Oral at bedtime  bisacodyl 5 milliGRAM(s) Oral every 12 hours PRN  cefTRIAXone Injectable. 2000 milliGRAM(s) IV Push every 24 hours  enoxaparin Injectable 40 milliGRAM(s) SubCutaneous daily  finasteride 5 milliGRAM(s) Oral daily  haloperidol    Injectable 0.5 milliGRAM(s) IntraMuscular at bedtime PRN  polyethylene glycol 3350 17 Gram(s) Oral two times a day  QUEtiapine 25 milliGRAM(s) Oral at bedtime PRN  tamsulosin 0.4 milliGRAM(s) Oral at bedtime  timolol 0.5% Solution 1 Drop(s) Both EYES two times a day  valsartan 160 milliGRAM(s) Oral daily    T(C): 36.8 (08-21-21 @ 16:27), Max: 37 (08-21-21 @ 07:24)  HR: 59 (08-21-21 @ 16:27) (59 - 63)  BP: 144/57 (08-21-21 @ 16:27) (140/55 - 149/58)  RR: 18 (08-21-21 @ 16:27) (18 - 18)  SpO2: 94% (08-21-21 @ 16:27) (94% - 95%)    Constitutional: NAD.   HEENT: PERRL, EOMI, MMM.  Neck: Soft and supple, No carotid bruit, No JVD  Respiratory: Breath sounds are clear bilaterally, No wheezing, rales or rhonchi  Cardiovascular: S1 and S2, regular rate and rhythm, no murmur, rub or gallop.  Gastrointestinal: Bowel Sounds present, soft, nontender, nondistended, no guarding, no rebound, no mass.  Extremities: No peripheral edema  Vascular: 2+ peripheral pulses  Neurological: A/O x , no focal deficits  Musculoskeletal: 5/5 strength b/l upper and lower extremities  Skin:  no visible rashes.                         9.9    4.83  )-----------( 184      ( 20 Aug 2021 09:13 )             29.7       08-20    139  |  111<H>  |  29<H>  ----------------------------<  95  3.8   |  24  |  0.98    Ca    9.4      20 Aug 2021 09:13

## 2021-08-22 LAB
ANION GAP SERPL CALC-SCNC: 3 MMOL/L — LOW (ref 5–17)
BUN SERPL-MCNC: 26 MG/DL — HIGH (ref 7–23)
CALCIUM SERPL-MCNC: 9.3 MG/DL — SIGNIFICANT CHANGE UP (ref 8.5–10.1)
CHLORIDE SERPL-SCNC: 109 MMOL/L — HIGH (ref 96–108)
CO2 SERPL-SCNC: 26 MMOL/L — SIGNIFICANT CHANGE UP (ref 22–31)
CREAT SERPL-MCNC: 1.04 MG/DL — SIGNIFICANT CHANGE UP (ref 0.5–1.3)
CULTURE RESULTS: SIGNIFICANT CHANGE UP
CULTURE RESULTS: SIGNIFICANT CHANGE UP
GLUCOSE SERPL-MCNC: 121 MG/DL — HIGH (ref 70–99)
HCT VFR BLD CALC: 33.5 % — LOW (ref 39–50)
HGB BLD-MCNC: 11 G/DL — LOW (ref 13–17)
MCHC RBC-ENTMCNC: 32.7 PG — SIGNIFICANT CHANGE UP (ref 27–34)
MCHC RBC-ENTMCNC: 32.8 GM/DL — SIGNIFICANT CHANGE UP (ref 32–36)
MCV RBC AUTO: 99.7 FL — SIGNIFICANT CHANGE UP (ref 80–100)
PLATELET # BLD AUTO: 257 K/UL — SIGNIFICANT CHANGE UP (ref 150–400)
POTASSIUM SERPL-MCNC: 4.1 MMOL/L — SIGNIFICANT CHANGE UP (ref 3.5–5.3)
POTASSIUM SERPL-SCNC: 4.1 MMOL/L — SIGNIFICANT CHANGE UP (ref 3.5–5.3)
RBC # BLD: 3.36 M/UL — LOW (ref 4.2–5.8)
RBC # FLD: 13 % — SIGNIFICANT CHANGE UP (ref 10.3–14.5)
SARS-COV-2 RNA SPEC QL NAA+PROBE: SIGNIFICANT CHANGE UP
SODIUM SERPL-SCNC: 138 MMOL/L — SIGNIFICANT CHANGE UP (ref 135–145)
SPECIMEN SOURCE: SIGNIFICANT CHANGE UP
SPECIMEN SOURCE: SIGNIFICANT CHANGE UP
WBC # BLD: 7.21 K/UL — SIGNIFICANT CHANGE UP (ref 3.8–10.5)
WBC # FLD AUTO: 7.21 K/UL — SIGNIFICANT CHANGE UP (ref 3.8–10.5)

## 2021-08-22 PROCEDURE — 99232 SBSQ HOSP IP/OBS MODERATE 35: CPT

## 2021-08-22 RX ADMIN — TAMSULOSIN HYDROCHLORIDE 0.4 MILLIGRAM(S): 0.4 CAPSULE ORAL at 21:50

## 2021-08-22 RX ADMIN — FINASTERIDE 5 MILLIGRAM(S): 5 TABLET, FILM COATED ORAL at 10:31

## 2021-08-22 RX ADMIN — POLYETHYLENE GLYCOL 3350 17 GRAM(S): 17 POWDER, FOR SOLUTION ORAL at 10:31

## 2021-08-22 RX ADMIN — VALSARTAN 160 MILLIGRAM(S): 80 TABLET ORAL at 10:32

## 2021-08-22 RX ADMIN — CEFTRIAXONE 2000 MILLIGRAM(S): 500 INJECTION, POWDER, FOR SOLUTION INTRAMUSCULAR; INTRAVENOUS at 10:32

## 2021-08-22 RX ADMIN — ENOXAPARIN SODIUM 40 MILLIGRAM(S): 100 INJECTION SUBCUTANEOUS at 10:31

## 2021-08-22 RX ADMIN — ATORVASTATIN CALCIUM 40 MILLIGRAM(S): 80 TABLET, FILM COATED ORAL at 21:50

## 2021-08-22 RX ADMIN — Medication 1 DROP(S): at 17:50

## 2021-08-22 RX ADMIN — AMLODIPINE BESYLATE 5 MILLIGRAM(S): 2.5 TABLET ORAL at 10:31

## 2021-08-22 RX ADMIN — Medication 1 DROP(S): at 21:51

## 2021-08-22 NOTE — PROGRESS NOTE ADULT - ASSESSMENT
89 y.o. male with PMHx of mild Dementia, HTN, HLD, diet controlled DMII, BPH, Glaucoma admitted on 8/13 for evaluation of progressive weakness on day of admission, then fevers; patient mildly coughing, history per medical record as patient is unable to provide history.    1. LLE cellulitis. Aspiration pneumonia improving. Sepsis with alpha hemolytic strep resolving. CRF stage 3. Allergy to PCN.   -leg is improving  -encephalopathy improving  - follow up cultures --- found to have alpha hemolytic strep in blood--- Strep dysgalactiae, group C/G  - s/p day # 6 cefepime  -on ceftriaxone 2 gm IV qd # 4  - tolerating antibiotics without rashes or side effects   -monitor closely in [place] of PCN allergy history  - oxygen and nebs as needed   -aspiration precautions  - repeat blood cultures are negative to date  -continue abx coverage  -elevate legs  -monitor temps  -f/u CBC  -supportive care  2. Other issues:   -care per medicine

## 2021-08-22 NOTE — PROGRESS NOTE ADULT - SUBJECTIVE AND OBJECTIVE BOX
CC:  Patient is a 89y old  Male who presents with a chief complaint of weakness (22 Aug 2021 12:15)    SUBJECTIVE:     -no new complaints or issues at current time.    ROS:  all other review of systems are negative unless indicated above.    acetaminophen    Suspension .. 650 milliGRAM(s) Oral every 6 hours PRN  amLODIPine   Tablet 5 milliGRAM(s) Oral daily  atorvastatin 40 milliGRAM(s) Oral at bedtime  bisacodyl 5 milliGRAM(s) Oral every 12 hours PRN  cefTRIAXone Injectable. 2000 milliGRAM(s) IV Push every 24 hours  enoxaparin Injectable 40 milliGRAM(s) SubCutaneous daily  finasteride 5 milliGRAM(s) Oral daily  haloperidol    Injectable 0.5 milliGRAM(s) IntraMuscular at bedtime PRN  polyethylene glycol 3350 17 Gram(s) Oral two times a day  QUEtiapine 25 milliGRAM(s) Oral at bedtime PRN  tamsulosin 0.4 milliGRAM(s) Oral at bedtime  timolol 0.5% Solution 1 Drop(s) Both EYES two times a day  valsartan 160 milliGRAM(s) Oral daily    T(C): 37.1 (08-22-21 @ 09:17), Max: 37.1 (08-22-21 @ 09:17)  HR: 69 (08-22-21 @ 09:17) (59 - 69)  BP: 139/58 (08-22-21 @ 09:17) (124/60 - 144/57)  RR: 18 (08-21-21 @ 23:46) (18 - 18)  SpO2: 94% (08-21-21 @ 23:46) (94% - 94%)    Constitutional: NAD.   HEENT: PERRL, EOMI, MMM.  Neck: Soft and supple, No carotid bruit, No JVD  Respiratory: Breath sounds are clear bilaterally, No wheezing, rales or rhonchi  Cardiovascular: S1 and S2, regular rate and rhythm, no murmur, rub or gallop.  Gastrointestinal: Bowel Sounds present, soft, nontender, nondistended, no guarding, no rebound, no mass.  Extremities: No peripheral edema  Vascular: 2+ peripheral pulses  Neurological: A/O x , no focal deficits  Musculoskeletal: 5/5 strength b/l upper and lower extremities  Skin:  no visible rashes.                         11.0   7.21  )-----------( 257      ( 22 Aug 2021 08:49 )             33.5       08-22    138  |  109<H>  |  26<H>  ----------------------------<  121<H>  4.1   |  26  |  1.04    Ca    9.3      22 Aug 2021 08:49

## 2021-08-22 NOTE — PROGRESS NOTE ADULT - SUBJECTIVE AND OBJECTIVE BOX
Date of service: 08-22-21 @ 12:15    Lying in bed in NAD  Left leg redness is resolving  Has local tenderness    ROS: no fever or chills; denies dizziness, no HA, no SOB or cough, no abdominal pain, no diarrhea or constipation; no dysuria    MEDICATIONS  (STANDING):  amLODIPine   Tablet 5 milliGRAM(s) Oral daily  atorvastatin 40 milliGRAM(s) Oral at bedtime  cefTRIAXone Injectable. 2000 milliGRAM(s) IV Push every 24 hours  enoxaparin Injectable 40 milliGRAM(s) SubCutaneous daily  finasteride 5 milliGRAM(s) Oral daily  polyethylene glycol 3350 17 Gram(s) Oral two times a day  tamsulosin 0.4 milliGRAM(s) Oral at bedtime  timolol 0.5% Solution 1 Drop(s) Both EYES two times a day  valsartan 160 milliGRAM(s) Oral daily    Vital Signs Last 24 Hrs  T(C): 37.1 (22 Aug 2021 09:17), Max: 37.1 (22 Aug 2021 09:17)  T(F): 98.7 (22 Aug 2021 09:17), Max: 98.7 (22 Aug 2021 09:17)  HR: 69 (22 Aug 2021 09:17) (59 - 69)  BP: 139/58 (22 Aug 2021 09:17) (124/60 - 144/57)  BP(mean): --  RR: 18 (21 Aug 2021 23:46) (18 - 18)  SpO2: 94% (21 Aug 2021 23:46) (94% - 94%)     Physical exam:    Constitutional: frail looking  HEENT: NC/AT, EOMI, PERRLA, conjunctivae clear  Neck: supple; thyroid not palpable  Back: no tenderness  Respiratory: respiratory effort normal; crackles at bases, diminished breath sounds at bases  Cardiovascular: S1S2 regular, no murmurs  Abdomen: soft, not tender, not distended, positive BS  Genitourinary: no suprapubic tenderness  Musculoskeletal: no muscle tenderness, no joint swelling or tenderness  Ext: left ankle and lower extremity erythema, edema, tenderness and warmth - improving slowly  Neurological/ Psychiatric:  moving all extremities  Skin: no rashes; no palpable lesions    Labs: reviewed                        11.0   7.21  )-----------( 257      ( 22 Aug 2021 08:49 )             33.5     08-22    138  |  109<H>  |  26<H>  ----------------------------<  121<H>  4.1   |  26  |  1.04    Ca    9.3      22 Aug 2021 08:49    C-Reactive Protein, Serum: 67 mg/L (08-19-21 @ 07:51)                        11.4   6.51  )-----------( 134      ( 16 Aug 2021 07:44 )             33.7     08-16    137  |  108  |  33<H>  ----------------------------<  94  3.5   |  25  |  1.16    Ca    9.4      16 Aug 2021 07:44    TPro  5.5<L>  /  Alb  2.2<L>  /  TBili  0.4  /  DBili  x   /  AST  92<H>  /  ALT  106<H>  /  AlkPhos  49  08-16      Culture - Urine (collected 13 Aug 2021 18:23)  Source: Clean Catch None  Final Report (16 Aug 2021 07:53):    <10,000 CFU/mL Normal Urogenital Makenna    Culture - Blood (collected 13 Aug 2021 17:00)  Source: .Blood None  Gram Stain (14 Aug 2021 21:48):    Growth in anaerobic bottle: Gram Positive Cocci in Pairs and Chains  Final Report (16 Aug 2021 16:01):    Growth in anaerobic bottle: Streptococcus dysgalactiae (Group C/G)  Organism: Blood Culture PCR  Streptococcus dysgalactiae  Streptococcus dysgalactiae (16 Aug 2021 16:01)  Organism: Streptococcus dysgalactiae (16 Aug 2021 16:01)      -  Ceftriaxone: S 0.032      -  Penicillin: S 0.016 Predicts results for ampicillin, amoxicillin, amoxicillin/clavulanate, ampicillin/sulbactam, 1st, 2nd and 3rd generation cephalosporins and carbapenems.      Method Type: ETEST  Organism: Streptococcus dysgalactiae (16 Aug 2021 16:01)      -  Clindamycin: R      -  Levofloxacin: S      -  Vancomycin: S      Method Type: KB  Organism: Blood Culture PCR (16 Aug 2021 16:01)      -  Streptococcus sp. (Not Grp A, B or S pneumoniae): Detec      Method Type: PCR    Culture - Blood (collected 13 Aug 2021 17:00)  Source: .Blood None  Preliminary Report (14 Aug 2021 23:02):    No growth to date.    < from: CT Chest No Cont (08.13.21 @ 23:29) >  Mild compressive atelectasis at the left base secondary to large hiatal hernia. The lungs are otherwise clear.  < end of copied text >    Radiology: all available radiological tests reviewed    Advanced directives addressed: DNR

## 2021-08-23 PROCEDURE — 99232 SBSQ HOSP IP/OBS MODERATE 35: CPT

## 2021-08-23 PROCEDURE — 71045 X-RAY EXAM CHEST 1 VIEW: CPT | Mod: 26

## 2021-08-23 PROCEDURE — 33208 INSRT HEART PM ATRIAL & VENT: CPT | Mod: KX

## 2021-08-23 PROCEDURE — 93010 ELECTROCARDIOGRAM REPORT: CPT

## 2021-08-23 RX ORDER — AMIODARONE HYDROCHLORIDE 400 MG/1
150 TABLET ORAL ONCE
Refills: 0 | Status: COMPLETED | OUTPATIENT
Start: 2021-08-23 | End: 2021-08-23

## 2021-08-23 RX ORDER — VANCOMYCIN HCL 1 G
1000 VIAL (EA) INTRAVENOUS ONCE
Refills: 0 | Status: COMPLETED | OUTPATIENT
Start: 2021-08-23 | End: 2021-08-23

## 2021-08-23 RX ORDER — AMIODARONE HYDROCHLORIDE 400 MG/1
1 TABLET ORAL
Qty: 900 | Refills: 0 | Status: DISCONTINUED | OUTPATIENT
Start: 2021-08-23 | End: 2021-08-23

## 2021-08-23 RX ADMIN — Medication 650 MILLIGRAM(S): at 22:31

## 2021-08-23 RX ADMIN — AMIODARONE HYDROCHLORIDE 618 MILLIGRAM(S): 400 TABLET ORAL at 11:29

## 2021-08-23 RX ADMIN — Medication 1 DROP(S): at 22:03

## 2021-08-23 RX ADMIN — ATORVASTATIN CALCIUM 40 MILLIGRAM(S): 80 TABLET, FILM COATED ORAL at 21:58

## 2021-08-23 RX ADMIN — POLYETHYLENE GLYCOL 3350 17 GRAM(S): 17 POWDER, FOR SOLUTION ORAL at 21:58

## 2021-08-23 RX ADMIN — Medication 250 MILLIGRAM(S): at 10:00

## 2021-08-23 RX ADMIN — Medication 650 MILLIGRAM(S): at 22:01

## 2021-08-23 RX ADMIN — TAMSULOSIN HYDROCHLORIDE 0.4 MILLIGRAM(S): 0.4 CAPSULE ORAL at 21:58

## 2021-08-23 RX ADMIN — CEFTRIAXONE 2000 MILLIGRAM(S): 500 INJECTION, POWDER, FOR SOLUTION INTRAMUSCULAR; INTRAVENOUS at 21:59

## 2021-08-23 RX ADMIN — QUETIAPINE FUMARATE 25 MILLIGRAM(S): 200 TABLET, FILM COATED ORAL at 21:58

## 2021-08-23 NOTE — PROGRESS NOTE ADULT - SUBJECTIVE AND OBJECTIVE BOX
CC:  Patient is a 89y old  Male who presents with a chief complaint of weakness (22 Aug 2021 14:33)    SUBJECTIVE:     -no new complaints or issues at current time.    ROS:  all other review of systems are negative unless indicated above.    acetaminophen    Suspension .. 650 milliGRAM(s) Oral every 6 hours PRN  amLODIPine   Tablet 5 milliGRAM(s) Oral daily  atorvastatin 40 milliGRAM(s) Oral at bedtime  bisacodyl 5 milliGRAM(s) Oral every 12 hours PRN  cefTRIAXone Injectable. 2000 milliGRAM(s) IV Push every 24 hours  enoxaparin Injectable 40 milliGRAM(s) SubCutaneous daily  finasteride 5 milliGRAM(s) Oral daily  haloperidol    Injectable 0.5 milliGRAM(s) IntraMuscular at bedtime PRN  polyethylene glycol 3350 17 Gram(s) Oral two times a day  QUEtiapine 25 milliGRAM(s) Oral at bedtime PRN  tamsulosin 0.4 milliGRAM(s) Oral at bedtime  timolol 0.5% Solution 1 Drop(s) Both EYES two times a day  valsartan 160 milliGRAM(s) Oral daily    T(C): 35.9 (08-23-21 @ 11:35), Max: 36.4 (08-23-21 @ 00:00)  HR: 69 (08-23-21 @ 17:25) (52 - 136)  BP: 114/63 (08-23-21 @ 17:25) (109/59 - 162/68)  RR: 18 (08-23-21 @ 17:25) (16 - 18)  SpO2: 96% (08-23-21 @ 17:25) (93% - 100%)    Constitutional: NAD.   HEENT: PERRL, EOMI, MMM.  Neck: Soft and supple, No carotid bruit, No JVD  Respiratory: Breath sounds are clear bilaterally, No wheezing, rales or rhonchi  Cardiovascular: S1 and S2, regular rate and rhythm, no murmur, rub or gallop.  Gastrointestinal: Bowel Sounds present, soft, nontender, nondistended, no guarding, no rebound, no mass.  Extremities: No peripheral edema  Vascular: 2+ peripheral pulses  Neurological: A/O x , no focal deficits  Musculoskeletal: 5/5 strength b/l upper and lower extremities  Skin:  no visible rashes.                         11.0   7.21  )-----------( 257      ( 22 Aug 2021 08:49 )             33.5       08-22    138  |  109<H>  |  26<H>  ----------------------------<  121<H>  4.1   |  26  |  1.04    Ca    9.3      22 Aug 2021 08:49

## 2021-08-23 NOTE — CDI QUERY NOTE - [id number]_GEN_ALL_CORE_FT
Altered Mental Status is a non-specific term. Can you further specify the condition to a more specific diagnosis? And is that the diagnosis that you have made to the underlying cause?     Could you please document  the etiology of the altered mental status such as:     a. Encephalopathy (a reversible condition)        -Type (anoxic, toxic –specify drug, hepatic, metabolic, hypertensive, traumatic, etc.)        -Acuity (acute, subacute, chronic)    b. Acute confusion/delirium  (specify if drug/alcohol induced)    c. Dementia or Alzheimer’s Dementia    d. Stupor/semi-coma      SUPPORTING DOCUMENTATION AND/OR CLINICAL EVIDENCE:    ID documents on 8/19  -encephalopathy    H&P :ROS: unable to obtain secondary to patient medical condition Constitutional: frail [id number] y.o. male with PMHx of mild OLIVER, HTN, HLD, diet controlled DMII, BPH, Glaucoma p/w weakness since this am - progressed through the day to the point was hardly able to move - brought in by daughter and found to have fever 100.4 in ED with no definite source. Impression:  Aspiration PNA, RAZA, Fever, Positive BC    ED note :Principal Discharge Dx Fever. Secondary Discharge Dx Altered mental status    SUPPORTING DOCUMENTATION AND/OR CLINICAL EVIDENCE:    LIVER FUNCTIONS - ( 16 Aug 2021 07:44 )  Alb: 2.2 g/dL / Pro: 5.5 gm/dL / ALK PHOS: 49 U/L / ALT: 106 U/L / AST: 92 U/L / GGT: x           Vital Signs:  T(F): 98.1 (16 Aug 2021 08:18), Max: 100.8 (14 Aug 2021 21:39)  HR: 56 (16 Aug 2021 08:18) (53 - 80)  BP: 133/68 (16 Aug 2021 08:18) (106/44 - 144/54)  RR: 18 (16 Aug 2021 08:18) (16 - 18)  SpO2: 99% (16 Aug 2021 08:18) (92% - 99%)

## 2021-08-23 NOTE — PROCEDURE NOTE - GENERAL PROCEDURE DETAILS
successful insertion of dual chamber PPM left parasternal region via left axillary vein, [facility]. Normal lead testing. Afib during the case

## 2021-08-23 NOTE — PRE-OP CHECKLIST - AS BP NONINV SITE
Please  add to patients problem list: diagnosis of cellulitis of abdomen wall from ER visit.  Call Romy if any questions.   right upper arm

## 2021-08-23 NOTE — PROGRESS NOTE ADULT - ASSESSMENT
89 y.o. male with PMHx of mild OLIVER, HTN, HLD, diet controlled DMII, BPH, Glaucoma admitted for:       # Strep Dysgalactiae Bacteremia, suspected 2/2 Microaspiration PNA  vs LLE cellulitis.  - sepsis not present on arrival   - wbc trending down  - On IV Cefepime , changed to Ceftriaxone as perBCX sensitivities  - S/p  IV vancomycin x 1 dose   - f/u Repeat BCX - NGTD  - LLE doppler neg for dvt  - ID.    # LLE edema with erythema, edema   and pain   -suspect cellulitis  - ESR/CRP elevated  - Uric acid low  - XR L ankle with no Fx, no significant fluid   - LLE Doppler neg for DVT   - C/w IV Abxs  - Tylenol for pain     # Bradycardia  - 08/23 PPM placed.  - EP / Cardiology following      # RAZA/CKD 3  likely prerenal  renal Fx back to baseline after gentle IVF  Encourage Po intake  Monitor UO   Monitor renal Fx     # Abnormal CT scan  - Indeterminate pancreatic lesion in uncinate process measuring 1.5 cm, potentially a side branch IPMN. Contrast enhanced abdominal MRI may be performed non emergently for further characterization as clinically warranted.  - this can be followed up as an outpatient    # Constipation  - po laxative, will add Senna and Suppository   - out of bed    # BPH  C/w Flomax and Finasteride     # Dementia, sun downing   C/w seroquel and haldol PRN    # HTN  C/w Amlodipine and valsartan     # DVT PPXs: lovenox     dispo: remain inpatient, continue IV abx, plan to switch to PO abx in 1-2 days.

## 2021-08-23 NOTE — CDI QUERY NOTE - NSCDIOTHERTXTBX_GEN_ALL_CORE_HH
Clinical documentation indicates that this patient has SIRS elements present on admission.  There is a need to further clarify the status of sepsis.  Please indicate status of the patient's sepsis diagnosis in your progress notes and discharge summary as : Sepsis treatment continues, or Sepsis resolving, or Sepsis resolved, or Sepsis ruled-out.     PRESENT ON ADMISSION: Was sepsis present on admission?  If so, please document.    a. Sepsis ruled in  b. Sepsis is resolving  c. Sepsis ruled out  d. Early Sepsis POA, resolved  e. SIRS with organ failure (such as RAZA, Encephalopathy, Respiratory Failure)  f.  SIRS without organ failure (such as RAZA, Encephalopathy, Respiratory Failure)  e. Unable to determine      Please clarify known or suspected organism and/or associated organ failure, if known and applicable.    SUPPORTING DOCUMENTATION AND/OR CLINICAL EVIDENCE:    Patient admitted with Fever, AMS (encephalopathy documented by ID) ,WBC 12 ,Positive Blood cultures, Procalcitonin 5    ID documents  on 8/17, 18, 19   Sepsis with alpha hemolytic strep. CRF stage 3. Allergy to PCN. Patient admitted with pneumonia, most likely micro aspiration pneumonia,     HPI Objective Statement: 89 y/o male presenting with weakness Fever and AMS,  pain now resolved in right groin and had difficulty to urinate requiring FQ placement. Urinary retention with right groin pain - neg UA, neg CT abd - Rodriguez placed, monitor UO, Hx of BPH on proscar  Recent bradycardia with plan to f/u with Dr. Montesinos on Monday - EKG was done in ED - unable to locate it, reordered - potentially related to aricept - was stopped  Large hiatal hernia on CXR and CT with stomach in chest LLL infiltrate/atelectasis worsening since this morning. Pt denies dysuria and coughing. Daughter describes it as listless. His words were slow to flow.+ Low abdominal pain earlier today.   Will cover for pna, although cxr unclear.  Given AMS and febrile illness will admit for further w/u and management.     Vital Signs on Admission:T38 HR77, RR /56 2 97%RA     WBC: WBC Count: 6.51 K/uL [3.80 - 10.50] (08-16-21)  WBC Count: 11.24 K/uL *H* [3.80 - 10.50] (08-15-21)  WBC Count: 12.07 K/uL *H* [3.80 - 10.50] (08-14-21)  WBC Count: 12.39 K/uL *H* [3.80 - 10.50] (08-13-21)                                    Lactate: Lactate, Blood: 1.6 mmol/L [0.7 - 2.0] (08-13-21)             Antibiotics:azithromycin  IVPB 255 mL/Hr IV Intermittent (08-13-21)cefepime  Injectable. 1000 milliGRAM(s) IV Push (08-15-21) 1000 milliGRAM(s) IV Push (08-16-21)  cefTRIAXone Injectable. 1000 milliGRAM(s) IV Push (08-13-21)

## 2021-08-23 NOTE — PROCEDURE NOTE - ADDITIONAL PROCEDURE DETAILS
follow post op orders  amiodarone IV for new onset AF possibly procedure related if terminated soon stop amio; if does not terminate then start eliquis 2.5mg bid tonight

## 2021-08-24 LAB
ANION GAP SERPL CALC-SCNC: 1 MMOL/L — LOW (ref 5–17)
BASOPHILS # BLD AUTO: 0.02 K/UL — SIGNIFICANT CHANGE UP (ref 0–0.2)
BASOPHILS NFR BLD AUTO: 0.2 % — SIGNIFICANT CHANGE UP (ref 0–2)
BUN SERPL-MCNC: 24 MG/DL — HIGH (ref 7–23)
CALCIUM SERPL-MCNC: 9.8 MG/DL — SIGNIFICANT CHANGE UP (ref 8.5–10.1)
CHLORIDE SERPL-SCNC: 110 MMOL/L — HIGH (ref 96–108)
CO2 SERPL-SCNC: 28 MMOL/L — SIGNIFICANT CHANGE UP (ref 22–31)
CREAT SERPL-MCNC: 0.99 MG/DL — SIGNIFICANT CHANGE UP (ref 0.5–1.3)
EOSINOPHIL # BLD AUTO: 0.17 K/UL — SIGNIFICANT CHANGE UP (ref 0–0.5)
EOSINOPHIL NFR BLD AUTO: 1.6 % — SIGNIFICANT CHANGE UP (ref 0–6)
GLUCOSE SERPL-MCNC: 101 MG/DL — HIGH (ref 70–99)
HCT VFR BLD CALC: 36 % — LOW (ref 39–50)
HGB BLD-MCNC: 11.8 G/DL — LOW (ref 13–17)
IMM GRANULOCYTES NFR BLD AUTO: 0.5 % — SIGNIFICANT CHANGE UP (ref 0–1.5)
LYMPHOCYTES # BLD AUTO: 1.05 K/UL — SIGNIFICANT CHANGE UP (ref 1–3.3)
LYMPHOCYTES # BLD AUTO: 10.1 % — LOW (ref 13–44)
MCHC RBC-ENTMCNC: 32.8 GM/DL — SIGNIFICANT CHANGE UP (ref 32–36)
MCHC RBC-ENTMCNC: 33.1 PG — SIGNIFICANT CHANGE UP (ref 27–34)
MCV RBC AUTO: 101.1 FL — HIGH (ref 80–100)
MONOCYTES # BLD AUTO: 0.77 K/UL — SIGNIFICANT CHANGE UP (ref 0–0.9)
MONOCYTES NFR BLD AUTO: 7.4 % — SIGNIFICANT CHANGE UP (ref 2–14)
NEUTROPHILS # BLD AUTO: 8.35 K/UL — HIGH (ref 1.8–7.4)
NEUTROPHILS NFR BLD AUTO: 80.2 % — HIGH (ref 43–77)
PLATELET # BLD AUTO: 270 K/UL — SIGNIFICANT CHANGE UP (ref 150–400)
POTASSIUM SERPL-MCNC: 4.1 MMOL/L — SIGNIFICANT CHANGE UP (ref 3.5–5.3)
POTASSIUM SERPL-SCNC: 4.1 MMOL/L — SIGNIFICANT CHANGE UP (ref 3.5–5.3)
RBC # BLD: 3.56 M/UL — LOW (ref 4.2–5.8)
RBC # FLD: 13.2 % — SIGNIFICANT CHANGE UP (ref 10.3–14.5)
SODIUM SERPL-SCNC: 139 MMOL/L — SIGNIFICANT CHANGE UP (ref 135–145)
WBC # BLD: 10.41 K/UL — SIGNIFICANT CHANGE UP (ref 3.8–10.5)
WBC # FLD AUTO: 10.41 K/UL — SIGNIFICANT CHANGE UP (ref 3.8–10.5)

## 2021-08-24 PROCEDURE — 93010 ELECTROCARDIOGRAM REPORT: CPT

## 2021-08-24 PROCEDURE — 99232 SBSQ HOSP IP/OBS MODERATE 35: CPT

## 2021-08-24 RX ORDER — CEFUROXIME AXETIL 250 MG
500 TABLET ORAL EVERY 12 HOURS
Refills: 0 | Status: DISCONTINUED | OUTPATIENT
Start: 2021-08-24 | End: 2021-08-25

## 2021-08-24 RX ADMIN — POLYETHYLENE GLYCOL 3350 17 GRAM(S): 17 POWDER, FOR SOLUTION ORAL at 10:17

## 2021-08-24 RX ADMIN — ENOXAPARIN SODIUM 40 MILLIGRAM(S): 100 INJECTION SUBCUTANEOUS at 10:17

## 2021-08-24 RX ADMIN — POLYETHYLENE GLYCOL 3350 17 GRAM(S): 17 POWDER, FOR SOLUTION ORAL at 21:59

## 2021-08-24 RX ADMIN — AMLODIPINE BESYLATE 5 MILLIGRAM(S): 2.5 TABLET ORAL at 10:18

## 2021-08-24 RX ADMIN — Medication 1 DROP(S): at 10:18

## 2021-08-24 RX ADMIN — FINASTERIDE 5 MILLIGRAM(S): 5 TABLET, FILM COATED ORAL at 10:18

## 2021-08-24 RX ADMIN — Medication 500 MILLIGRAM(S): at 22:00

## 2021-08-24 RX ADMIN — CEFTRIAXONE 2000 MILLIGRAM(S): 500 INJECTION, POWDER, FOR SOLUTION INTRAMUSCULAR; INTRAVENOUS at 10:18

## 2021-08-24 RX ADMIN — TAMSULOSIN HYDROCHLORIDE 0.4 MILLIGRAM(S): 0.4 CAPSULE ORAL at 21:59

## 2021-08-24 RX ADMIN — VALSARTAN 160 MILLIGRAM(S): 80 TABLET ORAL at 10:18

## 2021-08-24 RX ADMIN — ATORVASTATIN CALCIUM 40 MILLIGRAM(S): 80 TABLET, FILM COATED ORAL at 21:59

## 2021-08-24 NOTE — CDI QUERY NOTE - NSCDI_DOCCLARIFY2_GEN_ALL_CORE_FT
Documentation clarification is required for accuracy in coding and billing, claim validation and reporting severity of illness, quality data and risk of mortality.

## 2021-08-24 NOTE — CDI QUERY NOTE - NSCDINOTEPOA_GEN_ALL_CORE_FT
Was the condition present on admission? If so, please document in the chart that “(the condition) was present on admission.”

## 2021-08-24 NOTE — PHYSICAL THERAPY INITIAL EVALUATION ADULT - DIAGNOSIS, PT EVAL
LLE cellulitis. Aspiration pneumonia improving. Sepsis with alpha hemolytic strep resolving. CRF stage 3 s/p PPM 8/23, LLE cellulitis. Aspiration pneumonia improving. Sepsis with alpha hemolytic strep resolving. CRF stage 3

## 2021-08-24 NOTE — PROGRESS NOTE ADULT - ASSESSMENT
89 year old male with history of HTN, dementia, HLD found to have profound bradycardia, SN dysfunction on outside holter monitor currently admitted with bactermia/ aspiration PNA, cellulitis.  He was cleared by infectious disease team/Medicine for PPM implant.     A/P: SN dysfunction with profound bradycardia, s/p dual PPM implantation, POD #1  Continue current meds.  Pt is on IV antibiotics per medicine.  Follow up as outpatient in EP clinic in 2 weeks.  Device interrogation this morning reveals normal function.  Stable from EP standpoint for discharge.

## 2021-08-24 NOTE — PROGRESS NOTE ADULT - ASSESSMENT
89 y.o. male with PMHx of mild OLIVER, HTN, HLD, diet controlled DMII, BPH, Glaucoma admitted for:       # Strep Dysgalactiae Bacteremia/ Sepsis suspected 2/2 Microaspiration PNA   and/or  LLE cellulitis.  - sepsis present on arrival ( fever, leukocytosis, tachycardia later)   - wbc trending down  - On IV Cefepime , changed to Ceftriaxone as per BCX sensitivities  - S/p  IV vancomycin x 1 dose   -  Repeat BCX - NGTD  - LLE doppler neg for dvt  - D/w Dr Perez, will c/w PO Ceftin x 5 more days     # LLE edema with erythema, edema   and pain   -suspect cellulitis  - ESR/CRP elevated  - Uric acid low  - XR L ankle with no Fx, no significant fluid   - LLE Doppler neg for DVT   - Improved on Ceftriaxone, change to Po Ceftin now   - Tylenol for pain     # Bradycardia  - 08/23 PPM placed.  - Post procedure CXR reviewed  - EP cleared for d/c       # RAZA/CKD 3  likely prerenal  renal Fx back to baseline after gentle IVF  Encourage Po intake  Monitor UO   Monitor renal Fx     # Abnormal CT scan  - Indeterminate pancreatic lesion in uncinate process measuring 1.5 cm, potentially a side branch IPMN. Contrast enhanced abdominal MRI may be performed non emergently for further characterization as clinically warranted.  - this can be followed up as an outpatient    # Constipation  - po laxative, will add Senna and Suppository   - out of bed    # BPH  C/w Flomax and Finasteride     # Dementia, sun downing, metabolic encephalopathy due to acute illness   at baseline now   C/w seroquel and haldol PRN    # HTN  C/w Amlodipine and valsartan     # Urinary retention  S/p  Rodriguez placement on admission   C/w Finasteride and Flomax  D/c Rodriguez, Voiding trial today       # DVT PPXs: lovenox     dispo: D/c planning, placement is pending, plan for CAITLNY

## 2021-08-24 NOTE — PROGRESS NOTE ADULT - SUBJECTIVE AND OBJECTIVE BOX
Date of service: 08-24-21 @ 13:23    Lying in bed in NAD  s/p PM placement  Alert and confused    ROS: no fever or chills; poorly verbal    MEDICATIONS  (STANDING):  amLODIPine   Tablet 5 milliGRAM(s) Oral daily  atorvastatin 40 milliGRAM(s) Oral at bedtime  cefTRIAXone Injectable. 2000 milliGRAM(s) IV Push every 24 hours  enoxaparin Injectable 40 milliGRAM(s) SubCutaneous daily  finasteride 5 milliGRAM(s) Oral daily  polyethylene glycol 3350 17 Gram(s) Oral two times a day  tamsulosin 0.4 milliGRAM(s) Oral at bedtime  timolol 0.5% Solution 1 Drop(s) Both EYES two times a day  valsartan 160 milliGRAM(s) Oral daily    Vital Signs Last 24 Hrs  T(C): 36.4 (24 Aug 2021 08:08), Max: 37.1 (23 Aug 2021 18:46)  T(F): 97.5 (24 Aug 2021 08:08), Max: 98.7 (23 Aug 2021 18:46)  HR: 60 (24 Aug 2021 08:08) (59 - 88)  BP: 157/63 (24 Aug 2021 08:08) (110/57 - 157/63)  BP(mean): --  RR: 18 (24 Aug 2021 08:08) (18 - 18)  SpO2: 94% (24 Aug 2021 08:08) (94% - 98%)     Physical exam:    Constitutional: frail looking  HEENT: NC/AT, EOMI, PERRLA, conjunctivae clear  Neck: supple; thyroid not palpable  Back: no tenderness  Respiratory: respiratory effort normal; crackles at bases, diminished breath sounds at bases  Cardiovascular: S1S2 regular, no murmurs  Abdomen: soft, not tender, not distended, positive BS  Genitourinary: no suprapubic tenderness  Musculoskeletal: no muscle tenderness, no joint swelling or tenderness  Ext: left ankle and lower extremity erythema, edema, tenderness and warmth - much improved  Neurological/ Psychiatric:  moving all extremities  Skin: no rashes; no palpable lesions    Labs: reviewed                        11.8   10.41 )-----------( 270      ( 24 Aug 2021 08:11 )             36.0     08-24    139  |  110<H>  |  24<H>  ----------------------------<  101<H>  4.1   |  28  |  0.99    Ca    9.8      24 Aug 2021 08:11    C-Reactive Protein, Serum: 67 mg/L (08-19-21 @ 07:51)                        11.0   7.21  )-----------( 257      ( 22 Aug 2021 08:49 )             33.5     08-22    138  |  109<H>  |  26<H>  ----------------------------<  121<H>  4.1   |  26  |  1.04    Ca    9.3      22 Aug 2021 08:49    C-Reactive Protein, Serum: 67 mg/L (08-19-21 @ 07:51)                        11.4   6.51  )-----------( 134      ( 16 Aug 2021 07:44 )             33.7     08-16    137  |  108  |  33<H>  ----------------------------<  94  3.5   |  25  |  1.16    Ca    9.4      16 Aug 2021 07:44    TPro  5.5<L>  /  Alb  2.2<L>  /  TBili  0.4  /  DBili  x   /  AST  92<H>  /  ALT  106<H>  /  AlkPhos  49  08-16      Culture - Urine (collected 13 Aug 2021 18:23)  Source: Clean Catch None  Final Report (16 Aug 2021 07:53):    <10,000 CFU/mL Normal Urogenital Makenna    Culture - Blood (collected 13 Aug 2021 17:00)  Source: .Blood None  Gram Stain (14 Aug 2021 21:48):    Growth in anaerobic bottle: Gram Positive Cocci in Pairs and Chains  Final Report (16 Aug 2021 16:01):    Growth in anaerobic bottle: Streptococcus dysgalactiae (Group C/G)  Organism: Blood Culture PCR  Streptococcus dysgalactiae  Streptococcus dysgalactiae (16 Aug 2021 16:01)  Organism: Streptococcus dysgalactiae (16 Aug 2021 16:01)      -  Ceftriaxone: S 0.032      -  Penicillin: S 0.016 Predicts results for ampicillin, amoxicillin, amoxicillin/clavulanate, ampicillin/sulbactam, 1st, 2nd and 3rd generation cephalosporins and carbapenems.      Method Type: ETEST  Organism: Streptococcus dysgalactiae (16 Aug 2021 16:01)      -  Clindamycin: R      -  Levofloxacin: S      -  Vancomycin: S      Method Type: KB  Organism: Blood Culture PCR (16 Aug 2021 16:01)      -  Streptococcus sp. (Not Grp A, B or S pneumoniae): Detec      Method Type: PCR    Culture - Blood (collected 13 Aug 2021 17:00)  Source: .Blood None  Preliminary Report (14 Aug 2021 23:02):    No growth to date.    < from: CT Chest No Cont (08.13.21 @ 23:29) >  Mild compressive atelectasis at the left base secondary to large hiatal hernia. The lungs are otherwise clear.  < end of copied text >    Radiology: all available radiological tests reviewed    Advanced directives addressed: DNR

## 2021-08-24 NOTE — PROGRESS NOTE ADULT - REASON FOR ADMISSION
weakness

## 2021-08-24 NOTE — PROGRESS NOTE ADULT - ASSESSMENT
89 y.o. male with PMHx of mild Dementia, HTN, HLD, diet controlled DMII, BPH, Glaucoma admitted on 8/13 for evaluation of progressive weakness on day of admission, then fevers; patient mildly coughing, history per medical record as patient is unable to provide history.    1. LLE cellulitis. Aspiration pneumonia improving. Sepsis with alpha hemolytic strep resolving. CRF stage 3. Allergy to PCN.   -leg is improving  -encephalopathy improved  - follow up cultures --- found to have alpha hemolytic strep in blood--- Strep dysgalactiae, group C/G  - s/p day # 6 cefepime  -on ceftriaxone 2 gm IV qd # 6  - tolerating antibiotics without rashes or side effects   -monitor closely in enrique of PCN allergy history  - oxygen and nebs as needed   -aspiration precautions  - repeat blood cultures are negative to date  -change abx to ceftin 500 mg PO q12h for 5 more days  -elevate legs  -monitor temps  -f/u CBC  -supportive care  2. Other issues:   -care per medicine

## 2021-08-24 NOTE — CDI QUERY NOTE - NSCDINOTECODERNAME_GEN_A_CORE_FT
Enma Lomeli CCDS MSN RN

## 2021-08-24 NOTE — PROGRESS NOTE ADULT - SUBJECTIVE AND OBJECTIVE BOX
CC: weakness (24 Aug 2021 13:22)    HPI:  89 y.o. male with PMHx of mild OLIVER, HTN, HLD, diet controlled DMII, BPH, Glaucoma p/w weakness since this am - progressed through the day to the point was hardly able to move - brought in by daughter and found to have fever 100.4 in ED with no definite source. Pt reported some pain now resolved in right groin and had difficulty to urinate requiring FQ placement.        INTERVAL HPI/OVERNIGHT EVENTS: chart reviewed, Pt was seen and examined, s/p PPM placement, tolerated well. Reports no pain, no SOB. baseline confused     Vital Signs Last 24 Hrs  T(C): 36.5 (24 Aug 2021 16:45), Max: 36.6 (23 Aug 2021 23:14)  T(F): 97.7 (24 Aug 2021 16:45), Max: 97.8 (23 Aug 2021 23:14)  HR: 63 (24 Aug 2021 16:45) (59 - 63)  BP: 135/69 (24 Aug 2021 16:45) (129/68 - 157/63)  RR: 18 (24 Aug 2021 16:45) (18 - 18)  SpO2: 98% (24 Aug 2021 16:45) (94% - 98%)        REVIEW OF SYSTEMS:  Unable to obtain due to  baseline dementia.      PHYSICAL EXAM:  General: Well developed;  frail elderly male  in no acute distress.  Eyes:  EOMI; conjunctiva and sclera clear  Head: Normocephalic; atraumatic  ENMT: No nasal discharge; airway clear  Neck:  Supple; non tender; no masses.  Respiratory: No wheezes, rales or rhonchi  Cardiovascular: Regular rate and rhythm. S1 and S2 Normal;   Gastrointestinal: Soft non-tender non-distended; Normal bowel sounds  Genitourinary: No  suprapubic  tenderness  Extremities: LLE  with edema and erythema much improved  Vascular: Peripheral pulses palpable 2+ bilaterally  Neurological: Alert and oriented x2, non focal, forgetful   Musculoskeletal: Normal muscle tone and strength   Psychiatric: Cooperative    LABS:                         11.8   10.41 )-----------( 270      ( 24 Aug 2021 08:11 )             36.0     08-24    139  |  110<H>  |  24<H>  ----------------------------<  101<H>  4.1   |  28  |  0.99    Ca    9.8      24 Aug 2021 08:11                          11.8   10.41 )-----------( 270      ( 24 Aug 2021 08:11 )             36.0     24 Aug 2021 08:11    139    |  110    |  24     ----------------------------<  101    4.1     |  28     |  0.99     Ca    9.8        24 Aug 2021 08:11      MEDICATIONS  (STANDING):  amLODIPine   Tablet 5 milliGRAM(s) Oral daily  atorvastatin 40 milliGRAM(s) Oral at bedtime  cefTRIAXone Injectable. 2000 milliGRAM(s) IV Push every 24 hours  enoxaparin Injectable 40 milliGRAM(s) SubCutaneous daily  finasteride 5 milliGRAM(s) Oral daily  polyethylene glycol 3350 17 Gram(s) Oral two times a day  tamsulosin 0.4 milliGRAM(s) Oral at bedtime  timolol 0.5% Solution 1 Drop(s) Both EYES two times a day  valsartan 160 milliGRAM(s) Oral daily    MEDICATIONS  (PRN):  acetaminophen    Suspension .. 650 milliGRAM(s) Oral every 6 hours PRN Temp greater or equal to 38C (100.4F), Moderate Pain (4 - 6)  bisacodyl 5 milliGRAM(s) Oral every 12 hours PRN Constipation  haloperidol    Injectable 0.5 milliGRAM(s) IntraMuscular at bedtime PRN agitation  QUEtiapine 25 milliGRAM(s) Oral at bedtime PRN agitation      RADIOLOGY & ADDITIONAL TESTS:

## 2021-08-24 NOTE — PROGRESS NOTE ADULT - SUBJECTIVE AND OBJECTIVE BOX
HPI:  89 y.o. male with PMHx of mild OLIVER, HTN, HLD, diet controlled DMII, BPH, Glaucoma p/w weakness since this am - progressed through the day to the point was hardly able to move - brought in by daughter and found to have fever 100.4 in ED with no definite source. Pt reported some pain now resolved in right groin and had difficulty to urinate requiring FQ placement.    He was found to have profound bradycardia and chronotropic incompetence on outpatient cardiac monitor and was recommended to have permanent pacemaker implant, but was awaiting resolution of infection and medical optimization.  Per medicine he is cleared from infectious disease standpoint to proceed with pacemaker implant.        8/24/21: s/p dual chamber PPM implant, POD #1.  Pt seen lying in bed in NAD.  He had brief new onset AFib immediately after procedure, was given Amiodarone 150mg IV bolus x 1 and Amio drip started, he then converted to NSR within 30 minutes and it was discontinued.    EKG: A-paced 62 bpm, SR  TELE: A-pacing 60's, sinus rhythm, no return of any atrial fibrillation      MEDICATIONS  (STANDING):  amLODIPine   Tablet 5 milliGRAM(s) Oral daily  atorvastatin 40 milliGRAM(s) Oral at bedtime  cefTRIAXone Injectable. 2000 milliGRAM(s) IV Push every 24 hours  enoxaparin Injectable 40 milliGRAM(s) SubCutaneous daily  finasteride 5 milliGRAM(s) Oral daily  polyethylene glycol 3350 17 Gram(s) Oral two times a day  tamsulosin 0.4 milliGRAM(s) Oral at bedtime  timolol 0.5% Solution 1 Drop(s) Both EYES two times a day  valsartan 160 milliGRAM(s) Oral daily    MEDICATIONS  (PRN):  acetaminophen    Suspension .. 650 milliGRAM(s) Oral every 6 hours PRN Temp greater or equal to 38C (100.4F), Moderate Pain (4 - 6)  bisacodyl 5 milliGRAM(s) Oral every 12 hours PRN Constipation  haloperidol    Injectable 0.5 milliGRAM(s) IntraMuscular at bedtime PRN agitation  QUEtiapine 25 milliGRAM(s) Oral at bedtime PRN agitation      Allergies    penicillins (Other)  tetracyclines (Other)    ROS: denies Chest pain, SOB, palpitations, dizziness or lightheadedness, all other ROS negative. Denies pain at PPM site.    Vital Signs Last 24 Hrs  T(C): 36.4 (24 Aug 2021 08:08), Max: 37.1 (23 Aug 2021 18:46)  T(F): 97.5 (24 Aug 2021 08:08), Max: 98.7 (23 Aug 2021 18:46)  HR: 60 (24 Aug 2021 08:08) (59 - 136)  BP: 157/63 (24 Aug 2021 08:08) (109/59 - 162/68)  BP(mean): --  RR: 18 (24 Aug 2021 08:08) (16 - 18)  SpO2: 94% (24 Aug 2021 08:08) (94% - 100%)      PHYSICAL EXAMINATION:    GENERAL APPEARANCE:  Pt. is not currently dyspneic, in no distress. Pt. is alert, and pleasant, confused at times.  HEENT:  Pupils are normal and react normally. No icterus. Mucous membranes well colored.  NECK:  Supple. No lymphadenopathy. Jugular venous pressure not elevated. Carotids equal.   HEART:   The cardiac impulse has a normal quality. There are no murmurs, rubs or gallops noted  CHEST:  Chest is clear to auscultation. Normal respiratory effort.  ABDOMEN:  Soft and nontender.   EXTREMITIES:  There is no edema.   SKIN:  No rash or significant lesions are noted. Left upper chest incision intact with Prineo dressing, no drainage or hematoma.      LABS:                        11.8   10.41 )-----------( 270      ( 24 Aug 2021 08:11 )             36.0     08-22    138  |  109<H>  |  26<H>  ----------------------------<  121<H>  4.1   |  26  |  1.04    Ca    9.3      22 Aug 2021 08:49            RADIOLOGY & ADDITIONAL TESTS:    < from: Xray Chest 1 View- PORTABLE-Urgent (08.23.21 @ 12:40) >  INTERPRETATION:  History: Dyspnea    Chest:  one view.    Comparison: 08/13/2021 x-ray and CT scan    AP radiograph of the chest demonstrates no evidence of infiltrate, pleural effusion or vascular congestion. No atelectasis is seen. The cardiac silhouette is normal in size. Interval pacemaker placement Osseous structures are intact. Large hiatal hernia.    Impression: No active pulmonary disease.Large hiatal hernia.      < from: TTE Echo Complete w/o Contrast w/ Doppler (08.16.21 @ 15:34) >   Impression     Summary     Fibrocalcific changes noted to the Aortic valve leaflets.   Mild to Moderate aortic regurgitation is present.   Mildly elevated velocities are noted across the aortic valve but this does   not meet the criteria for stenosis.   The left atrium is mildly dilated.   Estimated left ventricular ejection fraction is 55-60 %.   The left ventricle is normal in size, wall thickness, wall motion and   contractility as seen in limited views.   All visualized extra cardiac structures appears to be normal.   Fibrocalcific changes noted to the mitral valve leaflets with preserved   leaflet excursion.   Mild mitral annular calcification is present.   Mild (1+) mitral regurgitation is present.   EA reversal of the mitral inflow consistent with reduced compliance of the   left ventricle.   No evidence of pericardial effusion.   No evidence of pleural effusion.   Pulmonic valve not well seen.   Mild pulmonic valvular regurgitation (1+) is present.   Normal appearing right atrium.   Normal appearing right ventricle structure and function.   Normal appearing tricuspid valve structure.   Mild (1+) tricuspid valve regurgitation is present.

## 2021-08-24 NOTE — CDI QUERY NOTE - NSCDI_DOCCLARIFY_GEN_ALL_CORE_FT
In responding to this request, please exercise your independent professional judgment.  The fact that a question is asked does not imply that any particular answer is desired or expected.

## 2021-08-24 NOTE — PROGRESS NOTE ADULT - PROVIDER SPECIALTY LIST ADULT
Hospitalist
Infectious Disease
Hospitalist
Hospitalist
Electrophysiology
Hospitalist
Infectious Disease
Hospitalist
Hospitalist
Infectious Disease
Hospitalist

## 2021-08-24 NOTE — CDI QUERY NOTE - NSCDIOTHERTXTBX2_GEN_ALL_CORE_FT
Altered Mental Status is a non-specific term. Can you further specify the condition to a more specific diagnosis? And is that the diagnosis that you have made to the underlying cause?     Could you please document  the etiology of the altered mental status such as:     a. Encephalopathy (a reversible condition)        -Type (anoxic, toxic –specify drug, hepatic, metabolic, hypertensive, traumatic, etc.)        -Acuity (acute, subacute, chronic)    b. Acute confusion/delirium  (specify if drug/alcohol induced)    c. Dementia or Alzheimer’s Dementia    d. Stupor/semi-coma      SUPPORTING DOCUMENTATION AND/OR CLINICAL EVIDENCE:    ID documents on 8/19  -encephalopathy    H&P :ROS: unable to obtain secondary to patient medical condition Constitutional: frail [id number] y.o. male with PMHx of mild OLIVER, HTN, HLD, diet controlled DMII, BPH, Glaucoma p/w weakness since this am - progressed through the day to the point was hardly able to move - brought in by daughter and found to have fever 100.4 in ED with no definite source. Impression:  Aspiration PNA, RAZA, Fever, Positive BC    ED note :Principal Discharge Dx Fever. Secondary Discharge Dx Altered mental status    SUPPORTING DOCUMENTATION AND/OR CLINICAL EVIDENCE:    LIVER FUNCTIONS - ( 16 Aug 2021 07:44 )  Alb: 2.2 g/dL / Pro: 5.5 gm/dL / ALK PHOS: 49 U/L / ALT: 106 U/L / AST: 92 U/L / GGT: x           Vital Signs:  T(F): 98.1 (16 Aug 2021 08:18), Max: 100.8 (14 Aug 2021 21:39)  HR: 56 (16 Aug 2021 08:18) (53 - 80)  BP: 133/68 (16 Aug 2021 08:18) (106/44 - 144/54)  RR: 18 (16 Aug 2021 08:18) (16 - 18)  SpO2: 99% (16 Aug 2021 08:18) (92% - 99%)

## 2021-08-24 NOTE — PHYSICAL THERAPY INITIAL EVALUATION ADULT - PERTINENT HX OF CURRENT PROBLEM, REHAB EVAL
89 y.o. male with PMHx of mild Dementia, HTN, HLD, diet controlled DMII, BPH, Glaucoma admitted on 8/13 for evaluation of progressive weakness on day of admission, then fevers; patient mildly coughing, history per medical record as patient is unable to provide history.

## 2021-08-24 NOTE — CDI QUERY NOTE - NSCDINOTEDOCCLARIFICATION_GEN_A_CORE
PLEASE INCLUDE MORE SPECIFIC DOCUMENTATION IN YOUR PROGRESS NOTE AND DISCHARGE SUMMARY.  The documentation in this patient's medical record requires additional clarification to ensure that we accurately capture the patients diagnosis(es), treatment and/or severity of illness. Please document to the greatest level of specificity all corresponding diagnoses (either known or suspected) and/or treatment associated with the clinical information described below.

## 2021-08-25 ENCOUNTER — TRANSCRIPTION ENCOUNTER (OUTPATIENT)
Age: 86
End: 2021-08-25

## 2021-08-25 VITALS
RESPIRATION RATE: 17 BRPM | HEART RATE: 62 BPM | DIASTOLIC BLOOD PRESSURE: 60 MMHG | SYSTOLIC BLOOD PRESSURE: 121 MMHG | OXYGEN SATURATION: 95 % | TEMPERATURE: 98 F

## 2021-08-25 PROCEDURE — 99497 ADVNCD CARE PLAN 30 MIN: CPT

## 2021-08-25 PROCEDURE — 99239 HOSP IP/OBS DSCHRG MGMT >30: CPT

## 2021-08-25 RX ORDER — ATORVASTATIN CALCIUM 80 MG/1
1 TABLET, FILM COATED ORAL
Qty: 0 | Refills: 0 | DISCHARGE

## 2021-08-25 RX ORDER — TAMSULOSIN HYDROCHLORIDE 0.4 MG/1
1 CAPSULE ORAL
Qty: 0 | Refills: 0 | DISCHARGE
Start: 2021-08-25

## 2021-08-25 RX ORDER — QUETIAPINE FUMARATE 200 MG/1
1 TABLET, FILM COATED ORAL
Qty: 0 | Refills: 0 | DISCHARGE
Start: 2021-08-25

## 2021-08-25 RX ORDER — TIMOLOL 0.5 %
1 DROPS OPHTHALMIC (EYE)
Qty: 0 | Refills: 0 | DISCHARGE

## 2021-08-25 RX ORDER — ATORVASTATIN CALCIUM 80 MG/1
1 TABLET, FILM COATED ORAL
Qty: 0 | Refills: 0 | DISCHARGE
Start: 2021-08-25

## 2021-08-25 RX ORDER — CEFUROXIME AXETIL 250 MG
1 TABLET ORAL
Qty: 0 | Refills: 0 | DISCHARGE
Start: 2021-08-25

## 2021-08-25 RX ORDER — TIMOLOL 0.5 %
1 DROPS OPHTHALMIC (EYE)
Qty: 0 | Refills: 0 | DISCHARGE
Start: 2021-08-25

## 2021-08-25 RX ADMIN — VALSARTAN 160 MILLIGRAM(S): 80 TABLET ORAL at 10:25

## 2021-08-25 RX ADMIN — FINASTERIDE 5 MILLIGRAM(S): 5 TABLET, FILM COATED ORAL at 10:25

## 2021-08-25 RX ADMIN — Medication 1 DROP(S): at 10:24

## 2021-08-25 RX ADMIN — POLYETHYLENE GLYCOL 3350 17 GRAM(S): 17 POWDER, FOR SOLUTION ORAL at 10:25

## 2021-08-25 RX ADMIN — ENOXAPARIN SODIUM 40 MILLIGRAM(S): 100 INJECTION SUBCUTANEOUS at 10:24

## 2021-08-25 RX ADMIN — AMLODIPINE BESYLATE 5 MILLIGRAM(S): 2.5 TABLET ORAL at 10:25

## 2021-08-25 RX ADMIN — Medication 500 MILLIGRAM(S): at 10:25

## 2021-08-25 NOTE — DISCHARGE NOTE PROVIDER - CARE PROVIDER_API CALL
Cyrus Perez  INFECTIOUS DISEASE  [address], [address], [place] [address]  Phone: [phone]  Fax: [phone]  Follow Up Time:     Ilia Morrissey)  Cardiac Electrophysiology; Cardiovascular Disease  [address] [place] [address]  Phone: [phone]  Fax: [phone]  Follow Up Time:

## 2021-08-25 NOTE — DISCHARGE NOTE PROVIDER - HOSPITAL COURSE
89 y.o. male with PMHx of mild OLIVER, HTN, HLD, diet controlled DMII, BPH, Glaucoma p/w weakness since this am - progressed through the day to the point was hardly able to move - brought in by daughter and found to have fever 100.4 in ED with no definite source. Pt reported some pain now resolved in right groin and had difficulty to urinate requiring FQ placement.    In the hospital patient was noted to have been bacterimic / aspiration pneumonia /  cellulitis -  received IV antibiotics, now on PO antibiotics. Once the infection got cleared -  patient got PPM. Denies any HA, CP, SOB. Mental status much imrpved    Physical Exam:   GENERAL APPEARANCE:  Since admission much improved, deconditioned, frail  T(C): 36.5 (08-25-21 @ 08:28), Max: 36.7 (08-24-21 @ 23:28)  HR: 67 (08-25-21 @ 08:28) (63 - 87)  BP: 148/72 (08-25-21 @ 08:28) (129/72 - 148/72)  RR: 16 (08-25-21 @ 08:28) (16 - 18)  SpO2: 100% (08-25-21 @ 08:28) (96% - 100%)  HEENT:  Head is normocephalic    Skin:  (R) mild erythema of left medial ankle / PPM site is clean  NECK:  Supple without lymphadenopathy.   HEART:  Regular rate and rhythm. normal S1 and S2, No M/R/G  LUNGS:  Good ins/exp effort, no W/R/R/C  ABDOMEN:  Soft, nontender, nondistended with good bowel sounds heard  EXTREMITIES:  Without cyanosis, clubbing or edema.   NEUROLOGICAL:  Gross nonfocal

## 2021-08-25 NOTE — DISCHARGE NOTE PROVIDER - NSDCMRMEDTOKEN_GEN_ALL_CORE_FT
amLODIPine 5 mg oral tablet: 1 tab(s) orally once a day  atorvastatin 40 mg oral tablet: 1 tab(s) orally once a day  azelastine 137 mcg/inh (0.1%) nasal spray: 2 spray(s) in each nostril 2 times a day, As Needed - for congestion  finasteride 5 mg oral tablet: 1 tab(s) orally once a day  timolol maleate 0.5% ophthalmic solution: 1 drop(s) in each eye 2 times a day  valsartan 160 mg oral tablet: 1 tab(s) orally once a day   amLODIPine 5 mg oral tablet: 1 tab(s) orally once a day  atorvastatin 40 mg oral tablet: 1 tab(s) orally once a day (at bedtime)  azelastine 137 mcg/inh (0.1%) nasal spray: 2 spray(s) in each nostril 2 times a day, As Needed - for congestion  cefuroxime 500 mg oral tablet: 1 tab(s) orally every 12 hours  finasteride 5 mg oral tablet: 1 tab(s) orally once a day  QUEtiapine 25 mg oral tablet: 1 tab(s) orally once a day (at bedtime), As needed, agitation  tamsulosin 0.4 mg oral capsule: 1 cap(s) orally once a day (at bedtime)  timolol maleate 0.5% ophthalmic solution: 1 drop(s) to each affected eye 2 times a day  valsartan 160 mg oral tablet: 1 tab(s) orally once a day

## 2021-08-25 NOTE — DISCHARGE NOTE PROVIDER - NSDCCPCAREPLAN_GEN_ALL_CORE_FT
PRINCIPAL DISCHARGE DIAGNOSIS  Diagnosis: Fever  Assessment and Plan of Treatment: # Strep Dysgalactiae Bacteremia/ Sepsis suspected 2/2 Microaspiration PNA   and/or  LLE cellulitis.  - sepsis present on arrival ( fever, leukocytosis, tachycardia later)   - On IV Cefepime , changed to Ceftriaxone as per BCX sensitivities  - S/p  IV vancomycin x 1 dose   -  Repeat BCX - NGTD  - LLE doppler neg for dvt  - D/w Dr Perez, will c/w PO Ceftin x 5 more days      SECONDARY DISCHARGE DIAGNOSES  Diagnosis: Altered mental status  Assessment and Plan of Treatment: - secondary to toxic encephalopathy    Diagnosis: Leg swelling  Assessment and Plan of Treatment: # LLE edema with erythema, edema   and pain   - suspect cellulitis  - ESR/CRP elevated  - Uric acid low  - XR L ankle with no Fx, no significant fluid   - LLE Doppler neg for DVT   - Improved on Ceftriaxone, change to Po Ceftin now   - Tylenol for pain    Diagnosis: Bradycardia  Assessment and Plan of Treatment: # Bradycardia  - 08/23 PPM placed.  - Post procedure CXR reviewed  - EP cleared for d/c    Diagnosis: Abnormal CT scan  Assessment and Plan of Treatment: - Indeterminate pancreatic lesion in uncinate process measuring 1.5 cm, potentially a side branch IPMN. Contrast enhanced abdominal MRI may be performed non emergently for further characterization as clinically warranted.  - you have been seen by dr. Bailey. Would not aggressively work up as patient demented and 89 yaers old. If panc cyst then nothing to do. If mass lesion, would not recommend surgery or chemo.   Chronic anemia is stable, would not work up.       Diagnosis: RAZA (acute kidney injury)  Assessment and Plan of Treatment: # RAZA/CKD 3  likely prerenal  renal Fx back to baseline after gentle IVF  Encourage Po intake  Monitor UO   Monitor renal Fx    Diagnosis: BPH without urinary obstruction  Assessment and Plan of Treatment:

## 2021-08-25 NOTE — DISCHARGE NOTE NURSING/CASE MANAGEMENT/SOCIAL WORK - PATIENT PORTAL LINK FT
You can access the FollowMyHealth Patient Portal offered by [facility] by registering at the following website: [email or web address]. By joining [employer]’s FollowMyHealth portal, you will also be able to view your health information using other applications (apps) compatible with our system.

## 2021-09-01 DIAGNOSIS — G92 TOXIC ENCEPHALOPATHY: ICD-10-CM

## 2021-09-01 DIAGNOSIS — N40.0 BENIGN PROSTATIC HYPERPLASIA WITHOUT LOWER URINARY TRACT SYMPTOMS: ICD-10-CM

## 2021-09-01 DIAGNOSIS — R50.9 FEVER, UNSPECIFIED: ICD-10-CM

## 2021-09-01 DIAGNOSIS — K86.9 DISEASE OF PANCREAS, UNSPECIFIED: ICD-10-CM

## 2021-09-01 DIAGNOSIS — Z88.1 ALLERGY STATUS TO OTHER ANTIBIOTIC AGENTS STATUS: ICD-10-CM

## 2021-09-01 DIAGNOSIS — G30.9 ALZHEIMER'S DISEASE, UNSPECIFIED: ICD-10-CM

## 2021-09-01 DIAGNOSIS — L03.116 CELLULITIS OF LEFT LOWER LIMB: ICD-10-CM

## 2021-09-01 DIAGNOSIS — J69.0 PNEUMONITIS DUE TO INHALATION OF FOOD AND VOMIT: ICD-10-CM

## 2021-09-01 DIAGNOSIS — F05 DELIRIUM DUE TO KNOWN PHYSIOLOGICAL CONDITION: ICD-10-CM

## 2021-09-01 DIAGNOSIS — I48.91 UNSPECIFIED ATRIAL FIBRILLATION: ICD-10-CM

## 2021-09-01 DIAGNOSIS — Z16.19 RESISTANCE TO OTHER SPECIFIED BETA LACTAM ANTIBIOTICS: ICD-10-CM

## 2021-09-01 DIAGNOSIS — Z78.1 PHYSICAL RESTRAINT STATUS: ICD-10-CM

## 2021-09-01 DIAGNOSIS — I13.10 HYPERTENSIVE HEART AND CHRONIC KIDNEY DISEASE WITHOUT HEART FAILURE, WITH STAGE 1 THROUGH STAGE 4 CHRONIC KIDNEY DISEASE, OR UNSPECIFIED CHRONIC KIDNEY DISEASE: ICD-10-CM

## 2021-09-01 DIAGNOSIS — R33.9 RETENTION OF URINE, UNSPECIFIED: ICD-10-CM

## 2021-09-01 DIAGNOSIS — K59.00 CONSTIPATION, UNSPECIFIED: ICD-10-CM

## 2021-09-01 DIAGNOSIS — A40.8 OTHER STREPTOCOCCAL SEPSIS: ICD-10-CM

## 2021-09-01 DIAGNOSIS — J98.11 ATELECTASIS: ICD-10-CM

## 2021-09-01 DIAGNOSIS — I49.8 OTHER SPECIFIED CARDIAC ARRHYTHMIAS: ICD-10-CM

## 2021-09-01 DIAGNOSIS — E11.22 TYPE 2 DIABETES MELLITUS WITH DIABETIC CHRONIC KIDNEY DISEASE: ICD-10-CM

## 2021-09-01 DIAGNOSIS — N18.30 CHRONIC KIDNEY DISEASE, STAGE 3 UNSPECIFIED: ICD-10-CM

## 2021-09-01 DIAGNOSIS — I35.1 NONRHEUMATIC AORTIC (VALVE) INSUFFICIENCY: ICD-10-CM

## 2021-09-01 DIAGNOSIS — Z66 DO NOT RESUSCITATE: ICD-10-CM

## 2021-09-01 DIAGNOSIS — F02.81 DEMENTIA IN OTHER DISEASES CLASSIFIED ELSEWHERE, UNSPECIFIED SEVERITY, WITH BEHAVIORAL DISTURBANCE: ICD-10-CM

## 2021-09-01 DIAGNOSIS — E78.5 HYPERLIPIDEMIA, UNSPECIFIED: ICD-10-CM

## 2021-09-01 DIAGNOSIS — R00.1 BRADYCARDIA, UNSPECIFIED: ICD-10-CM

## 2021-09-01 DIAGNOSIS — R45.1 RESTLESSNESS AND AGITATION: ICD-10-CM

## 2021-09-01 DIAGNOSIS — H40.9 UNSPECIFIED GLAUCOMA: ICD-10-CM

## 2021-09-01 DIAGNOSIS — N17.9 ACUTE KIDNEY FAILURE, UNSPECIFIED: ICD-10-CM

## 2021-09-01 DIAGNOSIS — K44.9 DIAPHRAGMATIC HERNIA WITHOUT OBSTRUCTION OR GANGRENE: ICD-10-CM

## 2021-09-01 DIAGNOSIS — Z88.0 ALLERGY STATUS TO PENICILLIN: ICD-10-CM

## 2021-09-01 DIAGNOSIS — J15.4 PNEUMONIA DUE TO OTHER STREPTOCOCCI: ICD-10-CM

## 2021-09-01 DIAGNOSIS — D63.8 ANEMIA IN OTHER CHRONIC DISEASES CLASSIFIED ELSEWHERE: ICD-10-CM

## 2021-09-02 DIAGNOSIS — I49.5 SICK SINUS SYNDROME: ICD-10-CM

## 2021-09-07 ENCOUNTER — APPOINTMENT (OUTPATIENT)
Dept: ELECTROPHYSIOLOGY | Facility: CLINIC | Age: 86
End: 2021-09-07

## 2021-12-19 RX ORDER — NEOMYCIN/POLYMYXIN B/DEXAMETHA 0.1 %
1 SUSPENSION, DROPS(FINAL DOSAGE FORM)(ML) OPHTHALMIC (EYE)
Qty: 0 | Refills: 0 | DISCHARGE
Start: 2021-12-19 | End: 2022-01-01

## 2021-12-21 ENCOUNTER — INPATIENT (INPATIENT)
Facility: HOSPITAL | Age: 86
LOS: 9 days | Discharge: NURSING, REHAB OR LONG-TERM CARE | DRG: 177 | End: 2021-12-31
Attending: FAMILY MEDICINE | Admitting: INTERNAL MEDICINE
Payer: MEDICARE

## 2021-12-21 ENCOUNTER — APPOINTMENT (OUTPATIENT)
Dept: DERMATOLOGY | Facility: CLINIC | Age: 86
End: 2021-12-21

## 2021-12-21 VITALS — WEIGHT: 149.91 LBS | HEIGHT: 64 IN

## 2021-12-21 DIAGNOSIS — Z98.890 OTHER SPECIFIED POSTPROCEDURAL STATES: Chronic | ICD-10-CM

## 2021-12-21 LAB
ALBUMIN SERPL ELPH-MCNC: 3 G/DL — LOW (ref 3.3–5)
ALP SERPL-CCNC: 77 U/L — SIGNIFICANT CHANGE UP (ref 40–120)
ALT FLD-CCNC: 41 U/L — SIGNIFICANT CHANGE UP (ref 12–78)
ANION GAP SERPL CALC-SCNC: 1 MMOL/L — LOW (ref 5–17)
AST SERPL-CCNC: 35 U/L — SIGNIFICANT CHANGE UP (ref 15–37)
BASOPHILS # BLD AUTO: 0.01 K/UL — SIGNIFICANT CHANGE UP (ref 0–0.2)
BASOPHILS NFR BLD AUTO: 0.1 % — SIGNIFICANT CHANGE UP (ref 0–2)
BILIRUB SERPL-MCNC: 0.3 MG/DL — SIGNIFICANT CHANGE UP (ref 0.2–1.2)
BUN SERPL-MCNC: 38 MG/DL — HIGH (ref 7–23)
CALCIUM SERPL-MCNC: 9.3 MG/DL — SIGNIFICANT CHANGE UP (ref 8.5–10.1)
CHLORIDE SERPL-SCNC: 106 MMOL/L — SIGNIFICANT CHANGE UP (ref 96–108)
CO2 SERPL-SCNC: 28 MMOL/L — SIGNIFICANT CHANGE UP (ref 22–31)
CREAT SERPL-MCNC: 1.52 MG/DL — HIGH (ref 0.5–1.3)
D DIMER BLD IA.RAPID-MCNC: 430 NG/ML DDU — HIGH
EOSINOPHIL # BLD AUTO: 0 K/UL — SIGNIFICANT CHANGE UP (ref 0–0.5)
EOSINOPHIL NFR BLD AUTO: 0 % — SIGNIFICANT CHANGE UP (ref 0–6)
GLUCOSE SERPL-MCNC: 105 MG/DL — HIGH (ref 70–99)
HCT VFR BLD CALC: 39.9 % — SIGNIFICANT CHANGE UP (ref 39–50)
HGB BLD-MCNC: 13 G/DL — SIGNIFICANT CHANGE UP (ref 13–17)
IMM GRANULOCYTES NFR BLD AUTO: 0.1 % — SIGNIFICANT CHANGE UP (ref 0–1.5)
LYMPHOCYTES # BLD AUTO: 1.02 K/UL — SIGNIFICANT CHANGE UP (ref 1–3.3)
LYMPHOCYTES # BLD AUTO: 14.8 % — SIGNIFICANT CHANGE UP (ref 13–44)
MCHC RBC-ENTMCNC: 32.5 PG — SIGNIFICANT CHANGE UP (ref 27–34)
MCHC RBC-ENTMCNC: 32.6 GM/DL — SIGNIFICANT CHANGE UP (ref 32–36)
MCV RBC AUTO: 99.8 FL — SIGNIFICANT CHANGE UP (ref 80–100)
MONOCYTES # BLD AUTO: 1.31 K/UL — HIGH (ref 0–0.9)
MONOCYTES NFR BLD AUTO: 19 % — HIGH (ref 2–14)
NEUTROPHILS # BLD AUTO: 4.55 K/UL — SIGNIFICANT CHANGE UP (ref 1.8–7.4)
NEUTROPHILS NFR BLD AUTO: 66 % — SIGNIFICANT CHANGE UP (ref 43–77)
PLATELET # BLD AUTO: 138 K/UL — LOW (ref 150–400)
POTASSIUM SERPL-MCNC: 4 MMOL/L — SIGNIFICANT CHANGE UP (ref 3.5–5.3)
POTASSIUM SERPL-SCNC: 4 MMOL/L — SIGNIFICANT CHANGE UP (ref 3.5–5.3)
PROT SERPL-MCNC: 6.7 GM/DL — SIGNIFICANT CHANGE UP (ref 6–8.3)
RBC # BLD: 4 M/UL — LOW (ref 4.2–5.8)
RBC # FLD: 13.8 % — SIGNIFICANT CHANGE UP (ref 10.3–14.5)
SODIUM SERPL-SCNC: 135 MMOL/L — SIGNIFICANT CHANGE UP (ref 135–145)
WBC # BLD: 6.9 K/UL — SIGNIFICANT CHANGE UP (ref 3.8–10.5)
WBC # FLD AUTO: 6.9 K/UL — SIGNIFICANT CHANGE UP (ref 3.8–10.5)

## 2021-12-21 PROCEDURE — 93010 ELECTROCARDIOGRAM REPORT: CPT

## 2021-12-21 PROCEDURE — 99285 EMERGENCY DEPT VISIT HI MDM: CPT | Mod: CS

## 2021-12-21 PROCEDURE — 71045 X-RAY EXAM CHEST 1 VIEW: CPT | Mod: 26

## 2021-12-21 RX ORDER — SODIUM CHLORIDE 9 MG/ML
1000 INJECTION INTRAMUSCULAR; INTRAVENOUS; SUBCUTANEOUS ONCE
Refills: 0 | Status: COMPLETED | OUTPATIENT
Start: 2021-12-21 | End: 2021-12-21

## 2021-12-21 NOTE — ED ADULT NURSE NOTE - CHIEF COMPLAINT QUOTE
pt presents to ED from [facility] with complaints of pneumonia in setting of COVID-19 diagnosis on rapid test performed at facility today. pt had chest x ray performed at facility and was diagnosed with pneumonia. no other complaints.

## 2021-12-21 NOTE — ED ADULT NURSE NOTE - OBJECTIVE STATEMENT
Pt presents to ER COVID positive from nursing home. Pt tested positive for covid today after rapid test. Pt received CXR at facility that was suspicious for double pneumonia. AO x 3 oriented to baseline, normal breathing pattern with no difficulty.

## 2021-12-21 NOTE — ED PROVIDER NOTE - CARE PLAN
1 Principal Discharge DX:	COVID-19  Secondary Diagnosis:	Lethargy  Secondary Diagnosis:	RAZA (acute kidney injury)  Secondary Diagnosis:	Dehydration

## 2021-12-21 NOTE — ED PROVIDER NOTE - CPE EDP PSYCH NORM
normal... Plan: Patient finished month #6 at 30 mg once daily. Plan to continue on to month #7 at 30 mg daily. \\n\\n\\nDue to severe muscle and joint aches along with xerosis, we have lowered the patients dosage so that the medication is more tolerable. Informed patient we will do a longer course at a lower dosage.\\nThis month patients symptoms have improved and been tolerable with xerosis and myalgia.\\n\\nDiscussed that once patient completes Accutane course, if she still has hormonal breakouts we may need to transition her to Spironolactone.\\n\\nShe had a few breakouts this month, which she thinks may have been around her cycle since she had spotting around the same time, but she has an IUD. Reviewing her history and photos, she may also have aziza oral dermatitis which may be why she is doing much better with lower dose Accutane. Goal will be to get her controlled with full month without breakouts, and then possibly taper her dose of Accutane instead of discontinuing. Will observe over the next few months.\\n\\nLab order sent to patient. Detail Level: Zone

## 2021-12-21 NOTE — ED ADULT TRIAGE NOTE - CHIEF COMPLAINT QUOTE
pt presents to ED from [facility] with complaints of pneumonia in setting of COVID-19 diagnosis on rapid test performed at facility today. pt had chest x ray performed at facility and was diagnosed with pneumonia. no other complaints.

## 2021-12-21 NOTE — ED PROVIDER NOTE - OBJECTIVE STATEMENT
pt has been at [facility] after admission for PNA, pw lethargy, weakness, poor PO intake in setting of testing positive for COVID.  + runny nose noted by daughter 3d ago.  pt is double vaccinated.

## 2021-12-22 DIAGNOSIS — U07.1 COVID-19: ICD-10-CM

## 2021-12-22 LAB
ALBUMIN SERPL ELPH-MCNC: 2.8 G/DL — LOW (ref 3.3–5)
ALP SERPL-CCNC: 70 U/L — SIGNIFICANT CHANGE UP (ref 40–120)
ALT FLD-CCNC: 39 U/L — SIGNIFICANT CHANGE UP (ref 12–78)
ANION GAP SERPL CALC-SCNC: 7 MMOL/L — SIGNIFICANT CHANGE UP (ref 5–17)
AST SERPL-CCNC: 34 U/L — SIGNIFICANT CHANGE UP (ref 15–37)
BASOPHILS # BLD AUTO: 0 K/UL — SIGNIFICANT CHANGE UP (ref 0–0.2)
BASOPHILS NFR BLD AUTO: 0 % — SIGNIFICANT CHANGE UP (ref 0–2)
BILIRUB SERPL-MCNC: 0.4 MG/DL — SIGNIFICANT CHANGE UP (ref 0.2–1.2)
BUN SERPL-MCNC: 32 MG/DL — HIGH (ref 7–23)
CALCIUM SERPL-MCNC: 8.7 MG/DL — SIGNIFICANT CHANGE UP (ref 8.5–10.1)
CHLORIDE SERPL-SCNC: 109 MMOL/L — HIGH (ref 96–108)
CO2 SERPL-SCNC: 24 MMOL/L — SIGNIFICANT CHANGE UP (ref 22–31)
CREAT SERPL-MCNC: 1.28 MG/DL — SIGNIFICANT CHANGE UP (ref 0.5–1.3)
CRP SERPL-MCNC: 12 MG/L — HIGH
EOSINOPHIL # BLD AUTO: 0 K/UL — SIGNIFICANT CHANGE UP (ref 0–0.5)
EOSINOPHIL NFR BLD AUTO: 0 % — SIGNIFICANT CHANGE UP (ref 0–6)
FERRITIN SERPL-MCNC: 67 NG/ML — SIGNIFICANT CHANGE UP (ref 30–400)
GLUCOSE SERPL-MCNC: 101 MG/DL — HIGH (ref 70–99)
HCT VFR BLD CALC: 39.9 % — SIGNIFICANT CHANGE UP (ref 39–50)
HGB BLD-MCNC: 13.1 G/DL — SIGNIFICANT CHANGE UP (ref 13–17)
IMM GRANULOCYTES NFR BLD AUTO: 0.3 % — SIGNIFICANT CHANGE UP (ref 0–1.5)
LYMPHOCYTES # BLD AUTO: 1.01 K/UL — SIGNIFICANT CHANGE UP (ref 1–3.3)
LYMPHOCYTES # BLD AUTO: 13.3 % — SIGNIFICANT CHANGE UP (ref 13–44)
MCHC RBC-ENTMCNC: 32.6 PG — SIGNIFICANT CHANGE UP (ref 27–34)
MCHC RBC-ENTMCNC: 32.8 GM/DL — SIGNIFICANT CHANGE UP (ref 32–36)
MCV RBC AUTO: 99.3 FL — SIGNIFICANT CHANGE UP (ref 80–100)
MONOCYTES # BLD AUTO: 1.18 K/UL — HIGH (ref 0–0.9)
MONOCYTES NFR BLD AUTO: 15.6 % — HIGH (ref 2–14)
NEUTROPHILS # BLD AUTO: 5.36 K/UL — SIGNIFICANT CHANGE UP (ref 1.8–7.4)
NEUTROPHILS NFR BLD AUTO: 70.8 % — SIGNIFICANT CHANGE UP (ref 43–77)
PLATELET # BLD AUTO: 129 K/UL — LOW (ref 150–400)
POTASSIUM SERPL-MCNC: 3.7 MMOL/L — SIGNIFICANT CHANGE UP (ref 3.5–5.3)
POTASSIUM SERPL-SCNC: 3.7 MMOL/L — SIGNIFICANT CHANGE UP (ref 3.5–5.3)
PROCALCITONIN SERPL-MCNC: 0.36 NG/ML — HIGH (ref 0.02–0.1)
PROT SERPL-MCNC: 6.5 GM/DL — SIGNIFICANT CHANGE UP (ref 6–8.3)
RBC # BLD: 4.02 M/UL — LOW (ref 4.2–5.8)
RBC # FLD: 13.6 % — SIGNIFICANT CHANGE UP (ref 10.3–14.5)
SARS-COV-2 RNA SPEC QL NAA+PROBE: DETECTED
SODIUM SERPL-SCNC: 140 MMOL/L — SIGNIFICANT CHANGE UP (ref 135–145)
WBC # BLD: 7.57 K/UL — SIGNIFICANT CHANGE UP (ref 3.8–10.5)
WBC # FLD AUTO: 7.57 K/UL — SIGNIFICANT CHANGE UP (ref 3.8–10.5)

## 2021-12-22 PROCEDURE — 99221 1ST HOSP IP/OBS SF/LOW 40: CPT

## 2021-12-22 PROCEDURE — 72170 X-RAY EXAM OF PELVIS: CPT

## 2021-12-22 PROCEDURE — 72170 X-RAY EXAM OF PELVIS: CPT | Mod: 26

## 2021-12-22 PROCEDURE — 97116 GAIT TRAINING THERAPY: CPT | Mod: GP

## 2021-12-22 PROCEDURE — 85027 COMPLETE CBC AUTOMATED: CPT

## 2021-12-22 PROCEDURE — 97163 PT EVAL HIGH COMPLEX 45 MIN: CPT | Mod: GP

## 2021-12-22 PROCEDURE — 70450 CT HEAD/BRAIN W/O DYE: CPT

## 2021-12-22 PROCEDURE — 36415 COLL VENOUS BLD VENIPUNCTURE: CPT

## 2021-12-22 PROCEDURE — 80048 BASIC METABOLIC PNL TOTAL CA: CPT

## 2021-12-22 PROCEDURE — 97530 THERAPEUTIC ACTIVITIES: CPT | Mod: GP

## 2021-12-22 PROCEDURE — U0005: CPT

## 2021-12-22 PROCEDURE — 85025 COMPLETE CBC W/AUTO DIFF WBC: CPT

## 2021-12-22 PROCEDURE — 12345: CPT | Mod: NC,GC

## 2021-12-22 PROCEDURE — 70450 CT HEAD/BRAIN W/O DYE: CPT | Mod: 26

## 2021-12-22 PROCEDURE — 80053 COMPREHEN METABOLIC PANEL: CPT

## 2021-12-22 PROCEDURE — U0003: CPT

## 2021-12-22 RX ORDER — HEPARIN SODIUM 5000 [USP'U]/ML
5000 INJECTION INTRAVENOUS; SUBCUTANEOUS EVERY 12 HOURS
Refills: 0 | Status: DISCONTINUED | OUTPATIENT
Start: 2021-12-22 | End: 2021-12-31

## 2021-12-22 RX ORDER — AZELASTINE 137 UG/1
2 SPRAY, METERED NASAL
Qty: 0 | Refills: 0 | DISCHARGE

## 2021-12-22 RX ORDER — TAMSULOSIN HYDROCHLORIDE 0.4 MG/1
0.4 CAPSULE ORAL AT BEDTIME
Refills: 0 | Status: DISCONTINUED | OUTPATIENT
Start: 2021-12-22 | End: 2021-12-31

## 2021-12-22 RX ORDER — QUETIAPINE FUMARATE 200 MG/1
25 TABLET, FILM COATED ORAL AT BEDTIME
Refills: 0 | Status: DISCONTINUED | OUTPATIENT
Start: 2021-12-22 | End: 2021-12-31

## 2021-12-22 RX ORDER — AMLODIPINE BESYLATE 2.5 MG/1
5 TABLET ORAL DAILY
Refills: 0 | Status: DISCONTINUED | OUTPATIENT
Start: 2021-12-22 | End: 2021-12-31

## 2021-12-22 RX ORDER — TIMOLOL 0.5 %
1 DROPS OPHTHALMIC (EYE)
Refills: 0 | Status: DISCONTINUED | OUTPATIENT
Start: 2021-12-22 | End: 2021-12-31

## 2021-12-22 RX ORDER — VALSARTAN 80 MG/1
160 TABLET ORAL DAILY
Refills: 0 | Status: DISCONTINUED | OUTPATIENT
Start: 2021-12-22 | End: 2021-12-22

## 2021-12-22 RX ORDER — SODIUM CHLORIDE 9 MG/ML
1000 INJECTION INTRAMUSCULAR; INTRAVENOUS; SUBCUTANEOUS
Refills: 0 | Status: DISCONTINUED | OUTPATIENT
Start: 2021-12-22 | End: 2021-12-22

## 2021-12-22 RX ORDER — ONDANSETRON 8 MG/1
4 TABLET, FILM COATED ORAL EVERY 8 HOURS
Refills: 0 | Status: DISCONTINUED | OUTPATIENT
Start: 2021-12-22 | End: 2021-12-31

## 2021-12-22 RX ORDER — ACETAMINOPHEN 500 MG
650 TABLET ORAL EVERY 6 HOURS
Refills: 0 | Status: DISCONTINUED | OUTPATIENT
Start: 2021-12-22 | End: 2021-12-31

## 2021-12-22 RX ORDER — ATORVASTATIN CALCIUM 80 MG/1
40 TABLET, FILM COATED ORAL AT BEDTIME
Refills: 0 | Status: DISCONTINUED | OUTPATIENT
Start: 2021-12-22 | End: 2021-12-31

## 2021-12-22 RX ORDER — FINASTERIDE 5 MG/1
5 TABLET, FILM COATED ORAL DAILY
Refills: 0 | Status: DISCONTINUED | OUTPATIENT
Start: 2021-12-22 | End: 2021-12-31

## 2021-12-22 RX ADMIN — HEPARIN SODIUM 5000 UNIT(S): 5000 INJECTION INTRAVENOUS; SUBCUTANEOUS at 11:05

## 2021-12-22 RX ADMIN — AMLODIPINE BESYLATE 5 MILLIGRAM(S): 2.5 TABLET ORAL at 11:05

## 2021-12-22 RX ADMIN — FINASTERIDE 5 MILLIGRAM(S): 5 TABLET, FILM COATED ORAL at 11:05

## 2021-12-22 RX ADMIN — QUETIAPINE FUMARATE 25 MILLIGRAM(S): 200 TABLET, FILM COATED ORAL at 21:12

## 2021-12-22 RX ADMIN — ATORVASTATIN CALCIUM 40 MILLIGRAM(S): 80 TABLET, FILM COATED ORAL at 21:12

## 2021-12-22 RX ADMIN — HEPARIN SODIUM 5000 UNIT(S): 5000 INJECTION INTRAVENOUS; SUBCUTANEOUS at 21:12

## 2021-12-22 RX ADMIN — TAMSULOSIN HYDROCHLORIDE 0.4 MILLIGRAM(S): 0.4 CAPSULE ORAL at 21:12

## 2021-12-22 RX ADMIN — Medication 1 DROP(S): at 21:12

## 2021-12-22 RX ADMIN — SODIUM CHLORIDE 1000 MILLILITER(S): 9 INJECTION INTRAMUSCULAR; INTRAVENOUS; SUBCUTANEOUS at 01:38

## 2021-12-22 NOTE — PHYSICAL THERAPY INITIAL EVALUATION ADULT - GENERAL OBSERVATIONS, REHAB EVAL
Pt rec'd supine in bed in NAD with Posey Restraint Vest Intact; Pt denied any pain or discomfort; SpO2 96% on RA at rest

## 2021-12-22 NOTE — PATIENT PROFILE ADULT - TYPE OF COMMUNICATION USED TO ADDRESS HEALTHCARE
as per pt's dtr Karen has hearing devices which he left at the nursing home, prefers staff to speak a little  louder to patient/Other

## 2021-12-22 NOTE — PATIENT PROFILE ADULT - FALL HARM RISK - HARM RISK INTERVENTIONS
Assistance with ambulation/Assistance OOB with selected safe patient handling equipment/Communicate Risk of Fall with Harm to all staff/Discuss with provider need for PT consult/Monitor for mental status changes/Monitor gait and stability/Move patient closer to nurses' station/Provide patient with walking aids - walker, cane, crutches/Reinforce activity limits and safety measures with patient and family/Reorient to person, place and time as needed/Tailored Fall Risk Interventions/Toileting schedule using arm’s reach rule for commode and bathroom/Use of alarms - bed, chair and/or voice tab/Visual Cue: Yellow wristband and red socks/Bed in lowest position, wheels locked, appropriate side rails in place/Call bell, personal items and telephone in reach/Instruct patient to call for assistance before getting out of bed or chair/Non-slip footwear when patient is out of bed/[place] to call system/Physically safe environment - no spills, clutter or unnecessary equipment/Purposeful Proactive Rounding/Room/bathroom lighting operational, light cord in reach

## 2021-12-22 NOTE — H&P ADULT - NSHPADDITIONALINFOADULT_GEN_ALL_CORE
Patient seen and examined after initial evaluation above by family medicine resident. Case discussed and reviewed in detail. Please note my plan below.    89 y/o M Alzheimer's, bradycardia, s/p PPM placement, HTN, dyslipidemia, DM2, glaucoma, BPH, p/w COVID19 infection      *Metabolic encephaloathy 2/2 COVID19 infection  -Pulse ox WNL   -Isolation precautions  -Elevated D-dimer -> V/Q scan  -PT consult     *Suspect RAZA  -S/p NS 1L in ED, recheck creatinine in AM to check for improvement  -Avoid nephorotoxic agents   -Hold ARB   -UA  -I/Os    *Questionable fall in the ED?  -CTH / BL Hip xrays     *DM2  -Humalog ISS  -Diabetic diet     *H/o alzheimer's / HTN / dyslipidemia / glaucoma / BPH   -C/w home meds and f/u outpatient for further management if conditions remain stable during hospitalization     *DVT ppx  -Heparin SubQ

## 2021-12-22 NOTE — H&P ADULT - NSHPPHYSICALEXAM_GEN_ALL_CORE
Vital Signs Last 24 Hrs  T(C): 37.3 (21 Dec 2021 22:00), Max: 38.2 (21 Dec 2021 19:25)  T(F): 99.2 (21 Dec 2021 22:00), Max: 100.8 (21 Dec 2021 19:25)  HR: 66 (21 Dec 2021 22:00) (60 - 66)  BP: 153/59 (21 Dec 2021 22:00) (153/59 - 163/61)  BP(mean): 91 (21 Dec 2021 19:25) (91 - 91)  RR: 17 (21 Dec 2021 22:00) (16 - 17)  SpO2: 98% (21 Dec 2021 22:00) (98% - 99%)    PHYSICAL EXAM:  GENERAL: NAD  HEAD:  Atraumatic, Normocephalic  EYES: conjunctiva and sclera clear  ENT: Moist mucous membranes  NECK: Supple, No JVD  CHEST/LUNG: Clear to auscultation bilaterally; No rales, rhonchi, wheezing, or rubs. Unlabored respirations  HEART: Regular rate and rhythm; No murmurs  ABDOMEN: Bowel sounds present; Soft, Nontender, Nondistended.   EXTREMITIES:  2+ Peripheral Pulses, brisk capillary refill. No clubbing, cyanosis, or edema  NERVOUS SYSTEM:  Alert & Oriented X3, speech clear. No deficits   MSK: FROM all 4 extremities, decreased strength to b/l LE Vital Signs Last 24 Hrs  T(C): 37.3 (21 Dec 2021 22:00), Max: 38.2 (21 Dec 2021 19:25)  T(F): 99.2 (21 Dec 2021 22:00), Max: 100.8 (21 Dec 2021 19:25)  HR: 66 (21 Dec 2021 22:00) (60 - 66)  BP: 153/59 (21 Dec 2021 22:00) (153/59 - 163/61)  BP(mean): 91 (21 Dec 2021 19:25) (91 - 91)  RR: 17 (21 Dec 2021 22:00) (16 - 17)  SpO2: 98% (21 Dec 2021 22:00) (98% - 99%)    PHYSICAL EXAM:  GENERAL: NAD  HEAD:  Atraumatic, Normocephalic  EYES: conjunctiva and sclera clear  ENT: Moist mucous membranes  NECK: Supple, No JVD  CHEST/LUNG: Clear to auscultation bilaterally; No rales, rhonchi, wheezing, or rubs. Unlabored respirations  HEART: Regular rate and rhythm; No murmurs  ABDOMEN: Bowel sounds present; Soft, Nontender, Nondistended.   EXTREMITIES:  2+ Peripheral Pulses, brisk capillary refill. No clubbing, cyanosis, or edema  NERVOUS SYSTEM:  Alert & awake, confused, follows commands    MSK: FROM all 4 extremities, decreased strength to b/l LE

## 2021-12-22 NOTE — PATIENT PROFILE ADULT - LANGUAGE ASSISTANCE NEEDED
Pt is a poor historian called and spoke to pt's dtr Karen (HCP) all intake information obtained./Yes-Patient/Caregiver accepts free interpretation services...

## 2021-12-22 NOTE — H&P ADULT - HISTORY OF PRESENT ILLNESS
89 y/o male with PMHx significant for Alzheimer's, bradycardia s/p PPM placement, HTN, HLD, diet controlled diabetes mellitus type 2, Glaucoma, BPH presented today from [facility] with a one day history of generalized weakness, lethargy, malaise, decreased oral intake and runny nose x 3 days. Patient's rapid covid-19 test was positive. Patient is vaccinated against covid-19 (x2).     Patient found on floor of ER room on my arrival for examination for admission, noted awake, alert, trying to get up off of the floor, no apparent injuries, at baseline, pleasantly confused as per RN and PCA at bedside.

## 2021-12-22 NOTE — PHYSICAL THERAPY INITIAL EVALUATION ADULT - MODALITIES TREATMENT COMMENTS
Pt left as rec'd supine in bed at SCOTTY Miranda's request; Wake Vest Restraint intact; CBIR; Pt instructed to not get up alone; Bed alarm activated; SCOTTY Miranda made aware

## 2021-12-22 NOTE — H&P ADULT - ASSESSMENT
89 y/o male with PMHx significant for Alzheimer's, bradycardia s/p PPM placement, HTN, HLD, diet controlled diabetes mellitus type 2, Glaucoma, BPH presented today from [facility] with a one day history of generalized weakness, lethargy, malaise, decreased oral intake and runny nose x 3 days. Patient's rapid covid-19 test was positive. Patient is vaccinated against covid-19 (x2).     #Covid-19 infection   -Rapid covid-19 test positive  -F/u covid-19 PCR  -Currently saturating well on room air, no respiratory symptoms   -Inflammatory markers: D-dimer: 430, F/u ferritin, CRP, procalcitonin   -Supplemental O2 if needed to maintain Sp02> 94%    #RAZA   #Dehydration   -Baseline Cr 0.9-1  -BUN/Creatinine 38/1.5   -S/p NS 1L bolus in ER  -Continue gentle hydration     #HTN  -Continue amlodipine 5mg daily, Valsartan 160mg daily  -DASH/TLC  -Monitor BP & titrate BP meds PRN    #BPH  -Continue finasteride 5md daily, tamsulosin 0.4mg qhs     #HLD  -Continue Lipitor 40mg daily           89 y/o male with PMHx significant for Alzheimer's, bradycardia s/p PPM placement, HTN, HLD, diet controlled diabetes mellitus type 2, Glaucoma, BPH presented today from [facility] with a one day history of generalized weakness, lethargy, malaise, decreased oral intake and runny nose x 3 days. Patient's rapid covid-19 test was positive. Patient is vaccinated against covid-19 (x2).     #Covid-19 infection   -Rapid covid-19 test positive  -F/u covid-19 PCR  -Currently saturating well on room air, no respiratory symptoms   -Inflammatory markers: D-dimer: 430, F/u ferritin, CRP, procalcitonin   -Supplemental O2 if needed to maintain Sp02> 94%    #RAZA   #Dehydration   -Baseline Cr 0.9-1  -BUN/Creatinine 38/1.5   -S/p NS 1L bolus in ER  -Continue gentle hydration     #HTN  -Continue amlodipine 5mg daily, Valsartan 160mg daily  -DASH/TLC  -Monitor BP & titrate BP meds PRN    #BPH  -Continue finasteride 5md daily, tamsulosin 0.4mg qhs     #HLD  -Continue Lipitor 40mg daily     #Alzheimer's disease  -Seroquel qhs PRN    #Glaucoma  -Timolol eye drops     #VTE prophylaxis  IMPROVE VTE Individual Risk Assessment    RISK                                                                Points    [  ] Previous VTE                                                  3    [  ] Thrombophilia                                               2    [  ] Lower limb paralysis                                      2        (unable to hold up >15 seconds)      [  ] Current Cancer                                              2         (within 6 months)    [  ] Immobilization > 24 hrs                                1    [  ] ICU/CCU stay > 24 hours                              1    [ x ] Age > 60                                                      1    IMPROVE VTE Score 1  -Covid-19 +  -Heparin 5000u subQ q12hrs     #Advanced directives  -DNR/DNI    Above discussed with Dr. Monte  89 y/o male with PMHx significant for Alzheimer's, bradycardia s/p PPM placement, HTN, HLD, diet controlled diabetes mellitus type 2, Glaucoma, BPH presented today from [facility] with a one day history of generalized weakness, lethargy, malaise, decreased oral intake and runny nose x 3 days. Patient's rapid covid-19 test was positive. Patient is vaccinated against covid-19 (x2).     #Covid-19 infection   -Rapid covid-19 test positive  -F/u covid-19 PCR  -Currently saturating well on room air, no respiratory symptoms   -Inflammatory markers: D-dimer: 430, F/u ferritin, CRP, procalcitonin   -Supplemental O2 if needed to maintain Sp02> 94%    #RAZA   #Dehydration   -Baseline Cr 0.9-1  -BUN/Creatinine 38/1.5   -S/p NS 1L bolus in ER  -Avoid nephrotoxins     #Questionable fall in ER   -Patient was found on the floor in ER   -CT head, pelvic X-rays ordered  -Patient able to move all extremities at this time   -Will continue to monitor closely     #Elevated D-dimer  -V/Q scan ordered     #HTN  -Continue amlodipine 5mg daily,   -Hold Valsartan for now 2/2 RAZA   -DASH/TLC  -Monitor BP & titrate BP meds PRN    #BPH  -Continue finasteride 5md daily, tamsulosin 0.4mg qhs     #HLD  -Continue Lipitor 40mg daily     #Alzheimer's disease  -Seroquel qhs PRN    #Glaucoma  -Timolol eye drops     #VTE prophylaxis  IMPROVE VTE Individual Risk Assessment    RISK                                                                Points    [  ] Previous VTE                                                  3    [  ] Thrombophilia                                               2    [  ] Lower limb paralysis                                      2        (unable to hold up >15 seconds)      [  ] Current Cancer                                              2         (within 6 months)    [  ] Immobilization > 24 hrs                                1    [  ] ICU/CCU stay > 24 hours                              1    [ x ] Age > 60                                                   1    IMPROVE VTE Score 1  -Covid-19 +  -Heparin 5000u subQ q12hrs     #Advanced directives  -DNR/DNI  -Spoke with patient's daughter Karen Anderson, confirms MOLST orders for DNR/DNI, states that form was brought in with patient, unable to localize patient's chart in the ER at this time, daughter agrees to complete new MOLST in the AM    Above discussed with Dr. Monte

## 2021-12-22 NOTE — ED ADULT NURSE REASSESSMENT NOTE - NS ED NURSE REASSESS COMMENT FT1
Patient restless in bed, posey in place , circulation adequate, no signs of injury. Lab telly blood. Precautions maintained

## 2021-12-22 NOTE — CHART NOTE - NSCHARTNOTEFT_GEN_A_CORE
Pt seen and examined with house staff.  Plan formulated and reviewed on rounds     Briefly,  89 y/o male with HTN, HL, DM, Dementia and S/P PPM for bradycardia--sent in from NH with FTT and dehydration.  + COVID.  He has had 2 shots against COVID.  CXR-- Personally reviewed--no infiltrates.  RAZA  Received 1L IVF in ED.    He is confused and unable to provide hx   ros Unobtainable due to clinical condition     NAD  Stable vitals  No fevers    Moves all extreme spont    Labs reviewed    Resolved RAZA  WBC 7    Follow renal Fx  Cont with outpt meds  Doubt VTE.  Can DC V/Q scan  Will look to DC back to NH over next 24 hrs    Med surg bed

## 2021-12-22 NOTE — PROVIDER CONTACT NOTE (FALL NOTIFICATION) - SITUATION
MD Gandara went in to see patient, noted patient on floor and called for help. RN and NA got patient back up and into bed. MD Gandara was at bedside and aware.

## 2021-12-22 NOTE — PATIENT PROFILE ADULT - NSPROHARDOFHEAROTHER_GEN_ALL_CORE
betzaida Jones states pt does not have his hearing device but able to hear better  from his left ear vs his right

## 2021-12-22 NOTE — PHARMACOTHERAPY INTERVENTION NOTE - COMMENTS
Medication history complete, patient is from [facility], reviewed and confirmed medications with list provided by facility.

## 2021-12-23 LAB
ALBUMIN SERPL ELPH-MCNC: 2.7 G/DL — LOW (ref 3.3–5)
ALP SERPL-CCNC: 67 U/L — SIGNIFICANT CHANGE UP (ref 40–120)
ALT FLD-CCNC: 40 U/L — SIGNIFICANT CHANGE UP (ref 12–78)
ANION GAP SERPL CALC-SCNC: 6 MMOL/L — SIGNIFICANT CHANGE UP (ref 5–17)
AST SERPL-CCNC: 48 U/L — HIGH (ref 15–37)
BILIRUB SERPL-MCNC: 0.6 MG/DL — SIGNIFICANT CHANGE UP (ref 0.2–1.2)
BUN SERPL-MCNC: 30 MG/DL — HIGH (ref 7–23)
CALCIUM SERPL-MCNC: 8.9 MG/DL — SIGNIFICANT CHANGE UP (ref 8.5–10.1)
CHLORIDE SERPL-SCNC: 110 MMOL/L — HIGH (ref 96–108)
CO2 SERPL-SCNC: 24 MMOL/L — SIGNIFICANT CHANGE UP (ref 22–31)
CREAT SERPL-MCNC: 1.19 MG/DL — SIGNIFICANT CHANGE UP (ref 0.5–1.3)
GLUCOSE SERPL-MCNC: 101 MG/DL — HIGH (ref 70–99)
HCT VFR BLD CALC: 43.5 % — SIGNIFICANT CHANGE UP (ref 39–50)
HGB BLD-MCNC: 14.6 G/DL — SIGNIFICANT CHANGE UP (ref 13–17)
MCHC RBC-ENTMCNC: 32.8 PG — SIGNIFICANT CHANGE UP (ref 27–34)
MCHC RBC-ENTMCNC: 33.6 GM/DL — SIGNIFICANT CHANGE UP (ref 32–36)
MCV RBC AUTO: 97.8 FL — SIGNIFICANT CHANGE UP (ref 80–100)
PLATELET # BLD AUTO: 120 K/UL — LOW (ref 150–400)
POTASSIUM SERPL-MCNC: 3.7 MMOL/L — SIGNIFICANT CHANGE UP (ref 3.5–5.3)
POTASSIUM SERPL-SCNC: 3.7 MMOL/L — SIGNIFICANT CHANGE UP (ref 3.5–5.3)
PROT SERPL-MCNC: 6.7 GM/DL — SIGNIFICANT CHANGE UP (ref 6–8.3)
RBC # BLD: 4.45 M/UL — SIGNIFICANT CHANGE UP (ref 4.2–5.8)
RBC # FLD: 13.6 % — SIGNIFICANT CHANGE UP (ref 10.3–14.5)
SODIUM SERPL-SCNC: 140 MMOL/L — SIGNIFICANT CHANGE UP (ref 135–145)
WBC # BLD: 6.25 K/UL — SIGNIFICANT CHANGE UP (ref 3.8–10.5)
WBC # FLD AUTO: 6.25 K/UL — SIGNIFICANT CHANGE UP (ref 3.8–10.5)

## 2021-12-23 PROCEDURE — 99232 SBSQ HOSP IP/OBS MODERATE 35: CPT | Mod: GC

## 2021-12-23 RX ORDER — VALSARTAN 80 MG/1
160 TABLET ORAL DAILY
Refills: 0 | Status: DISCONTINUED | OUTPATIENT
Start: 2021-12-23 | End: 2021-12-31

## 2021-12-23 RX ADMIN — Medication 1 DROP(S): at 21:33

## 2021-12-23 RX ADMIN — ATORVASTATIN CALCIUM 40 MILLIGRAM(S): 80 TABLET, FILM COATED ORAL at 21:32

## 2021-12-23 RX ADMIN — Medication 1 DROP(S): at 13:32

## 2021-12-23 RX ADMIN — TAMSULOSIN HYDROCHLORIDE 0.4 MILLIGRAM(S): 0.4 CAPSULE ORAL at 21:30

## 2021-12-23 RX ADMIN — FINASTERIDE 5 MILLIGRAM(S): 5 TABLET, FILM COATED ORAL at 11:03

## 2021-12-23 RX ADMIN — QUETIAPINE FUMARATE 25 MILLIGRAM(S): 200 TABLET, FILM COATED ORAL at 21:33

## 2021-12-23 RX ADMIN — HEPARIN SODIUM 5000 UNIT(S): 5000 INJECTION INTRAVENOUS; SUBCUTANEOUS at 11:04

## 2021-12-23 RX ADMIN — AMLODIPINE BESYLATE 5 MILLIGRAM(S): 2.5 TABLET ORAL at 11:04

## 2021-12-23 RX ADMIN — HEPARIN SODIUM 5000 UNIT(S): 5000 INJECTION INTRAVENOUS; SUBCUTANEOUS at 21:30

## 2021-12-23 NOTE — PROGRESS NOTE ADULT - ASSESSMENT
89 y/o male with PMHx significant for Alzheimer's, bradycardia s/p PPM placement, HTN, HLD, diet controlled diabetes mellitus type 2, Glaucoma, BPH presented today from [facility] with a one day history of generalized weakness, lethargy, malaise, decreased oral intake and runny nose x 3 days. Patient's rapid covid-19 test was positive. Patient is vaccinated against covid-19 (x2).     #Breakthrough Covid-19 infection   -Stable   - Saturation -94% on 2 liters  -Inflammatory markers: D-dimer: 430, ferritin-67, CRP-12, procalcitonin-0.36   -Close observation    #RAZA 2/2 Dehydration   -Resolved  -Baseline Cr 0.9-1  -BUN/Creatinine 38/1.5   -S/p NS 1L bolus in ER  -Avoid nephrotoxins     #Questionable fall in ER   -Patient was found on the floor in ER   -CT head, pelvic X-rays- normal   -Patient able to move all extremities at this time   -Will continue to monitor closely     #Elevated D-dimer  -Possibly due to covid    #HTN  -Continue amlodipine 5mg daily,   -hold Valsartan for now 2/2 RAZA   -DASH/TLC    #BPH  -Continue finasteride 5md daily, tamsulosin 0.4mg qhs     #HLD  -Continue Lipitor 40mg daily     #Alzheimer's disease  -Seroquel qhs PRN    #Glaucoma  -Timolol eye drops     #VTE prophylaxis  -Covid-19 +  -Heparin 5000u subQ q12hrs     Case was discussed with Dr. Cuello

## 2021-12-23 NOTE — CHART NOTE - NSCHARTNOTEFT_GEN_A_CORE
Pt seen and examined with house staff.  Plan formulated and reviewed on rounds     Briefly,  89 y/o male with HTN, HL, DM, Dementia and S/P PPM for bradycardia--sent in from NH with FTT and dehydration.  + COVID.  He has had 2 shots against COVID.  CXR-- Personally reviewed--no infiltrates.  RAZA  Received 1L IVF in ED  He is confused and unable to provide hx   ros Unobtainable due to clinical condition     NAD  Stable vitals  No fevers    Moves all extreme spont    Labs reviewed    Resolved RAZA  WBC 6    Cont with outpt meds  Doubt VTE.  Can DC V/Q scan    Medically stable for DC     Med Surg

## 2021-12-23 NOTE — PROGRESS NOTE ADULT - SUBJECTIVE AND OBJECTIVE BOX
89 y/o male with PMHx significant for Alzheimer's, bradycardia s/p PPM placement, HTN, HLD, diet controlled diabetes mellitus type 2, Glaucoma, BPH presented today from [facility] with a one day history of generalized weakness, lethargy, malaise, decreased oral intake and runny nose x 3 days. Patient's rapid covid-19 test was positive. Patient is vaccinated against covid-19 (x2).     Patient found on floor of ER room on my arrival for examination for admission, noted awake, alert, trying to get up off of the floor, no apparent injuries, at baseline, pleasantly confused as per RN and PCA at bedside.     Subjective-  12/23-Pt was seen by her bedside, not in acute distress, vitals are stable. Pt is awake and but not coherent, probably due to dementia.     Examination-  GENERAL: NAD, lying in bed comfortably  HEAD:  Atraumatic, Normocephalic  EYES: EOMI, PERRLA, conjunctiva and sclera clear  ENT: Moist mucous membranes  NECK: Supple, No JVD  CHEST/LUNG: Clear to auscultation bilaterally; No rales, rhonchi, wheezing, or rubs. Unlabored respirations  HEART: Regular rate and rhythm; No murmurs, rubs, or gallops  ABDOMEN: Bowel sounds present; Soft, Nontender, Nondistended. No hepatomegally  EXTREMITIES:  2+ Peripheral Pulses, brisk capillary refill. No clubbing, cyanosis, or edema    Vital Signs Last 24 Hrs  T(C): 36.9 (23 Dec 2021 08:11), Max: 37.7 (22 Dec 2021 18:59)  T(F): 98.4 (23 Dec 2021 08:11), Max: 99.8 (22 Dec 2021 18:59)  HR: 69 (23 Dec 2021 08:11) (60 - 69)  BP: 169/58 (23 Dec 2021 08:11) (148/62 - 169/58)  BP(mean): --  RR: 18 (23 Dec 2021 08:11) (18 - 18)  SpO2: 94% (23 Dec 2021 08:11) (94% - 97%)                          14.6   6.25  )-----------( 120      ( 23 Dec 2021 06:50 )             43.5   12-23    140  |  110<H>  |  30<H>  ----------------------------<  101<H>  3.7   |  24  |  1.19    Ca    8.9      23 Dec 2021 06:50    TPro  6.7  /  Alb  2.7<L>  /  TBili  0.6  /  DBili  x   /  AST  48<H>  /  ALT  40  /  AlkPhos  67  12-23    MEDICATIONS  (STANDING):  amLODIPine   Tablet 5 milliGRAM(s) Oral daily  atorvastatin 40 milliGRAM(s) Oral at bedtime  finasteride 5 milliGRAM(s) Oral daily  heparin   Injectable 5000 Unit(s) SubCutaneous every 12 hours  tamsulosin 0.4 milliGRAM(s) Oral at bedtime  timolol 0.5% Solution 1 Drop(s) Both EYES two times a day  valsartan 160 milliGRAM(s) Oral daily    MEDICATIONS  (PRN):  acetaminophen     Tablet .. 650 milliGRAM(s) Oral every 6 hours PRN Temp greater or equal to 38C (100.4F), Mild Pain (1 - 3)  aluminum hydroxide/magnesium hydroxide/simethicone Suspension 30 milliLiter(s) Oral every 4 hours PRN Dyspepsia  ondansetron Injectable 4 milliGRAM(s) IV Push every 8 hours PRN Nausea and/or Vomiting  QUEtiapine 25 milliGRAM(s) Oral at bedtime PRN agitation    < from: Xray Pelvis AP only (12.22.21 @ 05:59) >  IMPRESSION: Persistent slight CHF. No acute bony finding.    < end of copied text >  < from: CT Head No Cont (12.22.21 @ 05:23) >  IMPRESSION:  Stable exam. No acute intracranial hemorrhage, vasogenic edema,   extra-axial collection or calvarial fracture. Chronic microvascular   changes.    < end of copied text >

## 2021-12-24 LAB
ALBUMIN SERPL ELPH-MCNC: 2.6 G/DL — LOW (ref 3.3–5)
ALP SERPL-CCNC: 62 U/L — SIGNIFICANT CHANGE UP (ref 40–120)
ALT FLD-CCNC: 39 U/L — SIGNIFICANT CHANGE UP (ref 12–78)
ANION GAP SERPL CALC-SCNC: 6 MMOL/L — SIGNIFICANT CHANGE UP (ref 5–17)
AST SERPL-CCNC: 52 U/L — HIGH (ref 15–37)
BILIRUB SERPL-MCNC: 0.8 MG/DL — SIGNIFICANT CHANGE UP (ref 0.2–1.2)
BUN SERPL-MCNC: 35 MG/DL — HIGH (ref 7–23)
CALCIUM SERPL-MCNC: 9.3 MG/DL — SIGNIFICANT CHANGE UP (ref 8.5–10.1)
CHLORIDE SERPL-SCNC: 110 MMOL/L — HIGH (ref 96–108)
CO2 SERPL-SCNC: 24 MMOL/L — SIGNIFICANT CHANGE UP (ref 22–31)
CREAT SERPL-MCNC: 1.22 MG/DL — SIGNIFICANT CHANGE UP (ref 0.5–1.3)
GLUCOSE SERPL-MCNC: 121 MG/DL — HIGH (ref 70–99)
HCT VFR BLD CALC: 44.3 % — SIGNIFICANT CHANGE UP (ref 39–50)
HGB BLD-MCNC: 14.5 G/DL — SIGNIFICANT CHANGE UP (ref 13–17)
MCHC RBC-ENTMCNC: 32.3 PG — SIGNIFICANT CHANGE UP (ref 27–34)
MCHC RBC-ENTMCNC: 32.7 GM/DL — SIGNIFICANT CHANGE UP (ref 32–36)
MCV RBC AUTO: 98.7 FL — SIGNIFICANT CHANGE UP (ref 80–100)
PLATELET # BLD AUTO: 144 K/UL — LOW (ref 150–400)
POTASSIUM SERPL-MCNC: 3.4 MMOL/L — LOW (ref 3.5–5.3)
POTASSIUM SERPL-SCNC: 3.4 MMOL/L — LOW (ref 3.5–5.3)
PROT SERPL-MCNC: 6.9 GM/DL — SIGNIFICANT CHANGE UP (ref 6–8.3)
RBC # BLD: 4.49 M/UL — SIGNIFICANT CHANGE UP (ref 4.2–5.8)
RBC # FLD: 13.5 % — SIGNIFICANT CHANGE UP (ref 10.3–14.5)
SODIUM SERPL-SCNC: 140 MMOL/L — SIGNIFICANT CHANGE UP (ref 135–145)
WBC # BLD: 5.25 K/UL — SIGNIFICANT CHANGE UP (ref 3.8–10.5)
WBC # FLD AUTO: 5.25 K/UL — SIGNIFICANT CHANGE UP (ref 3.8–10.5)

## 2021-12-24 PROCEDURE — 99232 SBSQ HOSP IP/OBS MODERATE 35: CPT | Mod: GC

## 2021-12-24 RX ORDER — POTASSIUM CHLORIDE 20 MEQ
40 PACKET (EA) ORAL ONCE
Refills: 0 | Status: COMPLETED | OUTPATIENT
Start: 2021-12-24 | End: 2021-12-24

## 2021-12-24 RX ADMIN — Medication 1 DROP(S): at 11:46

## 2021-12-24 RX ADMIN — Medication 40 MILLIEQUIVALENT(S): at 21:17

## 2021-12-24 RX ADMIN — ATORVASTATIN CALCIUM 40 MILLIGRAM(S): 80 TABLET, FILM COATED ORAL at 21:17

## 2021-12-24 RX ADMIN — HEPARIN SODIUM 5000 UNIT(S): 5000 INJECTION INTRAVENOUS; SUBCUTANEOUS at 09:27

## 2021-12-24 RX ADMIN — Medication 1 DROP(S): at 21:18

## 2021-12-24 RX ADMIN — Medication 40 MILLIEQUIVALENT(S): at 11:45

## 2021-12-24 RX ADMIN — TAMSULOSIN HYDROCHLORIDE 0.4 MILLIGRAM(S): 0.4 CAPSULE ORAL at 21:17

## 2021-12-24 RX ADMIN — FINASTERIDE 5 MILLIGRAM(S): 5 TABLET, FILM COATED ORAL at 09:27

## 2021-12-24 RX ADMIN — HEPARIN SODIUM 5000 UNIT(S): 5000 INJECTION INTRAVENOUS; SUBCUTANEOUS at 21:17

## 2021-12-24 RX ADMIN — QUETIAPINE FUMARATE 25 MILLIGRAM(S): 200 TABLET, FILM COATED ORAL at 21:17

## 2021-12-24 RX ADMIN — AMLODIPINE BESYLATE 5 MILLIGRAM(S): 2.5 TABLET ORAL at 09:27

## 2021-12-24 RX ADMIN — VALSARTAN 160 MILLIGRAM(S): 80 TABLET ORAL at 09:28

## 2021-12-24 NOTE — PROGRESS NOTE ADULT - SUBJECTIVE AND OBJECTIVE BOX
89 y/o male with PMHx significant for Alzheimer's, bradycardia s/p PPM placement, HTN, HLD, diet controlled diabetes mellitus type 2, Glaucoma, BPH presented today from [facility] with a one day history of generalized weakness, lethargy, malaise, decreased oral intake and runny nose x 3 days. Patient's rapid covid-19 test was positive. Patient is vaccinated against covid-19 (x2).     Patient found on floor of ER room on my arrival for examination for admission, noted awake, alert, trying to get up off of the floor, no apparent injuries, at baseline, pleasantly confused as per RN and PCA at bedside.     Subjective: Pt seen at bedside. NAD>   Examination-  GENERAL: NAD, lying in bed comfortably  HEAD:  Atraumatic, Normocephalic  EYES: EOMI, PERRLA, conjunctiva and sclera clear  ENT: Moist mucous membranes  NECK: Supple, No JVD  CHEST/LUNG: Clear to auscultation bilaterally; No rales, rhonchi, wheezing, or rubs. Unlabored respirations  HEART: Regular rate and rhythm; No murmurs, rubs, or gallops  ABDOMEN: Bowel sounds present; Soft, Nontender, Nondistended. No hepatomegally  EXTREMITIES:  2+ Peripheral Pulses, brisk capillary refill. No clubbing, cyanosis, or edema    Vital Signs Last 24 Hrs  T(C): 36.9 (23 Dec 2021 08:11), Max: 37.7 (22 Dec 2021 18:59)  T(F): 98.4 (23 Dec 2021 08:11), Max: 99.8 (22 Dec 2021 18:59)  HR: 69 (23 Dec 2021 08:11) (60 - 69)  BP: 169/58 (23 Dec 2021 08:11) (148/62 - 169/58)  BP(mean): --  RR: 18 (23 Dec 2021 08:11) (18 - 18)  SpO2: 94% (23 Dec 2021 08:11) (94% - 97%)                          14.6   6.25  )-----------( 120      ( 23 Dec 2021 06:50 )             43.5   12-23    140  |  110<H>  |  30<H>  ----------------------------<  101<H>  3.7   |  24  |  1.19    Ca    8.9      23 Dec 2021 06:50    TPro  6.7  /  Alb  2.7<L>  /  TBili  0.6  /  DBili  x   /  AST  48<H>  /  ALT  40  /  AlkPhos  67  12-23    MEDICATIONS  (STANDING):  amLODIPine   Tablet 5 milliGRAM(s) Oral daily  atorvastatin 40 milliGRAM(s) Oral at bedtime  finasteride 5 milliGRAM(s) Oral daily  heparin   Injectable 5000 Unit(s) SubCutaneous every 12 hours  tamsulosin 0.4 milliGRAM(s) Oral at bedtime  timolol 0.5% Solution 1 Drop(s) Both EYES two times a day  valsartan 160 milliGRAM(s) Oral daily    MEDICATIONS  (PRN):  acetaminophen     Tablet .. 650 milliGRAM(s) Oral every 6 hours PRN Temp greater or equal to 38C (100.4F), Mild Pain (1 - 3)  aluminum hydroxide/magnesium hydroxide/simethicone Suspension 30 milliLiter(s) Oral every 4 hours PRN Dyspepsia  ondansetron Injectable 4 milliGRAM(s) IV Push every 8 hours PRN Nausea and/or Vomiting  QUEtiapine 25 milliGRAM(s) Oral at bedtime PRN agitation    < from: Xray Pelvis AP only (12.22.21 @ 05:59) >  IMPRESSION: Persistent slight CHF. No acute bony finding.    < end of copied text >  < from: CT Head No Cont (12.22.21 @ 05:23) >  IMPRESSION:  Stable exam. No acute intracranial hemorrhage, vasogenic edema,   extra-axial collection or calvarial fracture. Chronic microvascular   changes.    < end of copied text >     89 y/o male with PMHx significant for Alzheimer's, bradycardia s/p PPM placement, HTN, HLD, diet controlled diabetes mellitus type 2, Glaucoma, BPH presented today from [facility] with a one day history of generalized weakness, lethargy, malaise, decreased oral intake and runny nose x 3 days. Patient's rapid covid-19 test was positive. Patient is vaccinated against covid-19 (x2).     Patient found on floor of ER room on my arrival for examination for admission, noted awake, alert, trying to get up off of the floor, no apparent injuries, at baseline, pleasantly confused as per RN and PCA at bedside.     Subjective: Pt seen at bedside. NAD  Examination-  GENERAL: NAD, lying in bed comfortably  HEAD:  Atraumatic, Normocephalic  EYES: EOMI, PERRLA, conjunctiva and sclera clear  ENT: Moist mucous membranes  NECK: Supple, No JVD  CHEST/LUNG: Clear to auscultation bilaterally; No rales, rhonchi, wheezing, or rubs. Unlabored respirations  HEART: Regular rate and rhythm; No murmurs, rubs, or gallops  ABDOMEN: Bowel sounds present; Soft, Nontender, Nondistended. No hepatomegally  EXTREMITIES:  2+ Peripheral Pulses, brisk capillary refill. No clubbing, cyanosis, or edema    Vital Signs Last 24 Hrs  T(C): 36.9 (23 Dec 2021 08:11), Max: 37.7 (22 Dec 2021 18:59)  T(F): 98.4 (23 Dec 2021 08:11), Max: 99.8 (22 Dec 2021 18:59)  HR: 69 (23 Dec 2021 08:11) (60 - 69)  BP: 169/58 (23 Dec 2021 08:11) (148/62 - 169/58)  BP(mean): --  RR: 18 (23 Dec 2021 08:11) (18 - 18)  SpO2: 94% (23 Dec 2021 08:11) (94% - 97%)                          14.6   6.25  )-----------( 120      ( 23 Dec 2021 06:50 )             43.5   12-23    140  |  110<H>  |  30<H>  ----------------------------<  101<H>  3.7   |  24  |  1.19    Ca    8.9      23 Dec 2021 06:50    TPro  6.7  /  Alb  2.7<L>  /  TBili  0.6  /  DBili  x   /  AST  48<H>  /  ALT  40  /  AlkPhos  67  12-23    MEDICATIONS  (STANDING):  amLODIPine   Tablet 5 milliGRAM(s) Oral daily  atorvastatin 40 milliGRAM(s) Oral at bedtime  finasteride 5 milliGRAM(s) Oral daily  heparin   Injectable 5000 Unit(s) SubCutaneous every 12 hours  tamsulosin 0.4 milliGRAM(s) Oral at bedtime  timolol 0.5% Solution 1 Drop(s) Both EYES two times a day  valsartan 160 milliGRAM(s) Oral daily    MEDICATIONS  (PRN):  acetaminophen     Tablet .. 650 milliGRAM(s) Oral every 6 hours PRN Temp greater or equal to 38C (100.4F), Mild Pain (1 - 3)  aluminum hydroxide/magnesium hydroxide/simethicone Suspension 30 milliLiter(s) Oral every 4 hours PRN Dyspepsia  ondansetron Injectable 4 milliGRAM(s) IV Push every 8 hours PRN Nausea and/or Vomiting  QUEtiapine 25 milliGRAM(s) Oral at bedtime PRN agitation    < from: Xray Pelvis AP only (12.22.21 @ 05:59) >  IMPRESSION: Persistent slight CHF. No acute bony finding.    < end of copied text >  < from: CT Head No Cont (12.22.21 @ 05:23) >  IMPRESSION:  Stable exam. No acute intracranial hemorrhage, vasogenic edema,   extra-axial collection or calvarial fracture. Chronic microvascular   changes.    < end of copied text >

## 2021-12-24 NOTE — PROGRESS NOTE ADULT - ASSESSMENT
Pt has been seen and examined with FP resident, resident supervised agree with a/p       Patient is a 89y old  Male who presents with a chief complaint of Covid-19 infection (23 Dec 2021 16:35)      HPI:  89 y/o male with PMHx significant for Alzheimer's, bradycardia s/p PPM placement, HTN, HLD, diet controlled diabetes mellitus type 2, Glaucoma, BPH presented  from [facility] with a one day history of generalized weakness, lethargy, malaise, decreased oral intake and runny nose x 3 days.     (22 Dec 2021 00:17)      PHYSICAL EXAM:  Vital Signs Last 24 Hrs  T(C): 36.3 (24 Dec 2021 08:15), Max: 36.8 (23 Dec 2021 20:18)  T(F): 97.4 (24 Dec 2021 08:15), Max: 98.3 (23 Dec 2021 20:18)  HR: 78 (24 Dec 2021 08:15) (70 - 78)  BP: 149/79 (24 Dec 2021 08:15) (145/64 - 149/79)  BP(mean): --  RR: 18 (24 Dec 2021 08:15) (16 - 18)  SpO2: 92% (24 Dec 2021 08:15) (92% - 96%)  -rs-aeeb, crackles present   -cvs-s1s2 normal   -p/a-soft,bs+      A/P    #d/c today if no social issue, time spent 45 minutes

## 2021-12-24 NOTE — PROGRESS NOTE ADULT - ASSESSMENT
89 y/o male with PMHx significant for Alzheimer's, bradycardia s/p PPM placement, HTN, HLD, diet controlled diabetes mellitus type 2, Glaucoma, BPH presented today from [facility] with a one day history of generalized weakness, lethargy, malaise, decreased oral intake and runny nose x 3 days. Patient's rapid covid-19 test was positive. Patient is vaccinated against covid-19 (x2).     #Breakthrough Covid-19 infection   -Stable   - Saturation -94% on 2 liters  -Inflammatory markers: D-dimer: 430, ferritin-67, CRP-12, procalcitonin-0.36   -Close observation    #RAZA 2/2 Dehydration   -Resolved  -Baseline Cr 0.9-1  -BUN/Creatinine 38/1.5   -S/p NS 1L bolus in ER  -Avoid nephrotoxins     #Questionable fall in ER   -Patient was found on the floor in ER   -CT head, pelvic X-rays- normal   -Patient able to move all extremities at this time   -Will continue to monitor closely     #Elevated D-dimer  -Possibly due to covid    #HTN  -Continue amlodipine 5mg daily,   -hold Valsartan for now 2/2 RAZA   -DASH/TLC    #BPH  -Continue finasteride 5md daily, tamsulosin 0.4mg qhs     #HLD  -Continue Lipitor 40mg daily     #Alzheimer's disease  -Seroquel qhs PRN    #Glaucoma  -Timolol eye drops     #VTE prophylaxis  -Covid-19 +  -Heparin 5000u subQ q12hrs     Case was discussed with Dr. Cuello    89 y/o male with PMHx significant for Alzheimer's, bradycardia s/p PPM placement, HTN, HLD, diet controlled diabetes mellitus type 2, Glaucoma, BPH presented today from [facility] with a one day history of generalized weakness, lethargy, malaise, decreased oral intake and runny nose x 3 days. Patient's rapid covid-19 test was positive. Patient is vaccinated against covid-19 (x2).     #Breakthrough Covid-19 infection   -Stable   - Saturation -94% on 2 liters  -Inflammatory markers: D-dimer: 430, ferritin-67, CRP-12, procalcitonin-0.36   -Close observation    #RAZA 2/2 Dehydration   -Resolved  -Baseline Cr 0.9-1  -monitor BMP    #Questionable fall in ER   -Patient was found on the floor in ER   -CT head, pelvic X-rays- normal   -Patient able to move all extremities at this time   -Will continue to monitor closely     #Elevated D-dimer  Likely due to covid    #HTN  -Continue amlodipine 5mg daily,   -hold Valsartan for now 2/2 RAZA   -DASH/TLC    #BPH  -Continue finasteride 5md daily, tamsulosin 0.4mg qhs     #HLD  -Continue Lipitor 40mg daily     #Alzheimer's disease  -Seroquel qhs PRN    #Glaucoma  -Timolol eye drops     #VTE prophylaxis  -Covid-19 +  -Heparin 5000u subQ q12hrs     Case was discussed with Dr. Cuello    89 y/o male with PMHx significant for Alzheimer's, bradycardia s/p PPM placement, HTN, HLD, diet controlled diabetes mellitus type 2, Glaucoma, BPH presented today from [facility] with a one day history of generalized weakness, lethargy, malaise, decreased oral intake and runny nose x 3 days. Patient's rapid covid-19 test was positive. Patient is vaccinated against covid-19 (x2).     #Breakthrough Covid-19 infection   -Stable   -Saturation -94% on 2 liters  -Inflammatory markers: D-dimer: 430, ferritin-67, CRP-12, procalcitonin-0.36   -Close observation    #RAZA 2/2 Dehydration   -Resolved  -Baseline Cr 0.9-1  -monitor BMP    #Questionable fall in ER   -Patient was found on the floor in ER   -CT head, pelvic X-rays- normal   -Patient able to move all extremities at this time   -Will continue to monitor closely     #Elevated D-dimer  -Likely due to covid    #HTN  -Continue amlodipine 5mg daily,   -Continue valsartan 160 mg daily   -DASH/TLC    #BPH  -Continue finasteride 5md daily, tamsulosin 0.4mg qhs     #HLD  -Continue Lipitor 40mg daily     #Alzheimer's disease  -Seroquel qhs PRN    #Glaucoma  -Timolol eye drops     #VTE prophylaxis  -Covid-19 +  -Heparin 5000u subQ q12hrs     Dispo: pending placement to [facility]    Case was discussed with Dr. Abreu

## 2021-12-25 LAB
ANION GAP SERPL CALC-SCNC: 4 MMOL/L — LOW (ref 5–17)
BUN SERPL-MCNC: 30 MG/DL — HIGH (ref 7–23)
CALCIUM SERPL-MCNC: 9.5 MG/DL — SIGNIFICANT CHANGE UP (ref 8.5–10.1)
CHLORIDE SERPL-SCNC: 111 MMOL/L — HIGH (ref 96–108)
CO2 SERPL-SCNC: 26 MMOL/L — SIGNIFICANT CHANGE UP (ref 22–31)
CREAT SERPL-MCNC: 1.22 MG/DL — SIGNIFICANT CHANGE UP (ref 0.5–1.3)
GLUCOSE SERPL-MCNC: 107 MG/DL — HIGH (ref 70–99)
POTASSIUM SERPL-MCNC: 3.9 MMOL/L — SIGNIFICANT CHANGE UP (ref 3.5–5.3)
POTASSIUM SERPL-SCNC: 3.9 MMOL/L — SIGNIFICANT CHANGE UP (ref 3.5–5.3)
SODIUM SERPL-SCNC: 141 MMOL/L — SIGNIFICANT CHANGE UP (ref 135–145)

## 2021-12-25 PROCEDURE — 99232 SBSQ HOSP IP/OBS MODERATE 35: CPT | Mod: GC

## 2021-12-25 RX ADMIN — FINASTERIDE 5 MILLIGRAM(S): 5 TABLET, FILM COATED ORAL at 10:43

## 2021-12-25 RX ADMIN — ATORVASTATIN CALCIUM 40 MILLIGRAM(S): 80 TABLET, FILM COATED ORAL at 22:08

## 2021-12-25 RX ADMIN — AMLODIPINE BESYLATE 5 MILLIGRAM(S): 2.5 TABLET ORAL at 10:43

## 2021-12-25 RX ADMIN — Medication 1 DROP(S): at 10:44

## 2021-12-25 RX ADMIN — Medication 1 DROP(S): at 22:08

## 2021-12-25 RX ADMIN — Medication 650 MILLIGRAM(S): at 20:16

## 2021-12-25 RX ADMIN — HEPARIN SODIUM 5000 UNIT(S): 5000 INJECTION INTRAVENOUS; SUBCUTANEOUS at 22:08

## 2021-12-25 RX ADMIN — TAMSULOSIN HYDROCHLORIDE 0.4 MILLIGRAM(S): 0.4 CAPSULE ORAL at 22:08

## 2021-12-25 RX ADMIN — HEPARIN SODIUM 5000 UNIT(S): 5000 INJECTION INTRAVENOUS; SUBCUTANEOUS at 10:42

## 2021-12-25 RX ADMIN — VALSARTAN 160 MILLIGRAM(S): 80 TABLET ORAL at 10:42

## 2021-12-25 RX ADMIN — QUETIAPINE FUMARATE 25 MILLIGRAM(S): 200 TABLET, FILM COATED ORAL at 22:08

## 2021-12-25 NOTE — PROVIDER CONTACT NOTE (OTHER) - BACKGROUND
pt admitted for lethargy/malaise, had a fall in ED, in posey vest for agitation/trying to continuously get out of bed.

## 2021-12-25 NOTE — PROGRESS NOTE ADULT - ASSESSMENT
Pt has been seen and examined with FP resident, resident supervised agree with a/p       Patient is a 89y old  Male who presents with a chief complaint of Covid-19 infection (23 Dec 2021 16:35)      HPI:  89 y/o male with PMHx significant for Alzheimer's, bradycardia s/p PPM placement, HTN, HLD, diet controlled diabetes mellitus type 2, Glaucoma, BPH presented  from [facility] with a one day history of generalized weakness, lethargy, malaise, decreased oral intake and runny nose x 3 days.     (22 Dec 2021 00:17)      PHYSICAL EXAM:    -rs-aeeb, crackles present   -cvs-s1s2 normal   -p/a-soft,bs+      A/P    #supportive care     #dvt pr

## 2021-12-25 NOTE — PROGRESS NOTE ADULT - ASSESSMENT
89 y/o male with PMHx significant for Alzheimer's, bradycardia s/p PPM placement, HTN, HLD, diet controlled diabetes mellitus type 2, Glaucoma, BPH presented today from [facility] with a one day history of generalized weakness, lethargy, malaise, decreased oral intake and runny nose x 3 days. Patient's rapid covid-19 test was positive. Patient is vaccinated against covid-19 (x2).     #Breakthrough Covid-19 infection   -Stable   -Saturation -98% on nasal canula  -Inflammatory markers: D-dimer: 430, ferritin-67, CRP-12, procalcitonin-0.36   -Close observation    #RAZA 2/2 Dehydration   -Resolved  -Baseline Cr 0.9-1  -monitor BMP    #Questionable fall in ER   -Patient was found on the floor in ER   -CT head, pelvic X-rays- normal   -Patient able to move all extremities at this time   -Will continue to monitor closely     #Elevated D-dimer  -Likely due to covid    #HTN  -Continue amlodipine 5mg daily,   -Continue valsartan 160 mg daily   -DASH/TLC    #BPH  -Continue finasteride 5md daily, tamsulosin 0.4mg qhs     #HLD  -Continue Lipitor 40mg daily     #Alzheimer's disease  -Seroquel qhs PRN    #Glaucoma  -Timolol eye drops     #VTE prophylaxis  -Covid-19 +  -Heparin 5000u subQ q12hrs     Dispo: Pending placement to [facility]    Case was discussed with Dr. Abreu

## 2021-12-25 NOTE — PROVIDER CONTACT NOTE (OTHER) - ACTION/TREATMENT ORDERED:
MD advised to give tylenol and to monitor for any other complaint of pain. if continue will do an EKG

## 2021-12-25 NOTE — PROGRESS NOTE ADULT - SUBJECTIVE AND OBJECTIVE BOX
89 y/o male with PMHx significant for Alzheimer's, bradycardia s/p PPM placement, HTN, HLD, diet controlled diabetes mellitus type 2, Glaucoma, BPH presented today from [facility] with a one day history of generalized weakness, lethargy, malaise, decreased oral intake and runny nose x 3 days. Patient's rapid covid-19 test was positive. Patient is vaccinated against covid-19 (x2). Patient found on floor of ER room on my arrival for examination for admission, noted awake, alert, trying to get up off of the floor, no apparent injuries, at baseline, pleasantly confused as per RN and PCA at bedside.     Subjective:    12/25- Pt seen at bedside. NAD, no complaints at this point of time. Pt     Examination-  GENERAL: NAD, lying in bed comfortably  HEAD:  Atraumatic, Normocephalic  EYES: EOMI, PERRLA, conjunctiva and sclera clear  ENT: Moist mucous membranes  NECK: Supple, No JVD  CHEST/LUNG: Clear to auscultation bilaterally; No rales, rhonchi, wheezing, or rubs. Unlabored respirations  HEART: Regular rate and rhythm; No murmurs, rubs, or gallops  ABDOMEN: Bowel sounds present; Soft, Nontender, Nondistended. No hepatomegally  EXTREMITIES:  2+ Peripheral Pulses, brisk capillary refill. No clubbing, cyanosis, or edema    Vital Signs Last 24 Hrs  T(C): 36.5 (24 Dec 2021 20:19), Max: 36.5 (24 Dec 2021 20:19)  T(F): 97.7 (24 Dec 2021 20:19), Max: 97.7 (24 Dec 2021 20:19)  HR: 75 (24 Dec 2021 20:19) (75 - 75)  BP: 154/63 (24 Dec 2021 20:19) (154/63 - 154/63)  BP(mean): --  RR: 18 (24 Dec 2021 20:19) (18 - 18)  SpO2: 98% (24 Dec 2021 20:19) (98% - 98%)                                  14.5   5.25  )-----------( 144      ( 24 Dec 2021 07:45 )             44.3   12-25    141  |  111<H>  |  30<H>  ----------------------------<  107<H>  3.9   |  26  |  1.22    Ca    9.5      25 Dec 2021 06:38    TPro  6.9  /  Alb  2.6<L>  /  TBili  0.8  /  DBili  x   /  AST  52<H>  /  ALT  39  /  AlkPhos  62  12-24    MEDICATIONS  (STANDING):  amLODIPine   Tablet 5 milliGRAM(s) Oral daily  atorvastatin 40 milliGRAM(s) Oral at bedtime  finasteride 5 milliGRAM(s) Oral daily  heparin   Injectable 5000 Unit(s) SubCutaneous every 12 hours  tamsulosin 0.4 milliGRAM(s) Oral at bedtime  timolol 0.5% Solution 1 Drop(s) Both EYES two times a day  valsartan 160 milliGRAM(s) Oral daily    MEDICATIONS  (PRN):  acetaminophen     Tablet .. 650 milliGRAM(s) Oral every 6 hours PRN Temp greater or equal to 38C (100.4F), Mild Pain (1 - 3)  aluminum hydroxide/magnesium hydroxide/simethicone Suspension 30 milliLiter(s) Oral every 4 hours PRN Dyspepsia  ondansetron Injectable 4 milliGRAM(s) IV Push every 8 hours PRN Nausea and/or Vomiting  QUEtiapine 25 milliGRAM(s) Oral at bedtime PRN agitation      < from: Xray Pelvis AP only (12.22.21 @ 05:59) >  IMPRESSION: Persistent slight CHF. No acute bony finding.    < end of copied text >  < from: CT Head No Cont (12.22.21 @ 05:23) >  IMPRESSION:  Stable exam. No acute intracranial hemorrhage, vasogenic edema,   extra-axial collection or calvarial fracture. Chronic microvascular   changes.    < end of copied text >

## 2021-12-26 LAB
ALBUMIN SERPL ELPH-MCNC: 2.5 G/DL — LOW (ref 3.3–5)
ALP SERPL-CCNC: 64 U/L — SIGNIFICANT CHANGE UP (ref 40–120)
ALT FLD-CCNC: 41 U/L — SIGNIFICANT CHANGE UP (ref 12–78)
ANION GAP SERPL CALC-SCNC: 5 MMOL/L — SIGNIFICANT CHANGE UP (ref 5–17)
AST SERPL-CCNC: 59 U/L — HIGH (ref 15–37)
BILIRUB SERPL-MCNC: 0.6 MG/DL — SIGNIFICANT CHANGE UP (ref 0.2–1.2)
BUN SERPL-MCNC: 30 MG/DL — HIGH (ref 7–23)
CALCIUM SERPL-MCNC: 9.1 MG/DL — SIGNIFICANT CHANGE UP (ref 8.5–10.1)
CHLORIDE SERPL-SCNC: 110 MMOL/L — HIGH (ref 96–108)
CO2 SERPL-SCNC: 26 MMOL/L — SIGNIFICANT CHANGE UP (ref 22–31)
CREAT SERPL-MCNC: 1.38 MG/DL — HIGH (ref 0.5–1.3)
GLUCOSE SERPL-MCNC: 98 MG/DL — SIGNIFICANT CHANGE UP (ref 70–99)
HCT VFR BLD CALC: 43.1 % — SIGNIFICANT CHANGE UP (ref 39–50)
HGB BLD-MCNC: 14.4 G/DL — SIGNIFICANT CHANGE UP (ref 13–17)
MCHC RBC-ENTMCNC: 32.7 PG — SIGNIFICANT CHANGE UP (ref 27–34)
MCHC RBC-ENTMCNC: 33.4 GM/DL — SIGNIFICANT CHANGE UP (ref 32–36)
MCV RBC AUTO: 97.7 FL — SIGNIFICANT CHANGE UP (ref 80–100)
PLATELET # BLD AUTO: 147 K/UL — LOW (ref 150–400)
POTASSIUM SERPL-MCNC: 3.7 MMOL/L — SIGNIFICANT CHANGE UP (ref 3.5–5.3)
POTASSIUM SERPL-SCNC: 3.7 MMOL/L — SIGNIFICANT CHANGE UP (ref 3.5–5.3)
PROT SERPL-MCNC: 6.6 GM/DL — SIGNIFICANT CHANGE UP (ref 6–8.3)
RBC # BLD: 4.41 M/UL — SIGNIFICANT CHANGE UP (ref 4.2–5.8)
RBC # FLD: 13.6 % — SIGNIFICANT CHANGE UP (ref 10.3–14.5)
SODIUM SERPL-SCNC: 141 MMOL/L — SIGNIFICANT CHANGE UP (ref 135–145)
WBC # BLD: 3.6 K/UL — LOW (ref 3.8–10.5)
WBC # FLD AUTO: 3.6 K/UL — LOW (ref 3.8–10.5)

## 2021-12-26 PROCEDURE — 99232 SBSQ HOSP IP/OBS MODERATE 35: CPT | Mod: GC

## 2021-12-26 RX ORDER — SODIUM CHLORIDE 9 MG/ML
1000 INJECTION INTRAMUSCULAR; INTRAVENOUS; SUBCUTANEOUS
Refills: 0 | Status: DISCONTINUED | OUTPATIENT
Start: 2021-12-26 | End: 2021-12-28

## 2021-12-26 RX ADMIN — Medication 1 DROP(S): at 22:06

## 2021-12-26 RX ADMIN — Medication 1 DROP(S): at 09:48

## 2021-12-26 RX ADMIN — SODIUM CHLORIDE 75 MILLILITER(S): 9 INJECTION INTRAMUSCULAR; INTRAVENOUS; SUBCUTANEOUS at 18:39

## 2021-12-26 RX ADMIN — HEPARIN SODIUM 5000 UNIT(S): 5000 INJECTION INTRAVENOUS; SUBCUTANEOUS at 22:07

## 2021-12-26 RX ADMIN — QUETIAPINE FUMARATE 25 MILLIGRAM(S): 200 TABLET, FILM COATED ORAL at 22:07

## 2021-12-26 RX ADMIN — ATORVASTATIN CALCIUM 40 MILLIGRAM(S): 80 TABLET, FILM COATED ORAL at 22:07

## 2021-12-26 RX ADMIN — AMLODIPINE BESYLATE 5 MILLIGRAM(S): 2.5 TABLET ORAL at 09:48

## 2021-12-26 RX ADMIN — VALSARTAN 160 MILLIGRAM(S): 80 TABLET ORAL at 09:48

## 2021-12-26 RX ADMIN — HEPARIN SODIUM 5000 UNIT(S): 5000 INJECTION INTRAVENOUS; SUBCUTANEOUS at 09:48

## 2021-12-26 RX ADMIN — FINASTERIDE 5 MILLIGRAM(S): 5 TABLET, FILM COATED ORAL at 09:48

## 2021-12-26 RX ADMIN — TAMSULOSIN HYDROCHLORIDE 0.4 MILLIGRAM(S): 0.4 CAPSULE ORAL at 22:07

## 2021-12-26 NOTE — PROGRESS NOTE ADULT - ASSESSMENT
89 y/o male with PMHx significant for Alzheimer's, bradycardia s/p PPM placement, HTN, HLD, diet controlled diabetes mellitus type 2, Glaucoma, BPH presented today from [facility] with a one day history of generalized weakness, lethargy, malaise, decreased oral intake and runny nose x 3 days. Patient's rapid covid-19 test was positive. Patient is vaccinated against covid-19 (x2).       #Breakthrough Covid-19 infection   -Stable   -Saturation -98% on nasal canula  -Inflammatory markers: D-dimer: 430, ferritin-67, CRP-12, procalcitonin-0.36   -Close observation    #RAZA 2/2 Dehydration   -Recurred   -Baseline Cr 0.9-1  -AM Cr 1.38 from 1.22 the day prior   -monitor BMP  -Consider the reinstatement of IVF hydration     #Questionable fall in ER   -Patient was found on the floor in ER   -CT head, pelvic X-rays- normal   -Patient able to move all extremities at this time   -Will continue to monitor closely     #Elevated D-dimer  -Likely due to covid    #HTN  -Continue amlodipine 5mg daily,   -Continue valsartan 160 mg daily   -DASH/TLC    #BPH  -Continue finasteride 5md daily, tamsulosin 0.4mg qhs     #HLD  -Continue Lipitor 40mg daily     #Alzheimer's disease  -Seroquel qhs PRN    #Glaucoma  -Timolol eye drops     #VTE prophylaxis  -Covid-19 +  -Heparin 5000u subQ q12hrs     Dispo: Pending placement to [facility], monday morning     Case was discussed with Dr. Abreu

## 2021-12-26 NOTE — PROGRESS NOTE ADULT - ASSESSMENT
Pt has been seen and examined with FP resident, resident supervised agree with a/p       Patient is a 89y old  Male who presents with a chief complaint of Covid-19 infection (23 Dec 2021 16:35)      HPI:  89 y/o male with PMHx significant for Alzheimer's, bradycardia s/p PPM placement, HTN, HLD, diet controlled diabetes mellitus type 2, Glaucoma, BPH presented  from [facility] with a one day history of generalized weakness, lethargy, malaise, decreased oral intake and runny nose x 3 days.     (22 Dec 2021 00:17)      PHYSICAL EXAM:    -rs-aeeb, crackles present   -cvs-s1s2 normal   -p/a-soft,bs+      A/P    #supportive care     #dvt pr

## 2021-12-27 LAB
ALBUMIN SERPL ELPH-MCNC: 2.1 G/DL — LOW (ref 3.3–5)
ALP SERPL-CCNC: 54 U/L — SIGNIFICANT CHANGE UP (ref 40–120)
ALT FLD-CCNC: 40 U/L — SIGNIFICANT CHANGE UP (ref 12–78)
ANION GAP SERPL CALC-SCNC: 7 MMOL/L — SIGNIFICANT CHANGE UP (ref 5–17)
AST SERPL-CCNC: 59 U/L — HIGH (ref 15–37)
BILIRUB SERPL-MCNC: 0.5 MG/DL — SIGNIFICANT CHANGE UP (ref 0.2–1.2)
BUN SERPL-MCNC: 25 MG/DL — HIGH (ref 7–23)
CALCIUM SERPL-MCNC: 8.7 MG/DL — SIGNIFICANT CHANGE UP (ref 8.5–10.1)
CHLORIDE SERPL-SCNC: 110 MMOL/L — HIGH (ref 96–108)
CO2 SERPL-SCNC: 21 MMOL/L — LOW (ref 22–31)
CREAT SERPL-MCNC: 1.24 MG/DL — SIGNIFICANT CHANGE UP (ref 0.5–1.3)
GLUCOSE SERPL-MCNC: 97 MG/DL — SIGNIFICANT CHANGE UP (ref 70–99)
HCT VFR BLD CALC: 39.7 % — SIGNIFICANT CHANGE UP (ref 39–50)
HGB BLD-MCNC: 13.4 G/DL — SIGNIFICANT CHANGE UP (ref 13–17)
MCHC RBC-ENTMCNC: 32.4 PG — SIGNIFICANT CHANGE UP (ref 27–34)
MCHC RBC-ENTMCNC: 33.8 GM/DL — SIGNIFICANT CHANGE UP (ref 32–36)
MCV RBC AUTO: 96.1 FL — SIGNIFICANT CHANGE UP (ref 80–100)
PLATELET # BLD AUTO: 140 K/UL — LOW (ref 150–400)
POTASSIUM SERPL-MCNC: 3.6 MMOL/L — SIGNIFICANT CHANGE UP (ref 3.5–5.3)
POTASSIUM SERPL-SCNC: 3.6 MMOL/L — SIGNIFICANT CHANGE UP (ref 3.5–5.3)
PROT SERPL-MCNC: 5.9 GM/DL — LOW (ref 6–8.3)
RBC # BLD: 4.13 M/UL — LOW (ref 4.2–5.8)
RBC # FLD: 13.5 % — SIGNIFICANT CHANGE UP (ref 10.3–14.5)
SODIUM SERPL-SCNC: 138 MMOL/L — SIGNIFICANT CHANGE UP (ref 135–145)
WBC # BLD: 3.33 K/UL — LOW (ref 3.8–10.5)
WBC # FLD AUTO: 3.33 K/UL — LOW (ref 3.8–10.5)

## 2021-12-27 PROCEDURE — 99232 SBSQ HOSP IP/OBS MODERATE 35: CPT | Mod: GC

## 2021-12-27 RX ADMIN — TAMSULOSIN HYDROCHLORIDE 0.4 MILLIGRAM(S): 0.4 CAPSULE ORAL at 22:14

## 2021-12-27 RX ADMIN — HEPARIN SODIUM 5000 UNIT(S): 5000 INJECTION INTRAVENOUS; SUBCUTANEOUS at 22:15

## 2021-12-27 RX ADMIN — QUETIAPINE FUMARATE 25 MILLIGRAM(S): 200 TABLET, FILM COATED ORAL at 22:14

## 2021-12-27 RX ADMIN — VALSARTAN 160 MILLIGRAM(S): 80 TABLET ORAL at 09:35

## 2021-12-27 RX ADMIN — ATORVASTATIN CALCIUM 40 MILLIGRAM(S): 80 TABLET, FILM COATED ORAL at 22:14

## 2021-12-27 RX ADMIN — Medication 1 DROP(S): at 09:31

## 2021-12-27 RX ADMIN — AMLODIPINE BESYLATE 5 MILLIGRAM(S): 2.5 TABLET ORAL at 09:31

## 2021-12-27 RX ADMIN — HEPARIN SODIUM 5000 UNIT(S): 5000 INJECTION INTRAVENOUS; SUBCUTANEOUS at 09:30

## 2021-12-27 RX ADMIN — Medication 1 DROP(S): at 22:14

## 2021-12-27 RX ADMIN — FINASTERIDE 5 MILLIGRAM(S): 5 TABLET, FILM COATED ORAL at 09:30

## 2021-12-27 NOTE — PROGRESS NOTE ADULT - ASSESSMENT
89 y/o male with PMHx significant for Alzheimer's, bradycardia s/p PPM placement, HTN, HLD, diet controlled diabetes mellitus type 2, Glaucoma, BPH presented from [facility] with a one day history of generalized weakness, lethargy, malaise, decreased oral intake and runny nose x 3 days. Patient found to be positive for COVID. Patient is vaccinated against covid-19 (x2).     #COVID 19  -patient previously vaccinated x2   -patient currently Stable and Saturating well on RA  -continue to monitor    #RAZA 2/2 Dehydration   -Recurred   -Baseline Cr 0.9-1  -cr. 1.22>1.33> 1.24  -will continue to monitor BMPs    #Questionable fall in ER   -Patient was found on the floor in ER   -CT head, pelvic X-rays- normal   -Patient able to move all extremities at this time   -Will continue to monitor closely     #Elevated D-dimer  -2/2 to covid 19 infection vs. less likely cardiac etiology     #HTN  -Continue amlodipine 5mg daily,   -Continue valsartan 160 mg daily   -DASH/TLC    #BPH  -Continue finasteride 5md daily, tamsulosin 0.4mg qhs     #HLD  -Continue Lipitor 40mg daily     #Alzheimer's disease  -Seroquel qhs PRN    #Glaucoma  -Timolol eye drops     #VTE prophylaxis  -Covid-19 +  -Heparin 5000u subQ q12hrs     Dispo: Pending placement to [facility] with Covid + capabilities    89 y/o male with PMHx significant for Alzheimer's, bradycardia s/p PPM placement, HTN, HLD, diet controlled diabetes mellitus type 2, Glaucoma, BPH presented from [facility] with a one day history of generalized weakness, lethargy, malaise, decreased oral intake and runny nose x 3 days. Patient found to be positive for COVID. Patient is vaccinated against covid-19 (x2).     #COVID 19  -patient previously vaccinated x2   -patient currently Stable and Saturating well on RA  -continue to monitor    #RAZA 2/2 Dehydration   -Recurred   -Baseline Cr 0.9-1  -cr. 1.22>1.33> 1.24  -will continue to monitor BMPs    #Questionable fall in ER   -Patient was found on the floor in ER   -CT head, pelvic X-rays- normal   -Patient able to move all extremities at this time   -Will continue to monitor closely     #Elevated D-dimer  -2/2 to covid 19 infection vs. less likely cardiac etiology     #HTN  -Continue amlodipine 5mg daily,   -Continue valsartan 160 mg daily   -DASH/TLC    #BPH  -Continue finasteride 5md daily, tamsulosin 0.4mg qhs     #HLD  -Continue Lipitor 40mg daily     #Alzheimer's disease  -Seroquel qhs PRN    #Glaucoma  -Timolol eye drops     #VTE prophylaxis  -Covid-19 +  -Heparin 5000u subQ q12hrs     Dispo: Patient long-term care recipient at [facility], unable to accommodate given covid status, earliest possible discharge date 12/31 (10 days out from positive result)

## 2021-12-27 NOTE — PROGRESS NOTE ADULT - ASSESSMENT
Pt has been seen and examined with FP resident, resident supervised agree with a/p       Patient is a 89y old  Male who presents with a chief complaint of Covid-19 infection (23 Dec 2021 16:35)      HPI:  89 y/o male with PMHx significant for Alzheimer's, bradycardia s/p PPM placement, HTN, HLD, diet controlled diabetes mellitus type 2, Glaucoma, BPH presented  from [facility] with a one day history of generalized weakness, lethargy, malaise, decreased oral intake and runny nose x 3 days.     (22 Dec 2021 00:17)      PHYSICAL EXAM:    -rs-aeeb, crackles present   -cvs-s1s2 normal   -p/a-soft,bs+      A/P    #Awaiting placement     #d/c plan

## 2021-12-28 PROCEDURE — 99232 SBSQ HOSP IP/OBS MODERATE 35: CPT | Mod: GC

## 2021-12-28 RX ADMIN — ATORVASTATIN CALCIUM 40 MILLIGRAM(S): 80 TABLET, FILM COATED ORAL at 21:01

## 2021-12-28 RX ADMIN — FINASTERIDE 5 MILLIGRAM(S): 5 TABLET, FILM COATED ORAL at 10:44

## 2021-12-28 RX ADMIN — HEPARIN SODIUM 5000 UNIT(S): 5000 INJECTION INTRAVENOUS; SUBCUTANEOUS at 21:02

## 2021-12-28 RX ADMIN — VALSARTAN 160 MILLIGRAM(S): 80 TABLET ORAL at 10:43

## 2021-12-28 RX ADMIN — Medication 1 DROP(S): at 21:01

## 2021-12-28 RX ADMIN — AMLODIPINE BESYLATE 5 MILLIGRAM(S): 2.5 TABLET ORAL at 10:43

## 2021-12-28 RX ADMIN — HEPARIN SODIUM 5000 UNIT(S): 5000 INJECTION INTRAVENOUS; SUBCUTANEOUS at 10:44

## 2021-12-28 RX ADMIN — Medication 1 DROP(S): at 10:46

## 2021-12-28 RX ADMIN — TAMSULOSIN HYDROCHLORIDE 0.4 MILLIGRAM(S): 0.4 CAPSULE ORAL at 21:01

## 2021-12-28 NOTE — PROGRESS NOTE ADULT - ASSESSMENT
89 y/o male with PMHx significant for Alzheimer's, bradycardia s/p PPM placement, HTN, HLD, diet controlled diabetes mellitus type 2, Glaucoma, BPH presented from [facility] with a one day history of generalized weakness, lethargy, malaise, decreased oral intake and runny nose x 3 days. Patient found to be positive for COVID. Patient is vaccinated against covid-19 (x2).     #COVID 19  -PCR detected COVID on 12/21/21  -patient previously vaccinated x2   -patient currently Stable and Saturating well on RA  -continue to monitor  -Cotnnue with 10 day quarentine     #RAZA 2/2 Dehydration   -Now improved s/p IVF maintainece   -Baseline Cr 0.9-1  -cr. 1.22>1.33> 1.24  -will continue to monitor BMPs        #HTN  -Continue amlodipine 5mg daily,   -Continue valsartan 160 mg daily   -DASH/TLC PO DIET     #BPH  -Continue finasteride 5md daily  Continue with  tamsulosin 0.4mg qhs     #HLD  -Continue Lipitor 40mg daily   -DASH/TLC PO DIET       #Alzheimer's disease  -Seroquel qhs PRN    #Glaucoma  -Timolol eye drops     #VTE prophylaxis  -Covid-19 +  -Heparin 5000u subQ q12hrs     Dispo: Patient long-term care recipient at [facility], unable to accommodate given covid status, earliest possible discharge date 12/31 (10 days out from positive result)

## 2021-12-28 NOTE — PROGRESS NOTE ADULT - SUBJECTIVE AND OBJECTIVE BOX
89 y/o male with PMHx significant for Alzheimer's, bradycardia s/p PPM placement, HTN, HLD, diet controlled diabetes mellitus type 2, Glaucoma, BPH presented to HNT from [facility] with a one day history of generalized weakness, lethargy, malaise, decreased oral intake and runny nose x 3 days. Patient's rapid covid-19 test was positive. Patient is vaccinated against covid-19 (x2). Patient found on floor of ER room on my arrival for examination for admission, noted awake, alert, trying to get up off of the floor, no apparent injuries, at baseline, pleasantly confused as per RN and PCA at bedside.     Subjective: Patient was seen and examined by me this morning at bedside, with no complaints and no acute events overnight or episodes of agression/agitation         Vital Signs Last 24 Hrs  T(C): 36.8 (28 Dec 2021 07:27), Max: 37.1 (27 Dec 2021 09:40)  T(F): 98.2 (28 Dec 2021 07:27), Max: 98.7 (27 Dec 2021 09:40)  HR: 66 (28 Dec 2021 07:27) (66 - 68)  BP: 146/55 (28 Dec 2021 07:27) (140/61 - 158/91)  RR: 18 (28 Dec 2021 07:27) (18 - 18)  SpO2: 92% (28 Dec 2021 07:27) (92% - 93%)    Constitutional: Pt lying in bed, awake and alert, NAD on room air  HEENT: EOMI, normal hearing, moist mucous membranes  Neck: Soft and supple, no JVD  Respiratory: CTABL, No wheezing, rales or rhonchi  Cardiovascular: S1S2+, RRR, no M/G/R  Gastrointestinal: BS+, soft, NT/ND, no guarding, no rebound  Extremities: No peripheral edema  Vascular: 2+ peripheral pulses  Neurological: AAOx3, no focal deficits  Musculoskeletal: 5/5 strength b/l upper and lower extremities  Skin: No rashes    MEDICATIONS:  MEDICATIONS  (STANDING):  amLODIPine   Tablet 5 milliGRAM(s) Oral daily  atorvastatin 40 milliGRAM(s) Oral at bedtime  finasteride 5 milliGRAM(s) Oral daily  heparin   Injectable 5000 Unit(s) SubCutaneous every 12 hours  sodium chloride 0.9%. 1000 milliLiter(s) (75 mL/Hr) IV Continuous <Continuous>  tamsulosin 0.4 milliGRAM(s) Oral at bedtime  timolol 0.5% Solution 1 Drop(s) Both EYES two times a day  valsartan 160 milliGRAM(s) Oral daily    MEDICATIONS  (PRN):  acetaminophen     Tablet .. 650 milliGRAM(s) Oral every 6 hours PRN Temp greater or equal to 38C (100.4F), Mild Pain (1 - 3)  aluminum hydroxide/magnesium hydroxide/simethicone Suspension 30 milliLiter(s) Oral every 4 hours PRN Dyspepsia  ondansetron Injectable 4 milliGRAM(s) IV Push every 8 hours PRN Nausea and/or Vomiting  QUEtiapine 25 milliGRAM(s) Oral at bedtime PRN agitation    LABS                        13.4   3.33  )-----------( 140      ( 27 Dec 2021 06:52 )             39.7     27 Dec 2021 06:52    138    |  110    |  25     ----------------------------<  97     3.6     |  21     |  1.24     Ca    8.7        27 Dec 2021 06:52    TPro  5.9    /  Alb  2.1    /  TBili  0.5    /  DBili  x      /  AST  59     /  ALT  40     /  AlkPhos  54     27 Dec 2021 06:52    LIVER FUNCTIONS - ( 27 Dec 2021 06:52 )  Alb: 2.1 g/dL / Pro: 5.9 gm/dL / ALK PHOS: 54 U/L / ALT: 40 U/L / AST: 59 U/L / GGT: x             CAPILLARY BLOOD GLUCOSE

## 2021-12-29 LAB
ALBUMIN SERPL ELPH-MCNC: 2 G/DL — LOW (ref 3.3–5)
ALP SERPL-CCNC: 57 U/L — SIGNIFICANT CHANGE UP (ref 40–120)
ALT FLD-CCNC: 40 U/L — SIGNIFICANT CHANGE UP (ref 12–78)
ANION GAP SERPL CALC-SCNC: 6 MMOL/L — SIGNIFICANT CHANGE UP (ref 5–17)
AST SERPL-CCNC: 49 U/L — HIGH (ref 15–37)
BILIRUB SERPL-MCNC: 0.5 MG/DL — SIGNIFICANT CHANGE UP (ref 0.2–1.2)
BUN SERPL-MCNC: 28 MG/DL — HIGH (ref 7–23)
CALCIUM SERPL-MCNC: 8.8 MG/DL — SIGNIFICANT CHANGE UP (ref 8.5–10.1)
CHLORIDE SERPL-SCNC: 108 MMOL/L — SIGNIFICANT CHANGE UP (ref 96–108)
CO2 SERPL-SCNC: 24 MMOL/L — SIGNIFICANT CHANGE UP (ref 22–31)
CREAT SERPL-MCNC: 1.16 MG/DL — SIGNIFICANT CHANGE UP (ref 0.5–1.3)
GLUCOSE SERPL-MCNC: 115 MG/DL — HIGH (ref 70–99)
HCT VFR BLD CALC: 38.5 % — LOW (ref 39–50)
HGB BLD-MCNC: 13.2 G/DL — SIGNIFICANT CHANGE UP (ref 13–17)
MCHC RBC-ENTMCNC: 32.6 PG — SIGNIFICANT CHANGE UP (ref 27–34)
MCHC RBC-ENTMCNC: 34.3 GM/DL — SIGNIFICANT CHANGE UP (ref 32–36)
MCV RBC AUTO: 95.1 FL — SIGNIFICANT CHANGE UP (ref 80–100)
PLATELET # BLD AUTO: 157 K/UL — SIGNIFICANT CHANGE UP (ref 150–400)
POTASSIUM SERPL-MCNC: 3.3 MMOL/L — LOW (ref 3.5–5.3)
POTASSIUM SERPL-SCNC: 3.3 MMOL/L — LOW (ref 3.5–5.3)
PROT SERPL-MCNC: 5.8 GM/DL — LOW (ref 6–8.3)
RBC # BLD: 4.05 M/UL — LOW (ref 4.2–5.8)
RBC # FLD: 13.2 % — SIGNIFICANT CHANGE UP (ref 10.3–14.5)
SODIUM SERPL-SCNC: 138 MMOL/L — SIGNIFICANT CHANGE UP (ref 135–145)
WBC # BLD: 4.21 K/UL — SIGNIFICANT CHANGE UP (ref 3.8–10.5)
WBC # FLD AUTO: 4.21 K/UL — SIGNIFICANT CHANGE UP (ref 3.8–10.5)

## 2021-12-29 PROCEDURE — 99232 SBSQ HOSP IP/OBS MODERATE 35: CPT | Mod: GC

## 2021-12-29 RX ORDER — POTASSIUM CHLORIDE 20 MEQ
40 PACKET (EA) ORAL EVERY 4 HOURS
Refills: 0 | Status: COMPLETED | OUTPATIENT
Start: 2021-12-29 | End: 2021-12-29

## 2021-12-29 RX ORDER — POTASSIUM CHLORIDE 20 MEQ
20 PACKET (EA) ORAL
Refills: 0 | Status: COMPLETED | OUTPATIENT
Start: 2021-12-29 | End: 2021-12-29

## 2021-12-29 RX ADMIN — Medication 20 MILLIEQUIVALENT(S): at 12:31

## 2021-12-29 RX ADMIN — HEPARIN SODIUM 5000 UNIT(S): 5000 INJECTION INTRAVENOUS; SUBCUTANEOUS at 10:44

## 2021-12-29 RX ADMIN — FINASTERIDE 5 MILLIGRAM(S): 5 TABLET, FILM COATED ORAL at 10:44

## 2021-12-29 RX ADMIN — Medication 20 MILLIEQUIVALENT(S): at 16:44

## 2021-12-29 RX ADMIN — VALSARTAN 160 MILLIGRAM(S): 80 TABLET ORAL at 10:45

## 2021-12-29 RX ADMIN — Medication 20 MILLIEQUIVALENT(S): at 14:09

## 2021-12-29 RX ADMIN — Medication 1 DROP(S): at 10:45

## 2021-12-29 RX ADMIN — AMLODIPINE BESYLATE 5 MILLIGRAM(S): 2.5 TABLET ORAL at 10:44

## 2021-12-29 NOTE — PROGRESS NOTE ADULT - SUBJECTIVE AND OBJECTIVE BOX
89 y/o male with PMHx significant for Alzheimer's, bradycardia s/p PPM placement, HTN, HLD, diet controlled diabetes mellitus type 2, Glaucoma, BPH presented to HNT from [facility] with a one day history of generalized weakness, lethargy, malaise, decreased oral intake and runny nose x 3 days. Patient's rapid covid-19 test was positive. Patient is vaccinated against covid-19 (x2). Patient found on floor of ER room on my arrival for examination for admission, noted awake, alert, trying to get up off of the floor, no apparent injuries, at baseline, pleasantly confused as per RN and PCA at bedside.     Subjective: Patient was seen and examined by me this morning at bedside, with no complaints and no acute events overnight or episodes of agression/agitation         Vital Signs Last 24 Hrs  T(C): 36.3 (29 Dec 2021 09:15), Max: 36.6 (28 Dec 2021 20:13)  T(F): 97.4 (29 Dec 2021 09:15), Max: 97.8 (28 Dec 2021 20:13)  HR: 63 (29 Dec 2021 09:15) (63 - 67)  BP: 120/54 (29 Dec 2021 09:15) (118/59 - 120/54)  BP(mean): --  RR: 18 (29 Dec 2021 09:15) (18 - 18)  SpO2: 93% (29 Dec 2021 09:15) (93% - 95%)    Constitutional: Pt lying in bed, awake and alert, NAD on room air  HEENT: EOMI, normal hearing, moist mucous membranes  Neck: Soft and supple, no JVD  Respiratory: CTABL, No wheezing, rales or rhonchi  Cardiovascular: S1S2+, RRR, no M/G/R  Gastrointestinal: BS+, soft, NT/ND, no guarding, no rebound  Extremities: No peripheral edema  Vascular: 2+ peripheral pulses  Neurological: AAOx3, no focal deficits  Musculoskeletal: 5/5 strength b/l upper and lower extremities  Skin: No rashes    MEDICATIONS:  MEDICATIONS  (STANDING):  amLODIPine   Tablet 5 milliGRAM(s) Oral daily  atorvastatin 40 milliGRAM(s) Oral at bedtime  finasteride 5 milliGRAM(s) Oral daily  heparin   Injectable 5000 Unit(s) SubCutaneous every 12 hours  sodium chloride 0.9%. 1000 milliLiter(s) (75 mL/Hr) IV Continuous <Continuous>  tamsulosin 0.4 milliGRAM(s) Oral at bedtime  timolol 0.5% Solution 1 Drop(s) Both EYES two times a day  valsartan 160 milliGRAM(s) Oral daily    MEDICATIONS  (PRN):  acetaminophen     Tablet .. 650 milliGRAM(s) Oral every 6 hours PRN Temp greater or equal to 38C (100.4F), Mild Pain (1 - 3)  aluminum hydroxide/magnesium hydroxide/simethicone Suspension 30 milliLiter(s) Oral every 4 hours PRN Dyspepsia  ondansetron Injectable 4 milliGRAM(s) IV Push every 8 hours PRN Nausea and/or Vomiting  QUEtiapine 25 milliGRAM(s) Oral at bedtime PRN agitation    LABS                        13.2   4.21  )-----------( 157      ( 29 Dec 2021 07:33 )             38.5   12-29    138  |  108  |  28<H>  ----------------------------<  115<H>  3.3<L>   |  24  |  1.16    Ca    8.8      29 Dec 2021 07:33    TPro  5.8<L>  /  Alb  2.0<L>  /  TBili  0.5  /  DBili  x   /  AST  49<H>  /  ALT  40  /  AlkPhos  57  12-29

## 2021-12-29 NOTE — PROGRESS NOTE ADULT - ASSESSMENT
89 y/o male with PMHx significant for Alzheimer's, bradycardia s/p PPM placement, HTN, HLD, diet controlled diabetes mellitus type 2, Glaucoma, BPH presented from [facility] with a one day history of generalized weakness, lethargy, malaise, decreased oral intake and runny nose x 3 days. Patient found to be positive for COVID. Patient is vaccinated against covid-19 (x2) admitted for:     #COVID 19  -PCR detected COVID on 12/21/21  -patient previously vaccinated x2   -patient currently Stable and Saturating well on RA  -continue to monitor  -Continue with 10 day quarantine  -repeat Covid PCR in AM      #RAZA 2/2 Dehydration   -Now improved s/p IVF maintenance   -Baseline Cr 0.9-1  -cr. 1.22>1.33> 1.24> 1.16 today  -will continue to monitor BMPs    #HTN  -Continue amlodipine 5mg daily,   -Continue valsartan 160 mg daily   -DASH/TLC PO DIET     #BPH  -Continue finasteride 5md daily  Continue with  tamsulosin 0.4mg qhs     #HLD  -Continue Lipitor 40mg daily   -DASH/TLC PO DIET     #Alzheimer's disease  -Seroquel qhs PRN    #Glaucoma  -Timolol eye drops     #VTE prophylaxis  -Covid-19 +  -Heparin 5000u subQ q12hrs     Dispo: Patient long-term care recipient at [facility], according to protocol patient must quarantine for 10days before acceptance. As per  Earliest D/C would be 1/1/22 as quarantine will end 12/31. Patient acceptance pending quarantine and negative Covid status will order Covid swab for tomorrow. If positive will need to find alternative placement.     case discussed with Dr. Soto

## 2021-12-30 ENCOUNTER — TRANSCRIPTION ENCOUNTER (OUTPATIENT)
Age: 86
End: 2021-12-30

## 2021-12-30 LAB — SARS-COV-2 RNA SPEC QL NAA+PROBE: DETECTED

## 2021-12-30 PROCEDURE — 99232 SBSQ HOSP IP/OBS MODERATE 35: CPT | Mod: GC

## 2021-12-30 RX ORDER — QUETIAPINE FUMARATE 200 MG/1
1 TABLET, FILM COATED ORAL
Qty: 30 | Refills: 0
Start: 2021-12-30 | End: 2022-01-28

## 2021-12-30 RX ORDER — BNT162B2 0.23 MG/2.25ML
0.3 INJECTION, SUSPENSION INTRAMUSCULAR
Qty: 0 | Refills: 0 | DISCHARGE

## 2021-12-30 RX ORDER — MICONAZOLE NITRATE 2 %
1 CREAM (GRAM) TOPICAL
Qty: 0 | Refills: 0 | DISCHARGE

## 2021-12-30 RX ADMIN — FINASTERIDE 5 MILLIGRAM(S): 5 TABLET, FILM COATED ORAL at 11:52

## 2021-12-30 RX ADMIN — Medication 1 DROP(S): at 22:20

## 2021-12-30 RX ADMIN — HEPARIN SODIUM 5000 UNIT(S): 5000 INJECTION INTRAVENOUS; SUBCUTANEOUS at 11:52

## 2021-12-30 RX ADMIN — AMLODIPINE BESYLATE 5 MILLIGRAM(S): 2.5 TABLET ORAL at 11:52

## 2021-12-30 RX ADMIN — VALSARTAN 160 MILLIGRAM(S): 80 TABLET ORAL at 11:52

## 2021-12-30 RX ADMIN — QUETIAPINE FUMARATE 25 MILLIGRAM(S): 200 TABLET, FILM COATED ORAL at 22:19

## 2021-12-30 RX ADMIN — ATORVASTATIN CALCIUM 40 MILLIGRAM(S): 80 TABLET, FILM COATED ORAL at 22:19

## 2021-12-30 RX ADMIN — HEPARIN SODIUM 5000 UNIT(S): 5000 INJECTION INTRAVENOUS; SUBCUTANEOUS at 22:19

## 2021-12-30 NOTE — DISCHARGE NOTE PROVIDER - HOSPITAL COURSE
Patient is a 89yo man with pmhx significant for Alzhemiers, bradycardia s/p PPM, HTN, HLD DM2-deit controlled Glaucoma and BPH who presented to [facility] on 12/21/2021 with complaints of weakness and anorexia. Patient had one day history of generalized weakness, lethargy, malaise, decreased oral intake and runny nose x 3 days. Patient originally from [facility],  on arrival to the ED patient was  he was found to be Covid+ ON 12/21/21 after rapid covid test. patient was previously vaccinated against covid receiving two doses of the vaccine. patient did not have any respiratory symptoms at the time and was saturating well on room air. During his hospitalization patient did not require any supplemental oxygen. Patient was also found to have an acute kidney injury, lab results showed an increase in his creatinine which was likely in the setting of dehydration. patient was given 1L bolus in the ED and nephrotoxic drugs were avoided during his stay. While in the ED patient was found seated on the floor, as per protocol CT head which showed a stable exam, no acute intracranial hemmorhage, vasogenic edmena, extra-axial collection or calvarial fracture. CT head and Pelvic xray were performed which did not showed any acute pathology or concerns for bleeding. Patient is currently able to move all of his extremities without difficulty. During his hospitalization patient was placed on Seroquel for agitation which he received at bedtime daily. Patient was seen any evaluated at bedside today with no complaints and doing well. He continues to not require supplemental oxygen and has faired well on nightly Seroquel.       REVIEW OF SYSTEMS:  CONSTITUTIONAL: No weakness, fevers or chills  EYES/ENT: No visual changes;  No vertigo or throat pain   NECK: No pain or stiffness  RESPIRATORY: No cough, wheezing, hemoptysis; No shortness of breath  CARDIOVASCULAR: No chest pain or palpitations  GASTROINTESTINAL: No abdominal or epigastric pain. No nausea, vomiting; No diarrhea or constipation.   GENITOURINARY: No dysuria, frequency or hematuria  NEUROLOGICAL: No numbness or weakness  SKIN: No itching, burning, rashes, or lesions     PHYSICAL EXAM:  Vital Signs Last 24 Hrs  T(C): 36.3 (30 Dec 2021 08:40), Max: 36.7 (29 Dec 2021 19:03)  T(F): 97.4 (30 Dec 2021 08:40), Max: 98 (29 Dec 2021 19:03)  HR: 68 (30 Dec 2021 08:40) (68 - 71)  BP: 130/61 (30 Dec 2021 08:40) (127/55 - 130/63)  BP(mean): --  RR: 18 (30 Dec 2021 08:40) (18 - 18)  SpO2: 96% (30 Dec 2021 08:40) (96% - 100%)    Constitutional: Pt lying in bed, awake and alert, NAD  HEENT: EOMI, normal hearing, moist mucous membranes  Neck: Soft and supple, no JVD  Respiratory: CTABL, No wheezing, rales or rhonchi  Cardiovascular: S1S2+, RRR, no M/G/R  Gastrointestinal: BS+, soft, NT/ND, no guarding, no rebound  Extremities: No peripheral edema  Vascular: 2+ peripheral pulses  Neurological: AAOx1, no focal deficits  Musculoskeletal: 5/5 strength b/l upper and lower extremities  Skin: No rashes     Patient is a 89yo man with pmhx significant for Alzhemiers, bradycardia s/p PPM, HTN, HLD DM2-diet controlled, Glaucoma and BPH who presented to [facility] on 12/21/2021 with complaints of weakness and anorexia. Patient had one day history of generalized weakness, lethargy, malaise, decreased oral intake and runny nose x 3 days. Patient originally from [facility], on arrival to the ED patient was  he was found to be Covid+ on 12/21/21 after rapid Covid test. Patient was previously vaccinated against Covid receiving two doses of the vaccine. patient did not have any respiratory symptoms at the time and was saturating well on room air. During his hospitalization patient did not require any supplemental oxygen. Patient was also found to have an acute kidney injury, lab results showed an increase in his creatinine which was likely in the setting of dehydration. patient was given 1L bolus in the ED and nephrotoxic drugs were avoided during his stay. While in the ED patient was found seated on the floor, as per protocol CT head which showed a stable exam, no acute intracranial hemmorhage, vasogenic edmena, extra-axial collection or calvarial fracture. CT head and Pelvic xray were performed which did not showed any acute pathology or concerns for bleeding. Patient is currently able to move all of his extremities without difficulty. During his hospitalization patient was placed on Seroquel for agitation which he received at bedtime daily. Patient was seen any evaluated at bedside today with no complaints and doing well. He continues to not require supplemental oxygen and has faired well on nightly Seroquel. Repeat Covid PCR +on 12/30 however patient continues to be asymptomatic. Patient seen and evaluated on day of discharge with no complaints of SOB, BUTLER, chest pain, N/V. Patient is medically optimized for Discharge and will be returning to [facility]. Patient will need to follow up with his PCP in 1-2 weeks, for evaluation and further management of care.     REVIEW OF SYSTEMS:  CONSTITUTIONAL: No weakness, fevers or chills  EYES/ENT: No visual changes;  No vertigo or throat pain   NECK: No pain or stiffness  RESPIRATORY: No cough, wheezing, hemoptysis; No shortness of breath  CARDIOVASCULAR: No chest pain or palpitations  GASTROINTESTINAL: No abdominal or epigastric pain. No nausea, vomiting; No diarrhea or constipation.   GENITOURINARY: No dysuria, frequency or hematuria  NEUROLOGICAL: No numbness or weakness  SKIN: No itching, burning, rashes, or lesions     Vital Signs Last 24 Hrs  T(C): 36.6 (31 Dec 2021 08:13), Max: 36.6 (31 Dec 2021 08:13)  T(F): 97.9 (31 Dec 2021 08:13), Max: 97.9 (31 Dec 2021 08:13)  HR: 67 (31 Dec 2021 08:13) (67 - 71)  BP: 131/59 (31 Dec 2021 08:13) (130/61 - 149/63)  BP(mean): --  RR: 18 (31 Dec 2021 08:13) (18 - 18)  SpO2: 94% (31 Dec 2021 08:13) (90% - 96%)    Constitutional: Pt lying in bed, awake and alert, NAD  HEENT: EOMI, normal hearing, moist mucous membranes  Neck: Soft and supple, no JVD  Respiratory: CTABL, No wheezing, rales or rhonchi  Cardiovascular: S1S2+, RRR, no M/G/R  Gastrointestinal: BS+, soft, NT/ND, no guarding, no rebound  Extremities: No peripheral edema  Vascular: 2+ peripheral pulses  Neurological: AAOx1, no focal deficits  Musculoskeletal: 5/5 strength b/l upper and lower extremities  Skin: No rashes

## 2021-12-30 NOTE — PROGRESS NOTE ADULT - ATTENDING COMMENTS
-rs-aeeb, cta  -p/a-soft, bs+  -cvs-s1s2 normal     A/P    #d/c plan for friday
89 y/o male with PMHx significant for Alzheimer's, bradycardia s/p PPM placement, HTN, HLD, diet controlled diabetes mellitus type 2, Glaucoma, BPH presented from [facility] with a one day history of generalized weakness, lethargy, malaise, decreased oral intake and runny nose x 3 days. Patient found to be positive for COVID. Patient is vaccinated against covid-19 (x2) admitted for:     #COVID 19  -PCR detected COVID on 12/21/21  -patient previously vaccinated x2   -patient currently Stable and Saturating well on RA  -continue to monitor  -patient accepted back to [facility] regardless of current covid status; patient to go tomorrow   -repeat Covid PCR pending and will follow up
As above, pt seen and examined with house staff and treatment plan formulated on rounds    See Above and Attending Note
89 y/o male with PMHx significant for Alzheimer's, bradycardia s/p PPM placement, HTN, HLD, diet controlled diabetes mellitus type 2, Glaucoma, BPH presented from [facility] with a one day history of generalized weakness, lethargy, malaise, decreased oral intake and runny nose x 3 days.     #COVID 19  -PCR detected COVID on 12/21/21  -patient previously vaccinated x2   -patient currently Stable and Saturating well on RA  -continue to monitor  -Continue with 10 day quarantine  -repeat Covid PCR in AM

## 2021-12-30 NOTE — DISCHARGE NOTE PROVIDER - NSDCCPCAREPLAN_GEN_ALL_CORE_FT
PRINCIPAL DISCHARGE DIAGNOSIS  Diagnosis: COVID-19  Assessment and Plan of Treatment: You have tested POSITIVE for the novel coronavirus (COVID-19). Upon discharge, you will not need to quarantine as you have complaeted 10 days of quarantine during your hospital stay without any serve sypmtoms. Please wear a face mask if you are around other individuals.  Please follow up with your primary care physician within 2-3 weeks of your discharge from the hospital. Please take all medications as prescribed. If you experience any worsening or recurrence of your symptoms, particularly worsening or high fever, shortness of breathe, extreme fatigue, or bloody cough please call [phone] immediately or report to the nearest Emergency Department.      SECONDARY DISCHARGE DIAGNOSES  Diagnosis: Lethargy  Assessment and Plan of Treatment:     Diagnosis: Dehydration  Assessment and Plan of Treatment: you were found to be dehydrated during your admission to the hospital, this cause your kidney to feel stressed. it is important that you continue to stay hydrated after you are discharged from the hospital.    Diagnosis: RAZA (acute kidney injury)  Assessment and Plan of Treatment: You were found to have elevated blood creatinine levels during your admission which signifies transient injury to the kidneys likely due to dehydration. You were given intravenous fluids and your kidney function improved. Please continue to drink plenty of fluids follow up with your PCP to check routine labs following discharge from the hospital.

## 2021-12-30 NOTE — DISCHARGE NOTE PROVIDER - NSDCMRMEDTOKEN_GEN_ALL_CORE_FT
acetaminophen 500 mg oral tablet: 1 tab(s) orally every 4 hours, As Needed for minor pain/ temp &gt; 101  amLODIPine 5 mg oral tablet: 1 tab(s) orally once a day  atorvastatin 40 mg oral tablet: 1 tab(s) orally once a day (at bedtime)  finasteride 5 mg oral tablet: 1 tab(s) orally once a day  Florastor 250 mg oral capsule: 1 cap(s) orally 2 times a day  furosemide 20 mg oral tablet: 1 tab(s) orally every other day  ***hold for SBP &lt; 100***  Glucagon Emergency Kit for Low Blood Sugar 1 mg injection: Use as directed   Maxitrol ophthalmic ointment: Instill 0.5 inch in right eye at bedtime x 14 days  MiraLax oral powder for reconstitution: 17 gram(s) orally once a day  tamsulosin 0.4 mg oral capsule: 1 cap(s) orally once a day (at bedtime)  timolol maleate 0.5% ophthalmic solution: 1 drop(s) to each affected eye 2 times a day  valsartan 160 mg oral tablet: 1 tab(s) orally once a day  ***hold for SBP &lt; 100***   acetaminophen 500 mg oral tablet: 1 tab(s) orally every 4 hours, As Needed for minor pain/ temp &gt; 101  amLODIPine 5 mg oral tablet: 1 tab(s) orally once a day  atorvastatin 40 mg oral tablet: 1 tab(s) orally once a day (at bedtime)  finasteride 5 mg oral tablet: 1 tab(s) orally once a day  Florastor 250 mg oral capsule: 1 cap(s) orally 2 times a day  furosemide 20 mg oral tablet: 1 tab(s) orally every other day  ***hold for SBP &lt; 100***  Glucagon Emergency Kit for Low Blood Sugar 1 mg injection: Use as directed   Maxitrol ophthalmic ointment: Instill 0.5 inch in right eye at bedtime x 14 days  MiraLax oral powder for reconstitution: 17 gram(s) orally once a day  QUEtiapine 25 mg oral tablet: 1 tab(s) orally once a day (at bedtime), As needed for agitation  tamsulosin 0.4 mg oral capsule: 1 cap(s) orally once a day (at bedtime)  timolol maleate 0.5% ophthalmic solution: 1 drop(s) to each affected eye 2 times a day  valsartan 160 mg oral tablet: 1 tab(s) orally once a day  ***hold for SBP &lt; 100***

## 2021-12-30 NOTE — DISCHARGE NOTE PROVIDER - NSDCCAREPROVSEEN_GEN_ALL_CORE_FT
Jermaine, Clifford Mata, Bobbi Matias, Quyen Monte, Sathya Grullon, Glenna Abreu, Prasanna Alicea, Steven Ribeiro, Maki Soto, Terrence Wallace, Kelly Mason, Nick Sandra, Vaughn

## 2021-12-30 NOTE — PROGRESS NOTE ADULT - PROVIDER SPECIALTY LIST ADULT
Family Medicine
Hospitalist
Hospitalist
Family Medicine
Hospitalist
Hospitalist
Family Medicine

## 2021-12-30 NOTE — PROGRESS NOTE ADULT - SUBJECTIVE AND OBJECTIVE BOX
89 y/o male with PMHx significant for Alzheimer's, bradycardia s/p PPM placement, HTN, HLD, diet controlled diabetes mellitus type 2, Glaucoma, BPH presented to HNT from [facility] with a one day history of generalized weakness, lethargy, malaise, decreased oral intake and runny nose x 3 days. Patient's rapid covid-19 test was positive. Patient is vaccinated against covid-19 (x2). Patient found on floor of ER room on my arrival for examination for admission, noted awake, alert, trying to get up off of the floor, no apparent injuries, at baseline, pleasantly confused as per RN and PCA at bedside.     Subjective: Patient was seen and examined by me this morning at bedside, with no complaints and no acute events overnight or episodes of agression/agitation     Vital Signs Last 24 Hrs  T(C): 36.3 (30 Dec 2021 08:40), Max: 36.7 (29 Dec 2021 19:03)  T(F): 97.4 (30 Dec 2021 08:40), Max: 98 (29 Dec 2021 19:03)  HR: 68 (30 Dec 2021 08:40) (68 - 71)  BP: 130/61 (30 Dec 2021 08:40) (127/55 - 130/63)  BP(mean): --  RR: 18 (30 Dec 2021 08:40) (18 - 18)  SpO2: 96% (30 Dec 2021 08:40) (96% - 100%)    Constitutional: Pt lying in bed, awake and alert, NAD on room air  HEENT: EOMI, normal hearing, moist mucous membranes  Respiratory: CTABL, No wheezing, rales or rhonchi  Cardiovascular: S1S2+, RRR, no M/G/R  Gastrointestinal: BS+, soft, NT/ND, no guarding, no rebound  Extremities: No peripheral edema  Vascular: 2+ peripheral pulses  Neurological: AAOx1, no focal deficits  Musculoskeletal: 5/5 strength b/l upper and lower extremities  Skin: No rashes    MEDICATIONS:  MEDICATIONS  (STANDING):  amLODIPine   Tablet 5 milliGRAM(s) Oral daily  atorvastatin 40 milliGRAM(s) Oral at bedtime  finasteride 5 milliGRAM(s) Oral daily  heparin   Injectable 5000 Unit(s) SubCutaneous every 12 hours  sodium chloride 0.9%. 1000 milliLiter(s) (75 mL/Hr) IV Continuous <Continuous>  tamsulosin 0.4 milliGRAM(s) Oral at bedtime  timolol 0.5% Solution 1 Drop(s) Both EYES two times a day  valsartan 160 milliGRAM(s) Oral daily    MEDICATIONS  (PRN):  acetaminophen     Tablet .. 650 milliGRAM(s) Oral every 6 hours PRN Temp greater or equal to 38C (100.4F), Mild Pain (1 - 3)  aluminum hydroxide/magnesium hydroxide/simethicone Suspension 30 milliLiter(s) Oral every 4 hours PRN Dyspepsia  ondansetron Injectable 4 milliGRAM(s) IV Push every 8 hours PRN Nausea and/or Vomiting  QUEtiapine 25 milliGRAM(s) Oral at bedtime PRN agitation    LABS                         13.2   4.21  )-----------( 157      ( 29 Dec 2021 07:33 )             38.5   12-29    138  |  108  |  28<H>  ----------------------------<  115<H>  3.3<L>   |  24  |  1.16    Ca    8.8      29 Dec 2021 07:33    TPro  5.8<L>  /  Alb  2.0<L>  /  TBili  0.5  /  DBili  x   /  AST  49<H>  /  ALT  40  /  AlkPhos  57  12-29

## 2021-12-30 NOTE — DISCHARGE NOTE PROVIDER - CARE PROVIDER_API CALL
KELLY DAMIAN  Internal Medicine  [address] [place] [address]  Phone: [phone]  Fax: ([phone]  Established Patient  Follow Up Time: 2 weeks

## 2021-12-30 NOTE — PROGRESS NOTE ADULT - ASSESSMENT
89 y/o male with PMHx significant for Alzheimer's, bradycardia s/p PPM placement, HTN, HLD, diet controlled diabetes mellitus type 2, Glaucoma, BPH presented from [facility] with a one day history of generalized weakness, lethargy, malaise, decreased oral intake and runny nose x 3 days. Patient found to be positive for COVID. Patient is vaccinated against covid-19 (x2) admitted for:     #COVID 19  -PCR detected COVID on 12/21/21  -patient previously vaccinated x2   -patient currently Stable and Saturating well on RA  -continue to monitor  -patient accepted back to [facility] regardless of current covid status; patient to go tomorrow   -repeat Covid PCR pending and will follow up     #RAZA 2/2 Dehydration   -Now improved s/p IVF maintenance   -Baseline Cr 0.9-1  -cr. 1.22>1.33> 1.24> 1.16 today  -will continue to monitor BMPs    #HTN  -Continue amlodipine 5mg daily,   -Continue valsartan 160 mg daily   -DASH/TLC PO DIET     #BPH  -Continue finasteride 5md daily  Continue with  tamsulosin 0.4mg qhs     #HLD  -Continue Lipitor 40mg daily   -DASH/TLC PO DIET     #Alzheimer's disease  -Seroquel qhs PRN  -Patient benefiting from night time dose, will plan to continue outpatient as well.    #Glaucoma  -Timolol eye drops     #VTE prophylaxis  -Covid-19 +  -Heparin 5000u subQ q12hrs     Dispo: Discharge to [facility] tomorrow morning regardless of Covid results planned and confirmed. Patient unable to leave today due to protocol for 24hrs without posey and pending covid results.  family updated and amenable to above plan.    Case discussed with Dr. Soto

## 2021-12-30 NOTE — PROVIDER CONTACT NOTE (OTHER) - SITUATION
notified Dr Veloz's office of admission please fax over discharge paperwork to [phone] once the patient is discharged
pt reporting chest pain during initial assessment at start of shift

## 2021-12-31 ENCOUNTER — TRANSCRIPTION ENCOUNTER (OUTPATIENT)
Age: 86
End: 2021-12-31

## 2021-12-31 VITALS — WEIGHT: 134.48 LBS

## 2021-12-31 PROCEDURE — 99238 HOSP IP/OBS DSCHRG MGMT 30/<: CPT

## 2021-12-31 RX ORDER — QUETIAPINE FUMARATE 200 MG/1
1 TABLET, FILM COATED ORAL
Qty: 10 | Refills: 0
Start: 2021-12-31 | End: 2022-01-09

## 2021-12-31 RX ORDER — QUETIAPINE FUMARATE 200 MG/1
1 TABLET, FILM COATED ORAL
Qty: 20 | Refills: 0
Start: 2021-12-31 | End: 2022-01-19

## 2021-12-31 RX ADMIN — Medication 1 DROP(S): at 09:30

## 2021-12-31 RX ADMIN — FINASTERIDE 5 MILLIGRAM(S): 5 TABLET, FILM COATED ORAL at 09:29

## 2021-12-31 RX ADMIN — TAMSULOSIN HYDROCHLORIDE 0.4 MILLIGRAM(S): 0.4 CAPSULE ORAL at 05:11

## 2021-12-31 RX ADMIN — VALSARTAN 160 MILLIGRAM(S): 80 TABLET ORAL at 09:29

## 2021-12-31 RX ADMIN — AMLODIPINE BESYLATE 5 MILLIGRAM(S): 2.5 TABLET ORAL at 09:29

## 2021-12-31 RX ADMIN — HEPARIN SODIUM 5000 UNIT(S): 5000 INJECTION INTRAVENOUS; SUBCUTANEOUS at 09:29

## 2021-12-31 NOTE — DIETITIAN INITIAL EVALUATION ADULT. - ADD RECOMMEND
Record PO intake in EMR after each meal (nursing.) Encourage PO intake.  Supplement as above. Monitor PO intake, tolerance, labs and weight.

## 2021-12-31 NOTE — DIETITIAN INITIAL EVALUATION ADULT. - OTHER INFO
89 y/o male with PMHx significant for Alzheimer's, bradycardia s/p PPM placement, HTN, HLD, diet controlled diabetes mellitus type 2, Glaucoma, BPH presented today from [facility] with a one day history of generalized weakness, lethargy, malaise, decreased oral intake and runny nose x 3 days. Patient's rapid covid-19 test was positive. Patient is vaccinated against covid-19 (x2).     Patient found on floor of ER room on my arrival for examination for admission, noted awake, alert, trying to get up off of the floor, no apparent injuries, at baseline, pleasantly confused as per RN and PCA at bedside. 89 y/o male with PMHx significant for Alzheimer's, bradycardia s/p PPM placement, HTN, HLD, diet controlled diabetes mellitus type 2, Glaucoma, BPH presented today from [facility] with a one day history of generalized weakness, lethargy, malaise, decreased oral intake and runny nose x 3 days. Patient's rapid covid-19 test was positive. Patient is vaccinated against covid-19 (x2).     Patient found on floor of ER room on my arrival for examination for admission, noted awake, alert, trying to get up off of the floor, no apparent injuries, at baseline, pleasantly confused as per RN and PCA at bedside.  Visited with pt at bedside.  Nursing reports PO intake is fair.  Pt is expected to be discharged back to [facility] shortly.

## 2021-12-31 NOTE — DIETITIAN INITIAL EVALUATION ADULT. - PERTINENT MEDS FT
COVID Treatment Plan:    #Viral Pneumonia secondary to Novel Coronavirus Infection    - Maintain on airborne isolation.    - Continue with O2 as needed via nasal cannula and up-titrate as needed. If on non-rebreather mask, start continuous oximetry monitoring.    - Obtain daily room air O2 saturations once O2 requirements stabilize.    - Start hydroxychloroquine, with plan for 5 day course. EKG reviewed on admission and QTc < 500 msec. No need for further cardiac monitoring.    - Acetaminophen 650 mg PO q4h PRN fever. Limit use of NSAIDs.    - HFA albuterol Q6 hour PRN via MDI. Would avoid nebulized preparations to limit risk of aerosol formation.    - Defer systemic steroids/interleukin inhibitor at this time; would consider if inflammatory markers are elevated and patient has worsening hypoxia.    - Labs Testing: Daily or As Needed: CBC w/ diff, CMP; Every 3 days: CRP, Ferritin, Procalcitonin.    - Start pharmacologic DVT PPx: Lovenox 40 mg SQ daily if BMI < 30; Lovenox 40 mg SQ BID if BMI > 30. If CrCl < 15, use heparin. Will consider need for extended prophylaxis prior to discharge based on D-Dimer and calculated VTE risk.    - Goals of Care discussion had with patient/surrogate: (free text here) MEDICATIONS  (STANDING):  amLODIPine   Tablet 5 milliGRAM(s) Oral daily  atorvastatin 40 milliGRAM(s) Oral at bedtime  finasteride 5 milliGRAM(s) Oral daily  heparin   Injectable 5000 Unit(s) SubCutaneous every 12 hours  tamsulosin 0.4 milliGRAM(s) Oral at bedtime  timolol 0.5% Solution 1 Drop(s) Both EYES two times a day  valsartan 160 milliGRAM(s) Oral daily    MEDICATIONS  (PRN):  acetaminophen     Tablet .. 650 milliGRAM(s) Oral every 6 hours PRN Temp greater or equal to 38C (100.4F), Mild Pain (1 - 3)  aluminum hydroxide/magnesium hydroxide/simethicone Suspension 30 milliLiter(s) Oral every 4 hours PRN Dyspepsia  guaiFENesin Oral Liquid (Sugar-Free) 200 milliGRAM(s) Oral every 6 hours PRN Cough  ondansetron Injectable 4 milliGRAM(s) IV Push every 8 hours PRN Nausea and/or Vomiting  QUEtiapine 25 milliGRAM(s) Oral at bedtime PRN agitation

## 2021-12-31 NOTE — DISCHARGE NOTE NURSING/CASE MANAGEMENT/SOCIAL WORK - PATIENT PORTAL LINK FT
You can access the FollowMyHealth Patient Portal offered by [facility] by registering at the following website: [email or web address]. By joining [email or web address]’s FollowMyHealth portal, you will also be able to view your health information using other applications (apps) compatible with our system.

## 2021-12-31 NOTE — DIETITIAN NUTRITION RISK NOTIFICATION - ADDITIONAL COMMENTS/DIETITIAN RECOMMENDATIONS
Change diet to regular to maximize intake  Record PO intake in EMR after each meal (nursing.)   Add Ensure Enlive 8 oz tid  Encourage PO intake.   Weekly weight  Monitor PO intake, tolerance, labs and weight.

## 2021-12-31 NOTE — DISCHARGE NOTE NURSING/CASE MANAGEMENT/SOCIAL WORK - NSDCPEFALRISK_GEN_ALL_CORE
For information on Fall & Injury Prevention, visit: [email or web address] OR  [email or web address] OR  https://www.cdc.gov/steadi/patient.html

## 2021-12-31 NOTE — DIETITIAN INITIAL EVALUATION ADULT. - NAME AND PHONE
Sara Harmon RDN, CDN, [facility]      [phone]   [email or web address]

## 2022-01-10 DIAGNOSIS — G93.41 METABOLIC ENCEPHALOPATHY: ICD-10-CM

## 2022-01-10 DIAGNOSIS — N40.0 BENIGN PROSTATIC HYPERPLASIA WITHOUT LOWER URINARY TRACT SYMPTOMS: ICD-10-CM

## 2022-01-10 DIAGNOSIS — I10 ESSENTIAL (PRIMARY) HYPERTENSION: ICD-10-CM

## 2022-01-10 DIAGNOSIS — E86.0 DEHYDRATION: ICD-10-CM

## 2022-01-10 DIAGNOSIS — U07.1 COVID-19: ICD-10-CM

## 2022-01-10 DIAGNOSIS — E11.9 TYPE 2 DIABETES MELLITUS WITHOUT COMPLICATIONS: ICD-10-CM

## 2022-01-10 DIAGNOSIS — E78.5 HYPERLIPIDEMIA, UNSPECIFIED: ICD-10-CM

## 2022-01-10 DIAGNOSIS — Z95.0 PRESENCE OF CARDIAC PACEMAKER: ICD-10-CM

## 2022-01-10 DIAGNOSIS — Z66 DO NOT RESUSCITATE: ICD-10-CM

## 2022-01-10 DIAGNOSIS — H40.9 UNSPECIFIED GLAUCOMA: ICD-10-CM

## 2022-01-10 DIAGNOSIS — Z88.1 ALLERGY STATUS TO OTHER ANTIBIOTIC AGENTS STATUS: ICD-10-CM

## 2022-01-10 DIAGNOSIS — Z88.0 ALLERGY STATUS TO PENICILLIN: ICD-10-CM

## 2022-01-10 DIAGNOSIS — G30.9 ALZHEIMER'S DISEASE, UNSPECIFIED: ICD-10-CM

## 2022-01-10 DIAGNOSIS — N17.9 ACUTE KIDNEY FAILURE, UNSPECIFIED: ICD-10-CM

## 2022-01-10 DIAGNOSIS — F02.80 DEMENTIA IN OTHER DISEASES CLASSIFIED ELSEWHERE, UNSPECIFIED SEVERITY, WITHOUT BEHAVIORAL DISTURBANCE, PSYCHOTIC DISTURBANCE, MOOD DISTURBANCE, AND ANXIETY: ICD-10-CM

## 2022-02-07 ENCOUNTER — APPOINTMENT (OUTPATIENT)
Dept: ELECTROPHYSIOLOGY | Facility: CLINIC | Age: 87
End: 2022-02-07
Payer: MEDICARE

## 2022-02-08 ENCOUNTER — NON-APPOINTMENT (OUTPATIENT)
Age: 87
End: 2022-02-08

## 2022-02-08 PROCEDURE — 93296 REM INTERROG EVL PM/IDS: CPT

## 2022-02-08 PROCEDURE — 93294 REM INTERROG EVL PM/LDLS PM: CPT

## 2022-05-10 ENCOUNTER — APPOINTMENT (OUTPATIENT)
Dept: ELECTROPHYSIOLOGY | Facility: CLINIC | Age: 87
End: 2022-05-10

## 2022-05-17 ENCOUNTER — NON-APPOINTMENT (OUTPATIENT)
Age: 87
End: 2022-05-17

## 2022-05-17 ENCOUNTER — APPOINTMENT (OUTPATIENT)
Dept: ELECTROPHYSIOLOGY | Facility: CLINIC | Age: 87
End: 2022-05-17
Payer: MEDICARE

## 2022-05-17 PROCEDURE — 93294 REM INTERROG EVL PM/LDLS PM: CPT

## 2022-05-17 PROCEDURE — 93296 REM INTERROG EVL PM/IDS: CPT

## 2022-05-31 NOTE — PROGRESS NOTE ADULT - REASON FOR ADMISSION
"Contacted pts mother for follow up today. Mom apologized for not replying but stated, \"we doubled her zyrtec and her skin is completely clear.\" RN verbalized understanding, inquired if mom had other skin concerns at this time. Mom denied Will close encounter at this time.   "
Covid-19 infection
Covid-19 infection
[employer] message routed to Dr Fontaine to advise. Pt last seen Feb 2022   
Covid-19 infection

## 2022-06-13 ENCOUNTER — INPATIENT (INPATIENT)
Facility: HOSPITAL | Age: 87
LOS: 2 days | Discharge: NURSING, REHAB OR LONG-TERM CARE | DRG: 389 | End: 2022-06-16
Attending: INTERNAL MEDICINE | Admitting: STUDENT IN AN ORGANIZED HEALTH CARE EDUCATION/TRAINING PROGRAM
Payer: MEDICARE

## 2022-06-13 VITALS
SYSTOLIC BLOOD PRESSURE: 160 MMHG | DIASTOLIC BLOOD PRESSURE: 96 MMHG | WEIGHT: 130.07 LBS | HEART RATE: 91 BPM | RESPIRATION RATE: 18 BRPM | TEMPERATURE: 98 F | HEIGHT: 64 IN

## 2022-06-13 DIAGNOSIS — Z98.890 OTHER SPECIFIED POSTPROCEDURAL STATES: Chronic | ICD-10-CM

## 2022-06-13 PROCEDURE — 99285 EMERGENCY DEPT VISIT HI MDM: CPT

## 2022-06-13 RX ORDER — ONDANSETRON 8 MG/1
4 TABLET, FILM COATED ORAL ONCE
Refills: 0 | Status: COMPLETED | OUTPATIENT
Start: 2022-06-13 | End: 2022-06-14

## 2022-06-13 RX ORDER — PANTOPRAZOLE SODIUM 20 MG/1
40 TABLET, DELAYED RELEASE ORAL ONCE
Refills: 0 | Status: COMPLETED | OUTPATIENT
Start: 2022-06-13 | End: 2022-06-13

## 2022-06-13 NOTE — ED ADULT TRIAGE NOTE - CHIEF COMPLAINT QUOTE
Pt BIBEMS from [facility], c/o coffee ground emesis starting at 6am today. - anticoagulant use. Denies dark/bloody stools, chest pain, SOB, abd. pain, urinary symptoms, and fever. Arrives with MOLST in chart.

## 2022-06-14 DIAGNOSIS — K92.2 GASTROINTESTINAL HEMORRHAGE, UNSPECIFIED: ICD-10-CM

## 2022-06-14 LAB
ALBUMIN SERPL ELPH-MCNC: 3.5 G/DL — SIGNIFICANT CHANGE UP (ref 3.3–5)
ALP SERPL-CCNC: 142 U/L — HIGH (ref 40–120)
ALT FLD-CCNC: 19 U/L — SIGNIFICANT CHANGE UP (ref 12–78)
ANION GAP SERPL CALC-SCNC: 7 MMOL/L — SIGNIFICANT CHANGE UP (ref 5–17)
ANION GAP SERPL CALC-SCNC: 9 MMOL/L — SIGNIFICANT CHANGE UP (ref 5–17)
APTT BLD: 29.6 SEC — SIGNIFICANT CHANGE UP (ref 27.5–35.5)
AST SERPL-CCNC: 28 U/L — SIGNIFICANT CHANGE UP (ref 15–37)
BASOPHILS # BLD AUTO: 0.01 K/UL — SIGNIFICANT CHANGE UP (ref 0–0.2)
BASOPHILS NFR BLD AUTO: 0.1 % — SIGNIFICANT CHANGE UP (ref 0–2)
BILIRUB SERPL-MCNC: 0.5 MG/DL — SIGNIFICANT CHANGE UP (ref 0.2–1.2)
BUN SERPL-MCNC: 28 MG/DL — HIGH (ref 7–23)
BUN SERPL-MCNC: 33 MG/DL — HIGH (ref 7–23)
CALCIUM SERPL-MCNC: 10.8 MG/DL — HIGH (ref 8.5–10.1)
CALCIUM SERPL-MCNC: 11.2 MG/DL — HIGH (ref 8.5–10.1)
CHLORIDE SERPL-SCNC: 101 MMOL/L — SIGNIFICANT CHANGE UP (ref 96–108)
CHLORIDE SERPL-SCNC: 105 MMOL/L — SIGNIFICANT CHANGE UP (ref 96–108)
CO2 SERPL-SCNC: 29 MMOL/L — SIGNIFICANT CHANGE UP (ref 22–31)
CO2 SERPL-SCNC: 29 MMOL/L — SIGNIFICANT CHANGE UP (ref 22–31)
CREAT SERPL-MCNC: 1.49 MG/DL — HIGH (ref 0.5–1.3)
CREAT SERPL-MCNC: 1.63 MG/DL — HIGH (ref 0.5–1.3)
EGFR: 39 ML/MIN/1.73M2 — LOW
EGFR: 43 ML/MIN/1.73M2 — LOW
EOSINOPHIL # BLD AUTO: 0 K/UL — SIGNIFICANT CHANGE UP (ref 0–0.5)
EOSINOPHIL NFR BLD AUTO: 0 % — SIGNIFICANT CHANGE UP (ref 0–6)
FLUAV AG NPH QL: SIGNIFICANT CHANGE UP
FLUBV AG NPH QL: SIGNIFICANT CHANGE UP
GLUCOSE SERPL-MCNC: 163 MG/DL — HIGH (ref 70–99)
GLUCOSE SERPL-MCNC: 196 MG/DL — HIGH (ref 70–99)
HCT VFR BLD CALC: 41.3 % — SIGNIFICANT CHANGE UP (ref 39–50)
HCT VFR BLD CALC: 43.5 % — SIGNIFICANT CHANGE UP (ref 39–50)
HGB BLD-MCNC: 13.8 G/DL — SIGNIFICANT CHANGE UP (ref 13–17)
HGB BLD-MCNC: 14.5 G/DL — SIGNIFICANT CHANGE UP (ref 13–17)
IMM GRANULOCYTES NFR BLD AUTO: 0.1 % — SIGNIFICANT CHANGE UP (ref 0–1.5)
INR BLD: 1.08 RATIO — SIGNIFICANT CHANGE UP (ref 0.88–1.16)
LACTATE SERPL-SCNC: 3 MMOL/L — HIGH (ref 0.7–2)
LIDOCAIN IGE QN: 1114 U/L — HIGH (ref 73–393)
LYMPHOCYTES # BLD AUTO: 0.53 K/UL — LOW (ref 1–3.3)
LYMPHOCYTES # BLD AUTO: 7 % — LOW (ref 13–44)
MCHC RBC-ENTMCNC: 31.9 PG — SIGNIFICANT CHANGE UP (ref 27–34)
MCHC RBC-ENTMCNC: 32.2 PG — SIGNIFICANT CHANGE UP (ref 27–34)
MCHC RBC-ENTMCNC: 33.3 GM/DL — SIGNIFICANT CHANGE UP (ref 32–36)
MCHC RBC-ENTMCNC: 33.4 GM/DL — SIGNIFICANT CHANGE UP (ref 32–36)
MCV RBC AUTO: 95.6 FL — SIGNIFICANT CHANGE UP (ref 80–100)
MCV RBC AUTO: 96.5 FL — SIGNIFICANT CHANGE UP (ref 80–100)
MONOCYTES # BLD AUTO: 0.29 K/UL — SIGNIFICANT CHANGE UP (ref 0–0.9)
MONOCYTES NFR BLD AUTO: 3.8 % — SIGNIFICANT CHANGE UP (ref 2–14)
NEUTROPHILS # BLD AUTO: 6.73 K/UL — SIGNIFICANT CHANGE UP (ref 1.8–7.4)
NEUTROPHILS NFR BLD AUTO: 89 % — HIGH (ref 43–77)
PLATELET # BLD AUTO: 194 K/UL — SIGNIFICANT CHANGE UP (ref 150–400)
PLATELET # BLD AUTO: 216 K/UL — SIGNIFICANT CHANGE UP (ref 150–400)
POTASSIUM SERPL-MCNC: 3.5 MMOL/L — SIGNIFICANT CHANGE UP (ref 3.5–5.3)
POTASSIUM SERPL-MCNC: 3.8 MMOL/L — SIGNIFICANT CHANGE UP (ref 3.5–5.3)
POTASSIUM SERPL-SCNC: 3.5 MMOL/L — SIGNIFICANT CHANGE UP (ref 3.5–5.3)
POTASSIUM SERPL-SCNC: 3.8 MMOL/L — SIGNIFICANT CHANGE UP (ref 3.5–5.3)
PROT SERPL-MCNC: 7.8 GM/DL — SIGNIFICANT CHANGE UP (ref 6–8.3)
PROTHROM AB SERPL-ACNC: 12.5 SEC — SIGNIFICANT CHANGE UP (ref 10.5–13.4)
RBC # BLD: 4.28 M/UL — SIGNIFICANT CHANGE UP (ref 4.2–5.8)
RBC # BLD: 4.55 M/UL — SIGNIFICANT CHANGE UP (ref 4.2–5.8)
RBC # FLD: 13.5 % — SIGNIFICANT CHANGE UP (ref 10.3–14.5)
RBC # FLD: 13.9 % — SIGNIFICANT CHANGE UP (ref 10.3–14.5)
RSV RNA NPH QL NAA+NON-PROBE: SIGNIFICANT CHANGE UP
SARS-COV-2 RNA SPEC QL NAA+PROBE: SIGNIFICANT CHANGE UP
SODIUM SERPL-SCNC: 139 MMOL/L — SIGNIFICANT CHANGE UP (ref 135–145)
SODIUM SERPL-SCNC: 141 MMOL/L — SIGNIFICANT CHANGE UP (ref 135–145)
WBC # BLD: 18.39 K/UL — HIGH (ref 3.8–10.5)
WBC # BLD: 7.57 K/UL — SIGNIFICANT CHANGE UP (ref 3.8–10.5)
WBC # FLD AUTO: 18.39 K/UL — HIGH (ref 3.8–10.5)
WBC # FLD AUTO: 7.57 K/UL — SIGNIFICANT CHANGE UP (ref 3.8–10.5)

## 2022-06-14 PROCEDURE — 80048 BASIC METABOLIC PNL TOTAL CA: CPT

## 2022-06-14 PROCEDURE — 71045 X-RAY EXAM CHEST 1 VIEW: CPT

## 2022-06-14 PROCEDURE — 99223 1ST HOSP IP/OBS HIGH 75: CPT

## 2022-06-14 PROCEDURE — 85027 COMPLETE CBC AUTOMATED: CPT

## 2022-06-14 PROCEDURE — 83605 ASSAY OF LACTIC ACID: CPT

## 2022-06-14 PROCEDURE — 99222 1ST HOSP IP/OBS MODERATE 55: CPT | Mod: AI

## 2022-06-14 PROCEDURE — 71045 X-RAY EXAM CHEST 1 VIEW: CPT | Mod: 26

## 2022-06-14 PROCEDURE — 99221 1ST HOSP IP/OBS SF/LOW 40: CPT

## 2022-06-14 PROCEDURE — 74177 CT ABD & PELVIS W/CONTRAST: CPT | Mod: 26,MA

## 2022-06-14 PROCEDURE — 36415 COLL VENOUS BLD VENIPUNCTURE: CPT

## 2022-06-14 PROCEDURE — C9113: CPT

## 2022-06-14 PROCEDURE — 93010 ELECTROCARDIOGRAM REPORT: CPT

## 2022-06-14 RX ORDER — ACETAMINOPHEN 500 MG
650 TABLET ORAL EVERY 6 HOURS
Refills: 0 | Status: DISCONTINUED | OUTPATIENT
Start: 2022-06-14 | End: 2022-06-14

## 2022-06-14 RX ORDER — TIMOLOL 0.5 %
1 DROPS OPHTHALMIC (EYE)
Refills: 0 | Status: DISCONTINUED | OUTPATIENT
Start: 2022-06-14 | End: 2022-06-16

## 2022-06-14 RX ORDER — PANTOPRAZOLE SODIUM 20 MG/1
40 TABLET, DELAYED RELEASE ORAL
Refills: 0 | Status: DISCONTINUED | OUTPATIENT
Start: 2022-06-14 | End: 2022-06-16

## 2022-06-14 RX ORDER — SACCHAROMYCES BOULARDII 250 MG
1 POWDER IN PACKET (EA) ORAL
Qty: 0 | Refills: 0 | DISCHARGE

## 2022-06-14 RX ORDER — SENNA PLUS 8.6 MG/1
2 TABLET ORAL
Qty: 0 | Refills: 0 | DISCHARGE

## 2022-06-14 RX ORDER — ONDANSETRON 8 MG/1
4 TABLET, FILM COATED ORAL EVERY 8 HOURS
Refills: 0 | Status: DISCONTINUED | OUTPATIENT
Start: 2022-06-14 | End: 2022-06-16

## 2022-06-14 RX ORDER — PANTOPRAZOLE SODIUM 20 MG/1
40 TABLET, DELAYED RELEASE ORAL DAILY
Refills: 0 | Status: DISCONTINUED | OUTPATIENT
Start: 2022-06-14 | End: 2022-06-14

## 2022-06-14 RX ORDER — LANOLIN ALCOHOL/MO/W.PET/CERES
3 CREAM (GRAM) TOPICAL AT BEDTIME
Refills: 0 | Status: DISCONTINUED | OUTPATIENT
Start: 2022-06-14 | End: 2022-06-16

## 2022-06-14 RX ORDER — ONDANSETRON 8 MG/1
4 TABLET, FILM COATED ORAL ONCE
Refills: 0 | Status: COMPLETED | OUTPATIENT
Start: 2022-06-14 | End: 2022-06-14

## 2022-06-14 RX ADMIN — Medication 1 DROP(S): at 22:01

## 2022-06-14 RX ADMIN — Medication 1 MILLIGRAM(S): at 05:09

## 2022-06-14 RX ADMIN — PANTOPRAZOLE SODIUM 40 MILLIGRAM(S): 20 TABLET, DELAYED RELEASE ORAL at 00:58

## 2022-06-14 RX ADMIN — ONDANSETRON 4 MILLIGRAM(S): 8 TABLET, FILM COATED ORAL at 00:58

## 2022-06-14 RX ADMIN — PANTOPRAZOLE SODIUM 40 MILLIGRAM(S): 20 TABLET, DELAYED RELEASE ORAL at 21:00

## 2022-06-14 RX ADMIN — ONDANSETRON 4 MILLIGRAM(S): 8 TABLET, FILM COATED ORAL at 02:09

## 2022-06-14 NOTE — CONSULT NOTE ADULT - ASSESSMENT
Pt is a 89y old Male with hx of  Alzheimer dementia, HTN, HLD, DM 2, presenting for eval of coffee ground emesis for the past 24 hours.  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 In Ed -  attempted at decompression of the stomach with NG tube pt pulled out, received IV Zofran ativan, placed on soft wrist restraints and re-inserted. Palliative medicine consulted to help establish GOC and advance care planning     1)  Pt is a 89y old Male with hx of  Alzheimer dementia, HTN, HLD, DM 2, presenting for eval of coffee ground emesis for the past 24 hours.  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 In Ed -  attempted at decompression of the stomach with NG tube pt pulled out, received IV Zofran ativan, placed on soft wrist restraints and re-inserted. Palliative medicine consulted to help establish GOC and advance care planning     1) Dementia/debility  -  PPSV2: 20   %  - FAST: 7c  - supportive care     2) Gastric outlet obstruction  - surgery consult appreciated   - CT abdomen plevis noted   - NGT tube inplace    3) advance care planning  - patient does not have capacity   - MOLST DNR/I on chart   - GOC meeting scheduled for 6/15 10AM

## 2022-06-14 NOTE — ED ADULT NURSE NOTE - OBJECTIVE STATEMENT
Pt presents from [facility] with c/o coffee ground emesis and abd pain. Daughter reports hx of large hiatal hernia, denies GI bleeding in past.

## 2022-06-14 NOTE — CHART NOTE - NSCHARTNOTEFT_GEN_A_CORE
HPI: As per medical record,   89 y/o male w/ pmhx of Alzheimer dementia, HTN, HLD, DM 2, presenting for eval of coffee ground emesis for the past 24 hours.   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  In Ed -  attempted at decompression of the stomach with NG tube pt pulled out, received IV zofran ativan, placed on soft wrist restraints and re-inserted.  GOC - MOLST in place, confirmed DNR/ DNI/ No Feeding tube. (14 Jun 2022 11:08)      PERTINENT PMH REVIEWED:  [ x ] YES [ ] NO           Primary Contact:  daughter Karen ([phone])     HCP [ x ] Surrogate [   ] Guardian [   ]    Mental Status: Alert  [ x ] Oriented [  ] Confused [  x] Lethargic [  ]  Concerns of Depression [  ] None reported  Anxiety [   ] None reported   Baseline ADLs (prior to admission):  Independent [ ] moderately [ ] fully   Dependent   [ x ] moderately [ ]fully    Family Meeting attendees: GOC scheduled for Wednesday 6/15 at 10AM    Anticipated Grief: Patient[  ] Family [  ]    Caregiver [place] Assessed: Yes [ x ] No [  ]    Orthodoxy: Buddhist    Spiritual Concerns: None reported.  available.     Goals of Care: TBD    Previous Services: [facility]    ADVANCE DIRECTIVES:  [ x] YES [ ] NO    - MOLST reflecting DNR/DNI/NFT wishes   - Health Care Proxy naming rex Cheema "Karen" as primary health care agent.       Summary: This SW & Pall NP met with patient & pt's daughter Karen to introduce team & offer support. Palliative SW role explained. Pt appears alert with some confusion; Panamanian Speaking. Daughter Karen at bedside & translates for her father. She explains she is really rhe only family involved.     Karne is in agreement to schedule GOC meeting for Wednesday at 10AM; Karen may or may not bring her cousin. Emotional support provided. Our team will continue to follow. HPI: As per medical record,   89 y/o male w/ pmhx of Alzheimer dementia, HTN, HLD, DM 2, presenting for eval of coffee ground emesis for the past 24 hours.   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  In Ed -  attempted at decompression of the stomach with NG tube pt pulled out, received IV zofran ativan, placed on soft wrist restraints and re-inserted.  GOC - MOLST in place, confirmed DNR/ DNI/ No Feeding tube. (14 Jun 2022 11:08)      PERTINENT PMH REVIEWED:  [ x ] YES [ ] NO           Primary Contact:  daughter Karen ([phone])     HCP [ x ] Surrogate [   ] Guardian [   ]    Mental Status: Alert  [ x ] Oriented [  ] Confused [  x] Lethargic [  ]  Concerns of Depression [  ] None reported  Anxiety [   ] None reported   Baseline ADLs (prior to admission):  Independent [ ] moderately [ ] fully   Dependent   [ x ] moderately [ ]fully    Family Meeting attendees: GOC scheduled for Wednesday 6/15 at 10AM    Anticipated Grief: Patient[  ] Family [  ]    Caregiver [place] Assessed: Yes [ x ] No [  ]    Hindu: Nondenominational    Spiritual Concerns: None reported.  available.     Goals of Care: TBD    Previous Services: [facility]    ADVANCE DIRECTIVES:  [ x] YES [ ] NO    - MOLST reflecting DNR/DNI/NFT wishes   - Health Care Proxy naming rex Cheema "Karen" as primary health care agent.       Summary: This SW & Pall NP met with patient & pt's daughter Karen to introduce team & offer support. Palliative SW role explained. Pt appears alert with some confusion; Jordanian Speaking. Daughter Karen at bedside & translates for her father. She explains she is an pt's only child & is really the only family involved.    Karen is in agreement to schedule family meeting for Wednesday 6/15 at 10AM; Karen may bring her cousin to meeting. Emotional support provided. Our team will continue to follow. HPI: As per medical record,   89 y/o male w/ pmhx of Alzheimer dementia, HTN, HLD, DM 2, presenting for eval of coffee ground emesis for the past 24 hours.   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  In Ed -  attempted at decompression of the stomach with NG tube pt pulled out, received IV zofran ativan, placed on soft wrist restraints and re-inserted.  GOC - MOLST in place, confirmed DNR/ DNI/ No Feeding tube. (14 Jun 2022 11:08)      PERTINENT PMH REVIEWED:  [ x ] YES [ ] NO           Primary Contact:  daughter Karen ([phone])     HCP [ x ] Surrogate [   ] Guardian [   ]    Mental Status: Alert  [ x ] Oriented [  ] Confused [  x] Lethargic [  ]  Concerns of Depression [  ] None reported  Anxiety [   ] None reported   Baseline ADLs (prior to admission):  Independent [ ] moderately [ ] fully   Dependent   [ x ] moderately [ ]fully    Family Meeting attendees: GOC scheduled for Wednesday 6/15 at 10AM    Anticipated Grief: Patient[  ] Family [  ]    Caregiver [place] Assessed: Yes [ x ] No [  ]    Pentecostal: Adventism    Spiritual Concerns: None reported.  available.     Goals of Care: TBD    Previous Services: [facility]    ADVANCE DIRECTIVES:  [ x] YES [ ] NO    - MOLST reflecting DNR/DNI/NFT wishes   - Health Care Proxy naming rex Cheema "Karen" as primary health care agent.     Anticipated D/C Plan: TBD.     Summary: This SW & Pall NP met with patient & pt's daughter Karen to introduce team & offer support. Palliative SW role explained. Pt appears alert with some confusion; Slovak Speaking. Daughter Karen at bedside & translates for her father. She explains she is an pt's only child & is really the only family involved.    Karen is in agreement to schedule family meeting for Wednesday 6/15 at 10AM; Karen may bring her cousin to meeting. Emotional support provided. Our team will continue to follow.

## 2022-06-14 NOTE — PATIENT PROFILE ADULT - OVER THE PAST TWO WEEKS, HAVE YOU FELT LITTLE INTEREST OR PLEASURE IN DOING THINGS?
Body Location Override (Optional - Billing Will Still Be Based On Selected Body Map Location If Applicable): left mid back Detail Level: Detailed Add 89335 Cpt? (Important Note: In 2017 The Use Of 86615 Is Being Tracked By Cms To Determine Future Global Period Reimbursement For Global Periods): yes Wound Evaluated By: Sophie Begum MA no

## 2022-06-14 NOTE — PATIENT PROFILE ADULT - TRANSPORTATION - OTHER DETAILS
dtr states pt will needs of assistance with transportation once discharge from [facility]

## 2022-06-14 NOTE — PHARMACOTHERAPY INTERVENTION NOTE - COMMENTS
Medication history complete, patient is from [facility], reviewed and confirmed medication with list provided by facility.

## 2022-06-14 NOTE — CONSULT NOTE ADULT - SUBJECTIVE AND OBJECTIVE BOX
A 89 year old male who was seen in the ED for vomiting with coffee ground material  He was found to have a large hiatal hernia with possible gastric outlet obstruction on CT scan, for which surgery were consulted  Patient was seen at the bedside with the daughter on his side. Patient suffers from dementia, history was obtained from the daughter as well as from the chart  Patient called his daughter yesterday telling her that he was having multiple bouts of vomiting. He was found to have coffee ground emesis and was taken to the ED. AS per the daughter, he was seen previously in the hospital and he was found to have a large hiatal hernia back in August but no surgical intervention was warranted at that time. He was having intermittent abdominal pain but unclear if it was related to food ot not. No history of previous episodes of hematemesis in the past and no history of peptic ulcer disease.    PAST MEDICAL & SURGICAL HISTORY:  HTN (hypertension)      HLD (hyperlipidemia)      Diabetes      History of BPH      OLIVER (dementia of Alzheimer type)      Bradycardia      Glaucoma      S/P hernia repair      Objective:  T(C): 36.5 (06-14-22 @ 07:28), Max: 36.5 (06-13-22 @ 23:29)  HR: 77 (06-14-22 @ 07:28) (77 - 99)  BP: 120/60 (06-14-22 @ 07:28) (120/60 - 160/96)  RR: 15 (06-14-22 @ 07:28) (15 - 18)  SpO2: 95% (06-14-22 @ 07:28) (95% - 100%)  Alert, conscious, oriented to person only   Pale, no jaundice or cyanosis  Not in pain or distress  NG in place with coffee ground output  Chest clear, equal air entry bilaterally  Abdomen soft and lax, no tenderness  Extremities: No swelling or tenderness    06-13-22 @ 07:01  -  06-14-22 @ 07:00  --------------------------------------------------------  IN: 0 mL / OUT: 1300 mL / NET: -1300 mL                        14.5   7.57  )-----------( 216      ( 14 Jun 2022 00:06 )             43.5   06-14    139  |  101  |  28<H>  ----------------------------<  196<H>  3.5   |  29  |  1.49<H>    Ca    11.2<H>      14 Jun 2022 00:06    TPro  7.8  /  Alb  3.5  /  TBili  0.5  /  DBili  x   /  AST  28  /  ALT  19  /  AlkPhos  142<H>  06-14  < from: CT Abdomen and Pelvis w/ IV Cont (06.14.22 @ 01:10) >  IMPRESSION:  1. Mechanical gastric outlet obstruction secondary to compression the   distal  gastric antrum in the diaphragmatic hiatus in the setting of a partially  intrathoracic gastric volvulus. The supradiaphragmatic and infra   diaphragmatic  portions of the stomach proximal to the obstruction are dilated with   fluid wall  of the small bowel and colon are diffusely under distended.  2. The imaged portion of the distal esophagus suggests possible distal  esophageal wall thickening which would indicate esophagitis.  3. Compression fractures of the T12 and L1 vertebral bodies. The T12   vertebral  body fracture appears chronic and L1 vertebral body fracture appears  acute/subacute on chronic.  4. Evaluation of numerous small low-attenuation lesions in the kidneys  bilaterally is limited by streak artifact from scanning with arms at the   sides  and these are indeterminate; however, most likely cysts.  5. There is 6 mm dilatation a section a pancreatic duct in the inferior   head of  the pancreas which is more dilated when compared to the prior study. No  discernible associated mass. Lesion is indeterminate.      < end of copied text >      
Patient is a 89y old  Male who presents with a chief complaint of Coffee ground emesis (14 Jun 2022 08:26)    HPI:  This is a 89 year old male with past medical history of HLD, diabetes, BPH, dementia, and pancreatic cyst presenting with coffee ground emesis and leukocytosis. Patient seen at bedside, confused with dementia and wrist restraints bilaterally. Unable to illicit responses to questions. Tender abdomen upon palpation epigastric region and NGT in place- per chat: 1300ml bilious drainage outpatient overnight. CT with gastric volvulus.       PAST MEDICAL & SURGICAL HISTORY:  HTN (hypertension)  HLD (hyperlipidemia)  Diabetes  History of BPH  OLIVER (dementia of Alzheimer type)  Bradycardia  Glaucoma    S/P hernia repair    MEDICATIONS  (STANDING):    MEDICATIONS  (PRN):  acetaminophen     Tablet .. 650 milliGRAM(s) Oral every 6 hours PRN Temp greater or equal to 38C (100.4F), Mild Pain (1 - 3)  aluminum hydroxide/magnesium hydroxide/simethicone Suspension 30 milliLiter(s) Oral every 4 hours PRN Dyspepsia  melatonin 3 milliGRAM(s) Oral at bedtime PRN Insomnia  ondansetron Injectable 4 milliGRAM(s) IV Push every 8 hours PRN Nausea and/or Vomiting    Allergies    penicillins (Other)  tetracyclines (Other)    Intolerances    SOCIAL HISTORY:    FAMILY HISTORY:  Known health problems: none    REVIEW OF SYSTEMS:  Unable to obtain due to baseline dementia    Vital Signs Last 24 Hrs  T(C): 36.5 (14 Jun 2022 07:28), Max: 36.5 (13 Jun 2022 23:29)  T(F): 97.7 (14 Jun 2022 07:28), Max: 97.7 (13 Jun 2022 23:29)  HR: 77 (14 Jun 2022 07:28) (77 - 99)  BP: 120/60 (14 Jun 2022 07:28) (120/60 - 160/96)  BP(mean): 98 (14 Jun 2022 06:45) (75 - 98)  RR: 15 (14 Jun 2022 07:28) (15 - 18)  SpO2: 95% (14 Jun 2022 07:28) (95% - 100%)    PHYSICAL EXAM:    Constitutional: No acute distress, frail, non-toxic appearing  HEENT: not icteric  Neck: supple, no lymphadenopathy  Respiratory: clear to ascultation bilaterally, no wheezing  Cardiovascular: S1 and S2, regular rate and rhythm, no murmurs rubs or gallops  Gastrointestinal: soft, epig tender, non-distended, +bowel sounds, no rebound or guarding, no surgical scars, no drains  Extremities: No peripheral edema, no cyanosis or clubbing  Vascular: 2+ peripheral pulses, no venous stasis  Neurological: A/O x 0, no focal deficits, no asterixis  Skin: No rashes, not jaundiced    LABS:                        13.8   18.39 )-----------( 194      ( 14 Jun 2022 08:54 )             41.3     06-14    141  |  105  |  33<H>  ----------------------------<  163<H>  3.8   |  29  |  1.63<H>    Ca    10.8<H>      14 Jun 2022 08:54    TPro  7.8  /  Alb  3.5  /  TBili  0.5  /  DBili  x   /  AST  28  /  ALT  19  /  AlkPhos  142<H>  06-14    PT/INR - ( 14 Jun 2022 00:06 )   PT: 12.5 sec;   INR: 1.08 ratio         PTT - ( 14 Jun 2022 00:06 )  PTT:29.6 sec  LIVER FUNCTIONS - ( 14 Jun 2022 00:06 )  Alb: 3.5 g/dL / Pro: 7.8 gm/dL / ALK PHOS: 142 U/L / ALT: 19 U/L / AST: 28 U/L / GGT: x             RADIOLOGY & ADDITIONAL STUDIES:  CT: gastric volvulus
HPI: Pt is a 89y old Male with hx of  Alzheimer dementia, HTN, HLD, DM 2, presenting for eval of coffee ground emesis for the past 24 hours.  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 In Ed -  attempted at decompression of the stomach with NG tube pt pulled out, received IV Zofran ativan, placed on soft wrist restraints and re-inserted. Palliative medicine consulted to help establish GOC and advance care planning     6/14/2022 patient seen and examined with daughter at bedside, patient Persian speaking with history of dementia, patient appears comfortable complaining of NGT, and would like something to drink, asked for daughter to translate. GOC meeting scheduled for 6/15 at 10AM       PAIN: (x )Yes   ( )No  unable to further describe - patient pointing at NGT     DYSPNEA: ( ) Yes  (x ) No  Level:    PAST MEDICAL & SURGICAL HISTORY:  HTN (hypertension)  HLD (hyperlipidemia)  Diabetes  History of BPH  OLIVER (dementia of Alzheimer type)  Bradycardia  Glaucoma  S/P hernia repair    SOCIAL HX:  from [employer]     Hx opiate tolerance ( )YES  (x )NO    Baseline ADLs  (Prior to Admission)  ( ) Independent   (x )Dependent    FAMILY HISTORY:  Known health problems: none    Review of Systems:    Unable to obtain/Limited due to: history of dementia       PHYSICAL EXAM:    Vital Signs Last 24 Hrs  T(C): 36.5 (14 Jun 2022 07:28), Max: 36.5 (13 Jun 2022 23:29)  T(F): 97.7 (14 Jun 2022 07:28), Max: 97.7 (13 Jun 2022 23:29)  HR: 77 (14 Jun 2022 07:28) (77 - 99)  BP: 120/60 (14 Jun 2022 07:28) (120/60 - 160/96)  BP(mean): 98 (14 Jun 2022 06:45) (75 - 98)  RR: 15 (14 Jun 2022 07:28) (15 - 18)  SpO2: 95% (14 Jun 2022 07:28) (95% - 100%)  Daily Height in cm: 162.56 (13 Jun 2022 23:29)      PPSV2: 20   %  FAST: 7c    General: elderly frail male in bed, NAD   Mental Status: alert but history of dementia, daughter at bedside translating   HEENT: dry oral mucosa, NGT in place   Lungs: lungs clear b/l   Cardiac: s1s2 +   GI: non tender, non distended, +BS  : no suprapubic tenderness   Ext: no edema, moves all extremities   Neuro: history of dementia     LABS:                        13.8   18.39 )-----------( 194      ( 14 Jun 2022 08:54 )             41.3     06-14    141  |  105  |  33<H>  ----------------------------<  163<H>  3.8   |  29  |  1.63<H>    Ca    10.8<H>      14 Jun 2022 08:54    TPro  7.8  /  Alb  3.5  /  TBili  0.5  /  DBili  x   /  AST  28  /  ALT  19  /  AlkPhos  142<H>  06-14    PT/INR - ( 14 Jun 2022 00:06 )   PT: 12.5 sec;   INR: 1.08 ratio         PTT - ( 14 Jun 2022 00:06 )  PTT:29.6 sec  Albumin: Albumin, Serum: 3.5 g/dL (06-14 @ 00:06)    Allergies    penicillins (Other)  tetracyclines (Other)    Intolerances      MEDICATIONS  (STANDING):  pantoprazole  Injectable 40 milliGRAM(s) IV Push two times a day  timolol 0.5% Solution 1 Drop(s) Both EYES two times a day    MEDICATIONS  (PRN):  aluminum hydroxide/magnesium hydroxide/simethicone Suspension 30 milliLiter(s) Oral every 4 hours PRN Dyspepsia  melatonin 3 milliGRAM(s) Oral at bedtime PRN Insomnia  ondansetron Injectable 4 milliGRAM(s) IV Push every 8 hours PRN Nausea and/or Vomiting      RADIOLOGY/ADDITIONAL STUDIES:    ACC: [id number] EXAM:  XR CHEST PORTABLE IMMED 1V                        ACC: [id number] EXAM:  XR CHEST PORTABLE IMMED 1V                        ACC: [id number] EXAM:  XR CHEST PORTABLE URGENT 1V                          PROCEDURE DATE:  06/14/2022    INTERPRETATION:  AP chest on June 14, 2022 at 12:55 AM. Patient is   scheduled for admission.  Heart magnified by technique. Left-sided pacemaker again noted. Quite   large hiatal hernia again noted.  Lungs remain grossly clear.  Chest is similar to December 21, 2021.  Follow-up AP chest on June 14, 2022 at 2:42 AM.  NG tube was inserted but tip is in the upper esophageal area. Otherwise  no gross change.  Follow-up AP chest on June 14, 2022 at 6:47 AM.  NG tube inserted going through the hernia into the stomach. Otherwise no   change.    IMPRESSION: Quite large hiatal hernia and pacemaker again noted. Final   film shows successful placement of NG tube.    ACC: [id number] EXAM:  CT ABDOMEN AND PELVIS IC                        PROCEDURE DATE:  06/14/2022    INTERPRETATION:  Addendum created by Antwon Abreu MD on 6/14/2022   5:53:55 AM EDT:  Findings discussed with Gigi BASHIR MD at time of interpretation.  Initial report created on 6/14/2022 5:28:45 AM EDT:  PROCEDURE INFORMATION:  Exam: CT Abdomen And Pelvis With Contrast  Exam date and time: 6/14/2022 12:46 AM  Age: 89 years old  Clinical indication: Other: . Patient HX: Bloody emesis. Abd pain  TECHNIQUE:  Imaging protocol: Computed tomography of the abdomen and pelvis with   contrast.  Contrast material: OMNI 350; Contrast volume: 100 ml; Contrast route:  INTRAVENOUS (IV);  COMPARISON:  CT ABDOMEN AND PELVIS WITH IV CONTRAST 8/13/2021 7:11 PM  FINDINGS:  Lungs: Bibasilar atelectasis.  Mediastinal space: The imaged portion of the distal esophagus suggests   possible  distal esophageal wall thickening which would indicate esophagitis.  Liver: Normal. No mass.  Gallbladder and bile ducts: Normal. No calcified stones. No ductal   dilation.  Pancreas: There is 6 mm dilatation a section a pancreatic duct in the   inferior  head of the pancreas which is more dilated when compared to the prior   study. No  discernibleassociated mass. Lesion is indeterminate.  Spleen: Normal.  Adrenal glands: Normal. No mass.  Kidneys and ureters: Chronic medical renal disease.  Stomach and bowel: Mechanical gastric outlet obstruction secondary to  compression the distal gastric antrum in the diaphragmatic hiatus in the  setting of a partially intrathoracic gastric volvulus. The   supradiaphragmatic  and infra diaphragmatic portions of the stomach proximal to the   obstruction are  dilated with fluid wall of the small bowel and colon are diffusely under  distended. No intestinal wall thickening.  Appendix: Normal appendix.  Intraperitoneal space: Unremarkable. No pneumoperitoneum. No abscess.No  inflammation.  Vasculature: Unremarkable.  Lymph nodes: Unremarkable.  Urinary bladder: Unremarkable as visualized.  Reproductive: Unremarkable as visualized.  Bones/joints: Compression fractures of the T12 and L1 vertebral bodies.   The T12  vertebral body fracture appears chronic and L1 vertebral body fracture   appears  acute/subacute on chronic. Evaluation of numerous small low-attenuation   lesions  in the kidneys bilaterally is limited by streak artifact from scanning   with  arms at the sides and these are indeterminate; however, most likely cysts.  Soft tissues: Unremarkable.    IMPRESSION:  1. Mechanical gastric outlet obstruction secondary to compression the   distal  gastric antrum in the diaphragmatic hiatus in the setting of a partially  intrathoracic gastric volvulus. The supradiaphragmatic and infra   diaphragmatic  portions of the stomach proximal to the obstruction are dilated with   fluid wall  of the small bowel and colon are diffusely under distended.  2. The imaged portion of the distal esophagus suggests possible distal  esophageal wall thickening which would indicate esophagitis.  3. Compression fractures of the T12 and L1 vertebral bodies. The T12   vertebral  body fracture appears chronic and L1 vertebral body fracture appears  acute/subacute on chronic.  4. Evaluation of numerous small low-attenuation lesions in the kidneys  bilaterally is limited by streak artifact from scanning with arms at the   sides  and these are indeterminate; however, most likely cysts.  5. There is 6 mm dilatation a section a pancreatic duct in the inferior   head of  the pancreas which is more dilated when compared to the prior study. No  discernible associated mass. Lesion is indeterminate.  See final report for CT scan of the abdomen below.  CLINICAL INFORMATION: Bloody emesis with abdominal pain.  COMPARISON: CT scan abdomen pelvis 8/13/2021.  PROCEDURE:  CT of the Abdomen and Pelvis was performed.  100 cc of Omnipaque 350 intravenous contrast.  Sagittal and coronal reformats were performed.  FINDINGS:  LOWER CHEST:  Trace bilateral pleural effusions with bibasilar atelectasis.  Partially imaged cardiac pacemaker leads.  LIVER: Within normal limits.  BILE DUCTS: Normal caliber.  GALLBLADDER: Within normal limits.  SPLEEN: Within normal limits.  PANCREAS: 1.8 cm low-density lesion uncinate process, enlarged from prior   exam.  ADRENALS: Within normal limits.  KIDNEYS/URETERS:  Multiple indeterminate renal hypodense lesions, stable.  No hydronephrosis.  BLADDER: Distended.  REPRODUCTIVE ORGANS: Enlarged prostate.  BOWEL:  Dilated fluid-filled stomach with partial intrathoracic positioning of   the stomach, with twist at the diaphragmatic hiatus, findings compatible   with gastric volvulus resulting in gastric outlet obstruction.  There is fecal retention throughout the right colon, transverse colon and   rectum.  PERITONEUM: No ascites.  No localized intra-abdominal fluid collection or pneumoperitoneum.  VESSELS: Atherosclerotic changes.  RETROPERITONEUM/LYMPH NODES: No lymphadenopathy.  ABDOMINAL WALL: Within normal limits.  BONES: Degenerative changes.  Age indeterminate compression fractures T12 and L1 vertebral bodies,   which are new from prior exam.    IMPRESSION:  Gastric volvulus/gastric outlet obstruction with herniation of large   portion of the stomach above the hemidiaphragm.  Other findings as discussed above.  This study was preliminary reported by [facility].

## 2022-06-14 NOTE — H&P ADULT - ASSESSMENT
89 y/o male w/ pmhx of Alzheimer dementia, HTN, HLD, DM 2, presenting for eval of coffee ground emesis for the past 24 hours.   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  In Ed -  attempted at decompression of the stomach with NG tube pt pulled out, received IV zofran ativan, placed on soft wrist restraints and re-inserted.     # acute mechanical vovlulus   - CT scan as above   - cont w/ NG tube for decompression   - suriglca consult   - GI consult   - PPI BID  89 y/o male w/ pmhx of Alzheimer dementia, HTN, HLD, DM 2, presenting for eval of coffee ground emesis for the past 24 hours.   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  In ED -  attempted at decompression of the stomach with NG tube pt pulled out, received IV zofran ativan, placed on soft wrist restraints and re-inserted.     # acute mechanical vovlulus   - CT scan as above   - cont w/ NG tube for decompression   - maintain wrist restraints to prevent pulling of tube   - surgical consult   - GI consult   - antiemetics and analgesia PRN   - received ativan 1mg in ED for agitation. - pt with hx of hospital delirium requiring Seroquel PRN on prior admission.   - PPI BID   - NPO     # Alzheimer dementia / HTN / HLD / DM 2   - supportive care   - holding bp meds and hld meds given NG tube   - pt NPO, will not order insulin at this time.     vte   - SCD - holding AC given NG tube & delirium     above plan discussed with pt, daughter at bedside, bedside RN, and MD Velasquez     - long discussion with daughter at bedside. she reports pt has declined significantly over past year, and expressed concern over surgical outcomes being that if surgery is offered pt may have PEG tube which is not in line with MOLST, may had exlap due to advanced vovulus and may ultimately suffer more than he currently is. I have discussed my recommendation for comfort focused approached given advanced age and comorbidities. she reports pt house  at facility also has similar reccomedations.At this time, she wishes that we do not pursue surgical interventions and would be open to conversation with Pall care team regarding hospice vs pall upon dc to facility.

## 2022-06-14 NOTE — CONSULT NOTE ADULT - ASSESSMENT
89 year old male presenting with CGE, with NGT now and + CT for gastric volvulus with elevated lactate, worsening renal function, and leukocytosis. Obviously surgical team should be involved and determining plan for surgical intervention.    Discussion of possible EGD to determine if mucosa is, in fact, ischemic and surgical intervention required however if ischemic or not, volvulus likely to reoccur thus predicating surgery regardless.    Plan:  ::Continue NGT  ::NPO  ::No EGD warranted at this time    Plan discussed with Dr. Adler

## 2022-06-14 NOTE — CONSULT NOTE ADULT - ATTENDING COMMENTS
Patient is currently sedated & restrained do to dementia and pulling out NGT. labs & imaging reviewed concerning for gastric volvulus with GOO and strangulation. D/W Radha Anderson (Daughter-HCP) about patient's GOC and surgical intervention in laymen's terms. Daughter states significant decline in patient over status over the past year and surgery offered to Radha, and she would like to have GOC discussion.       Plan  - C/W current management with NGT decompression and resuscitation   - Palliative consult  Case d/w primary team Maki Schultz NP & Dr. Adler

## 2022-06-14 NOTE — PATIENT PROFILE ADULT - FALL HARM RISK - HARM RISK INTERVENTIONS
Assistance with ambulation/Assistance OOB with selected safe patient handling equipment/Communicate Risk of Fall with Harm to all staff/Discuss with provider need for PT consult/Monitor for mental status changes/Monitor gait and stability/Move patient closer to nurses' station/Provide patient with walking aids - walker, cane, crutches/Reinforce activity limits and safety measures with patient and family/Reorient to person, place and time as needed/Tailored Fall Risk Interventions/Toileting schedule using arm’s reach rule for commode and bathroom/Use of alarms - bed, chair and/or voice tab/Visual Cue: Yellow wristband and red socks/Bed in lowest position, wheels locked, appropriate side rails in place/Call bell, personal items and telephone in reach/Instruct patient to call for assistance before getting out of bed or chair/Non-slip footwear when patient is out of bed/[place] to call system/Physically safe environment - no spills, clutter or unnecessary equipment/Purposeful Proactive Rounding/Room/bathroom lighting operational, light cord in reach Assistance with ambulation/Assistance OOB with selected safe patient handling equipment/Communicate Risk of Fall with Harm to all staff/Monitor for mental status changes/Monitor gait and stability/Provide patient with walking aids - walker, cane, crutches/Reinforce activity limits and safety measures with patient and family/Reorient to person, place and time as needed/Sit up slowly, dangle for a short time, stand at bedside before walking/Toileting schedule using arm’s reach rule for commode and bathroom/Use of alarms - bed, chair and/or voice tab/Visual Cue: Yellow wristband and red socks/Bed in lowest position, wheels locked, appropriate side rails in place/Call bell, personal items and telephone in reach/Instruct patient to call for assistance before getting out of bed or chair/Non-slip footwear when patient is out of bed/[place] to call system/Purposeful Proactive Rounding/Room/bathroom lighting operational, light cord in reach

## 2022-06-14 NOTE — H&P ADULT - HISTORY OF PRESENT ILLNESS
89 y/o male w/ pmhx of Alzheimer dementia, HTN, HLD, DM 2, presenting for eval of coffee ground emesis for the past 24 hours.   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  In Ed -  attempted at decompression of the stomach with NG tube pt pulled out, received IV zofran ativan, placed on soft wrist restraints and re-inserted.     GOC - MOLST in place, confirmed DNR/ DNI/ No Feeding tube.

## 2022-06-14 NOTE — ED PROVIDER NOTE - CARE PLAN
1 Principal Discharge DX:	GIB (gastrointestinal bleeding)   Principal Discharge DX:	GIB (gastrointestinal bleeding)  Secondary Diagnosis:	Obstructed, gastric outlet

## 2022-06-14 NOTE — H&P ADULT - NSHPLABSRESULTS_GEN_ALL_CORE
-                               13.8   18.39 )-----------( 194      ( 14 Jun 2022 08:54 )             41.3     06-14    141  |  105  |  33<H>  ----------------------------<  163<H>  3.8   |  29  |  1.63<H>    Ca    10.8<H>      14 Jun 2022 08:54    TPro  7.8  /  Alb  3.5  /  TBili  0.5  /  DBili  x   /  AST  28  /  ALT  19  /  AlkPhos  142<H>  06-14      LIVER FUNCTIONS - ( 14 Jun 2022 00:06 )  Alb: 3.5 g/dL / Pro: 7.8 gm/dL / ALK PHOS: 142 U/L / ALT: 19 U/L / AST: 28 U/L / GGT: x           PT/INR - ( 14 Jun 2022 00:06 )   PT: 12.5 sec;   INR: 1.08 ratio  PTT - ( 14 Jun 2022 00:06 )  PTT:29.6 sec      Lactate, Blood: 3.0 mmol/L (06-14 @ 08:54)        < from: CT Abdomen and Pelvis w/ IV Cont (06.14.22 @ 01:10) >    IMPRESSION:  1. Mechanical gastric outlet obstruction secondary to compression the   distal  gastric antrum in the diaphragmatic hiatus in the setting of a partially  intrathoracic gastric volvulus. The supradiaphragmatic and infra   diaphragmatic  portions of the stomach proximal to the obstruction are dilated with   fluid wall  of the small bowel and colon are diffusely under distended.  2. The imaged portion of the distal esophagus suggests possible distal  esophageal wall thickening which would indicate esophagitis.  3. Compression fractures of the T12 and L1 vertebral bodies. The T12   vertebral  body fracture appears chronic and L1 vertebral body fracture appears  acute/subacute on chronic.  4. Evaluation of numerous small low-attenuation lesions in the kidneys  bilaterally is limited by streak artifact from scanning with arms at the   sides  and these are indeterminate; however, most likely cysts.  5. There is 6 mm dilatation a section a pancreatic duct in the inferior   head of  the pancreas which is more dilated when compared to the prior study. No  discernible associated mass. Lesion is indeterminate.      ******PRELIMINARY REPORT******      ******PRELIMINARY REPORT******     CHUCHO BAILEY M.D.;[facility] RADIOLOGIST    < end of copied text >

## 2022-06-14 NOTE — CONSULT NOTE ADULT - ASSESSMENT
A 89 year old male with coffee ground emesis and large hiatal hernia  Gastric outlet obstruction ? at the level of the diaphragmatic hiatus    Plan:  Serial H/H  NG to low intermittent suction  GI for possible endoscopy to evaluate for mucosal ischemia  Family discussion regarding possible surgery and to establish goals of care.  Medical and cardiology clearance for possible surgery  Lap vs open hiatal hernia repair with fundoplication, possible gastropexy    Plan discussed with Dr Aviles A 89 year old male with coffee ground emesis and large hiatal hernia  Gastric outlet obstruction ? at the level of the diaphragmatic hiatus    Plan:  Serial H/H  NG to low intermittent suction  GI for possible endoscopy to evaluate for mucosal ischemia  Family discussion to establish goals of care.  Discussed with the family the high risk of surgery.  Palliative care consult appreciated.  No surgical intervention at this time         Plan discussed with Dr Aviles

## 2022-06-14 NOTE — H&P ADULT - NSHPADDITIONALINFOADULT_GEN_ALL_CORE
Attending note: Patient seen and examined with IGNACIO Ribeiro  Case reviewed and discussed with her and necessary changes were made  Agree with her assessment and plan.

## 2022-06-14 NOTE — CONSULT NOTE ADULT - NS ATTEND AMEND GEN_ALL_CORE FT
Gastric volvulus in elderly male with dementia    I spoke to daughter in detail regarding surgery vs palliative care and comfort measures  She is considering her options

## 2022-06-14 NOTE — ED PROVIDER NOTE - OBJECTIVE STATEMENT
89 y/o male in ED from NH c/o coffee ground emesis all day.   denies any previous episodes.   tolerating PO.    no sick contacts.   pt denies any fever, HA, cp, sob.   states did have abd pain a few days ago.   pt denies any dark stool

## 2022-06-14 NOTE — ED PROVIDER NOTE - PROGRESS NOTE DETAILS
pt removed NGT and refusing another tube.   case d/w Marcelo and will admit for GIB with bridge and NANCY after CT received call from radiology about CT findings.  case d/w surgery resident and will evaluate pt.   request GI consult.   Ke Martinez made aware of CT findings and consults. case d/w [facility] and will evaluate pt

## 2022-06-14 NOTE — ED ADULT NURSE REASSESSMENT NOTE - NS ED NURSE REASSESS COMMENT FT1
Pt agitated and pulling at tubes and lines. Initiated soft wrist restraints and placed NGT.  Output of 1300cc dark red/brown liquid gastric contents. Report given to 5 east. Placement x-ray in progress.

## 2022-06-14 NOTE — H&P ADULT - NSHPPHYSICALEXAM_GEN_ALL_CORE
VITALS:  T(F): 97.7 (06-14-22 @ 07:28), Max: 97.7 (06-13-22 @ 23:29)  HR: 77 (06-14-22 @ 07:28) (77 - 99)  BP: 120/60 (06-14-22 @ 07:28) (120/60 - 160/96)  RR: 15 (06-14-22 @ 07:28) (15 - 18)  SpO2: 95% (06-14-22 @ 07:28) (95% - 100%)  Wt(kg): --    I&O's Summary    13 Jun 2022 07:01  -  14 Jun 2022 07:00  --------------------------------------------------------  IN: 0 mL / OUT: 1300 mL / NET: -1300 mL      PHYSICAL EXAM:  GENERAL: eldery male lethargic,   HEENT:  Dry mucous membranes, Left nare NG tube in plcae pupils equal and reactive, EOMI, no oropharyngeal lesions, erythema, exudates, oral thrush  CV:  +S1, +S2, regular, no murmurs or rubs  RESP:   lungs clear to auscultation bilaterally, no wheezing, rales, rhonchi, good air entry bilaterally  BREAST:  not examined  GI:  abdomen soft, tender, non-distended, normal BS, no bruits, no abdominal masses, no palpable masses  MSK:   normal muscle tone, no atrophy, no rigidity, no contractions  EXT:  no clubbing, no cyanosis, no edema, no calf pain, swelling or erythema  VASCULAR:  pulses equal and symmetric in the upper and lower extremities  NEURO:  AAOX3, no focal neurological deficits, follows all commands, able to move extremities spontaneously  SKIN:  no ulcers, lesions or rashes

## 2022-06-15 LAB
ANION GAP SERPL CALC-SCNC: 5 MMOL/L — SIGNIFICANT CHANGE UP (ref 5–17)
BUN SERPL-MCNC: 43 MG/DL — HIGH (ref 7–23)
CALCIUM SERPL-MCNC: 10.8 MG/DL — HIGH (ref 8.5–10.1)
CHLORIDE SERPL-SCNC: 108 MMOL/L — SIGNIFICANT CHANGE UP (ref 96–108)
CO2 SERPL-SCNC: 31 MMOL/L — SIGNIFICANT CHANGE UP (ref 22–31)
CREAT SERPL-MCNC: 1.74 MG/DL — HIGH (ref 0.5–1.3)
EGFR: 36 ML/MIN/1.73M2 — LOW
GLUCOSE SERPL-MCNC: 110 MG/DL — HIGH (ref 70–99)
HCT VFR BLD CALC: 38.9 % — LOW (ref 39–50)
HGB BLD-MCNC: 13.1 G/DL — SIGNIFICANT CHANGE UP (ref 13–17)
MCHC RBC-ENTMCNC: 32.5 PG — SIGNIFICANT CHANGE UP (ref 27–34)
MCHC RBC-ENTMCNC: 33.7 GM/DL — SIGNIFICANT CHANGE UP (ref 32–36)
MCV RBC AUTO: 96.5 FL — SIGNIFICANT CHANGE UP (ref 80–100)
PLATELET # BLD AUTO: 193 K/UL — SIGNIFICANT CHANGE UP (ref 150–400)
POTASSIUM SERPL-MCNC: 3.9 MMOL/L — SIGNIFICANT CHANGE UP (ref 3.5–5.3)
POTASSIUM SERPL-SCNC: 3.9 MMOL/L — SIGNIFICANT CHANGE UP (ref 3.5–5.3)
RBC # BLD: 4.03 M/UL — LOW (ref 4.2–5.8)
RBC # FLD: 14.1 % — SIGNIFICANT CHANGE UP (ref 10.3–14.5)
SODIUM SERPL-SCNC: 144 MMOL/L — SIGNIFICANT CHANGE UP (ref 135–145)
WBC # BLD: 12.92 K/UL — HIGH (ref 3.8–10.5)
WBC # FLD AUTO: 12.92 K/UL — HIGH (ref 3.8–10.5)

## 2022-06-15 PROCEDURE — 99232 SBSQ HOSP IP/OBS MODERATE 35: CPT

## 2022-06-15 PROCEDURE — 99497 ADVNCD CARE PLAN 30 MIN: CPT

## 2022-06-15 PROCEDURE — 99233 SBSQ HOSP IP/OBS HIGH 50: CPT

## 2022-06-15 RX ORDER — HALOPERIDOL DECANOATE 100 MG/ML
1 INJECTION INTRAMUSCULAR EVERY 4 HOURS
Refills: 0 | Status: DISCONTINUED | OUTPATIENT
Start: 2022-06-15 | End: 2022-06-16

## 2022-06-15 RX ORDER — ONDANSETRON 8 MG/1
4 TABLET, FILM COATED ORAL EVERY 6 HOURS
Refills: 0 | Status: DISCONTINUED | OUTPATIENT
Start: 2022-06-15 | End: 2022-06-16

## 2022-06-15 RX ORDER — MORPHINE SULFATE 50 MG/1
2 CAPSULE, EXTENDED RELEASE ORAL EVERY 4 HOURS
Refills: 0 | Status: DISCONTINUED | OUTPATIENT
Start: 2022-06-15 | End: 2022-06-16

## 2022-06-15 RX ADMIN — Medication 1 MILLIGRAM(S): at 21:13

## 2022-06-15 RX ADMIN — Medication 1 DROP(S): at 09:46

## 2022-06-15 RX ADMIN — PANTOPRAZOLE SODIUM 40 MILLIGRAM(S): 20 TABLET, DELAYED RELEASE ORAL at 09:47

## 2022-06-15 NOTE — GOALS OF CARE CONVERSATION - ADVANCED CARE PLANNING - CONVERSATION DETAILS
Team met with daughter Radha to discuss goals of care, assist with planning and provide supportive counseling.  Palliative role explained.  Patient resided LTC at [facility] prior to hospitalization.  Daughter desires her father to remain comfortable and is understanding of his medical status.  Pt. family expressed understanding.  Emotional support provided.    Pt. without capacity.  Patient pulled NG tube out twice and refused reinsertion.  Patient is currently NPO, daughter Radha (HCP agent) desires no feeding tube and comfort measures.  DNR/DNI in place.      Reviewed hospice services at nursing home with daughter as well as in patient hospice services.      Patient has DNR/DNI in place.  HCP on file.  No feeding tube and comfort measures in place.    At this time Pts. plan is to return to his nursing home with hospice services at his facility.  If he requires in-patient hospice due to uncontrollable symptoms an inpatient referral will be made for in patient hospice.  Daughter in agreement with plan.  Floor SW aware of plan.  Emotional support provided, our team to continue to follow up. Team met with daughter Radha to discuss goals of care, assist with planning and provide supportive counseling.  Palliative role explained.  Patient resided LTC at [facility] prior to hospitalization.  Daughter desires her father to remain comfortable and is understanding of his medical status.  Pt. family expressed understanding.  Emotional support provided.    Pt. without capacity.  Patient pulled NG tube out twice and refused reinsertion.  Patient is currently NPO, daughter Radha (HCP agent) desires no feeding tube and comfort measures.  DNR/DNI in place.  at this time family in agreement with giving patient pleasure feeds and seeing how he does over the next 24 hours but understanding the risk     Reviewed hospice services at nursing home with daughter as well as in patient hospice services.      Patient has DNR/DNI in place.  HCP on file.  No feeding tube and comfort measures in place.    At this time Pts. plan is to return to his nursing home with hospice services at his facility.  If he requires in-patient hospice due to uncontrollable symptoms an inpatient referral will be made for in patient hospice.  Daughter in agreement with plan.  Floor SW aware of plan.  Emotional support provided, our team to continue to follow up.

## 2022-06-15 NOTE — PROGRESS NOTE ADULT - SUBJECTIVE AND OBJECTIVE BOX
HPI: patient seen and examined with no family at bedside, patient pulled NGT tube out again overnight, at this time patient remains confused used  phone but patient was unable to participate in conversation due to confusion.       PAIN: (x )Yes   ( )No  unable to further describe - patient pointing at NGT     DYSPNEA: ( ) Yes  (x ) No  Level:    Review of Systems:    Unable to obtain/Limited due to: history of dementia       PHYSICAL EXAM:    Vital Signs Last 24 Hrs  T(C): 36.2 (15 Ata 2022 07:13), Max: 37.1 (15 Ata 2022 00:04)  T(F): 97.2 (15 Ata 2022 07:13), Max: 98.7 (15 Ata 2022 00:04)  HR: 81 (15 Ata 2022 07:13) (64 - 90)  BP: 110/71 (15 Ata 2022 07:13) (110/71 - 135/61)  RR: 18 (15 Ata 2022 07:13) (18 - 18)  SpO2: 97% (15 Ata 2022 07:13) (94% - 97%)    PPSV2: 20   %  FAST: 7c    General: elderly frail male in bed, NAD   Mental Status: alert but history of dementia, daughter at bedside translating   HEENT: dry oral mucosa, NGT in place   Lungs: lungs clear b/l   Cardiac: s1s2 +   GI: non tender, non distended, +BS  : no suprapubic tenderness   Ext: no edema, moves all extremities   Neuro: history of dementia         LABS:                        13.1   12.92 )-----------( 193      ( 15 Ata 2022 07:31 )             38.9     06-15    144  |  108  |  43<H>  ----------------------------<  110<H>  3.9   |  31  |  1.74<H>    Ca    10.8<H>      15 Ata 2022 07:31    TPro  7.8  /  Alb  3.5  /  TBili  0.5  /  DBili  x   /  AST  28  /  ALT  19  /  AlkPhos  142<H>  06-14    PT/INR - ( 14 Jun 2022 00:06 )   PT: 12.5 sec;   INR: 1.08 ratio         PTT - ( 14 Jun 2022 00:06 )  PTT:29.6 sec  Albumin: Albumin, Serum: 3.5 g/dL (06-14 @ 00:06)      Allergies    penicillins (Other)  tetracyclines (Other)    Intolerances      MEDICATIONS  (STANDING):  LORazepam   Injectable 1 milliGRAM(s) IV Push once  pantoprazole  Injectable 40 milliGRAM(s) IV Push two times a day  timolol 0.5% Solution 1 Drop(s) Both EYES two times a day    MEDICATIONS  (PRN):  aluminum hydroxide/magnesium hydroxide/simethicone Suspension 30 milliLiter(s) Oral every 4 hours PRN Dyspepsia  haloperidol    Injectable 1 milliGRAM(s) IntraMuscular every 4 hours PRN Agitation  LORazepam   Injectable 0.5 milliGRAM(s) IntraMuscular every 4 hours PRN Anxiety  melatonin 3 milliGRAM(s) Oral at bedtime PRN Insomnia  morphine  - Injectable 2 milliGRAM(s) SubCutaneous every 4 hours PRN Moderate pain (4-6), Severe pain (7-10), Respiratory rate greater than 22  ondansetron Injectable 4 milliGRAM(s) IV Push every 8 hours PRN Nausea and/or Vomiting  ondansetron Injectable 4 milliGRAM(s) IntraMuscular every 6 hours PRN Nausea and/or Vomiting

## 2022-06-15 NOTE — PROVIDER CONTACT NOTE (OTHER) - ASSESSMENT
Alert, confused, agitated   VSS, afebrile, not in distress  (-) nausea (-) vomiting (-) abdominal pain
pt agitated and confused

## 2022-06-15 NOTE — PROVIDER CONTACT NOTE (OTHER) - ACTION/TREATMENT ORDERED:
TORB no need to reinsert NGT at this time as long as the patient is not vomiting
will call surgical resident for recommendations as patient is agitated and confused,  not letting us place NG tube

## 2022-06-15 NOTE — PROGRESS NOTE ADULT - ASSESSMENT
89 y/o male w/ pmhx of Alzheimer dementia, HTN, HLD, DM 2, presenting for eval of coffee ground emesis for the past 24 hours.   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  In ED -  attempted at decompression of the stomach with NG tube pt pulled out, received IV zofran ativan, placed on soft wrist restraints and re-inserted.     # acute mechanical vovlulus   - CT scan as above   - pulled NG tube out despite restraints.   - maintain wrist restraints to prevent pulling of tube   - surgical consult   - GI consult   - antiemetics and analgesia PRN   - 1mg ativan stat x 1 for increased anxiety and delirium received ativan 1mg in ED for agitation.   - PPI BID   - NPO     # Alzheimer dementia / HTN / HLD / DM 2   - supportive care   - holding bp meds and hld meds given NG tube   - pt NPO, will not order insulin at this time.     vte   - SCD - holding AC given NG tube & delirium     above plan discussed with pt, daughter at bedside, bedside RN, and MD Velasquez     - [facility] meeting today     - long discussion with daughter at bedside. she reports pt has declined significantly over past year, and expressed concern over surgical outcomes being that if surgery is offered pt may have PEG tube which is not in line with MOLST, may had exlap due to advanced vovulus and may ultimately suffer more than he currently is. I have discussed my recommendation for comfort focused approached given advanced age and comorbidities. she reports pt house dr at facility also has similar reccomedations.At this time, she wishes that we do not pursue surgical interventions and would be open to conversation with Pall care team regarding hospice vs pall upon dc to facility.  89 y/o male w/ pmhx of Alzheimer dementia, HTN, HLD, DM 2, presenting for eval of coffee ground emesis for the past 24 hours.   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  In ED -  attempted at decompression of the stomach with NG tube pt pulled out, received IV zofran ativan, placed on soft wrist restraints and re-inserted.     # acute mechanical vovlulus   - CT scan as above   - pulled NG tube out despite restraints. - no plans to re-insert   - s/p wrist restraints to prevent pulling of tube   - surgical consult - no surgical intervention   - GI consult - no intervention or scoping   - antiemetics and analgesia PRN   - 1mg ativan, morphine haldol,   - PPI BID   - puree diet  - comfort measures     # Alzheimer dementia / HTN / HLD / DM 2   - supportive care   - holding bp meds and hld meds given comfort measure approach     vte   - SCD - holding AC     above plan discussed with pt, daughter at bedside, bedside RN, and MD Velasquez     - [facility] meeting  plan for hospice at [facility] vs inpatient if vomiting     - long discussion with daughter at bedside. she reports pt has declined significantly over past year, and expressed concern over surgical outcomes being that if surgery is offered pt may have PEG tube which is not in line with MOLST, may had exlap due to advanced vovulus and may ultimately suffer more than he currently is. I have discussed my recommendation for comfort focused approached given advanced age and comorbidities. she reports pt house  at [facility] also has similar reccomedations.At this time, she wishes that we do not pursue surgical interventions and would be open to conversation with Pall care team regarding hospice vs pall upon dc to facility.

## 2022-06-15 NOTE — PROGRESS NOTE ADULT - SUBJECTIVE AND OBJECTIVE BOX
Patient is a 89y old  Male who presents with a chief complaint of Vomiting (14 Jun 2022 13:47)      SUBJECTIVE:   HPI:  89 y/o male w/ pmhx of Alzheimer dementia, HTN, HLD, DM 2, presenting for eval of coffee ground emesis for the past 24 hours.   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  In Ed -  attempted at decompression of the stomach with NG tube pt pulled out, received IV zofran ativan, placed on soft wrist restraints and re-inserted.     GOC - MOLST in place, confirmed DNR/ DNI/ No Feeding tube.    (14 Jun 2022 11:08)      6/15      REVIEW OF SYSTEMS:    CONSTITUTIONAL: No weakness, fevers or chills  EYES/ENT: No visual changes;  No vertigo or throat pain   NECK: No pain or stiffness  RESPIRATORY: No cough, wheezing, hemoptysis; No shortness of breath  CARDIOVASCULAR: No chest pain or palpitations  GASTROINTESTINAL: No abdominal or epigastric pain. No nausea, vomiting, or hematemesis; No diarrhea or constipation. No melena or hematochezia.  GENITOURINARY: No dysuria, frequency or hematuria  NEUROLOGICAL: No numbness or weakness  SKIN: No itching, burning, rashes, or lesions   All other review of systems is negative unless indicated above      Weight (kg): 53.3 (06-14 @ 10:08)    Vital Signs Last 24 Hrs  T(C): 36.2 (15 Ata 2022 07:13), Max: 37.1 (15 Ata 2022 00:04)  T(F): 97.2 (15 Ata 2022 07:13), Max: 98.7 (15 Ata 2022 00:04)  HR: 81 (15 Ata 2022 07:13) (64 - 90)  BP: 110/71 (15 Ata 2022 07:13) (110/71 - 135/61)  BP(mean): --  RR: 18 (15 Ata 2022 07:13) (18 - 18)  SpO2: 97% (15 Ata 2022 07:13) (94% - 97%)      PHYSICAL EXAM:    Constitutional: NAD, awake and alert, well-developed  HEENT: PERR, EOMI, Normal Hearing, MMM  Neck: Soft and supple, No LAD, No JVD  Respiratory: Breath sounds are clear bilaterally, No wheezing, rales or rhonchi  Cardiovascular: S1 and S2, regular rate and rhythm, no Murmurs, gallops or rubs  Gastrointestinal: Bowel Sounds present, soft, nontender, nondistended, no guarding, no rebound  Extremities: No peripheral edema  Vascular: 2+ peripheral pulses  Neurological: A/O x 3, no focal deficits  Musculoskeletal: 5/5 strength b/l upper and lower extremities  Skin: No rashes    MEDICATIONS:  MEDICATIONS  (STANDING):  LORazepam   Injectable 1 milliGRAM(s) IV Push once  pantoprazole  Injectable 40 milliGRAM(s) IV Push two times a day  timolol 0.5% Solution 1 Drop(s) Both EYES two times a day      LABS: All Labs Reviewed:                        13.1   12.92 )-----------( 193      ( 15 Ata 2022 07:31 )             38.9     06-15    144  |  108  |  43<H>  ----------------------------<  110<H>  3.9   |  31  |  1.74<H>    Ca    10.8<H>      15 Ata 2022 07:31    TPro  7.8  /  Alb  3.5  /  TBili  0.5  /  DBili  x   /  AST  28  /  ALT  19  /  AlkPhos  142<H>  06-14    PT/INR - ( 14 Jun 2022 00:06 )   PT: 12.5 sec;   INR: 1.08 ratio  PTT - ( 14 Jun 2022 00:06 )  PTT:29.6 sec      RADIOLOGY/EKG:  < from: Xray Chest 1 View-PORTABLE IMMEDIATE (Xray Chest 1 View-PORTABLE IMMEDIATE .) (06.14.22 @ 07:57) >    IMPRESSION: Quite large hiatal hernia and pacemaker again noted. Final   film shows successful placement of NG tube.    --- End of Report ---            TAMIKO CARTER MD; Attending Radiologist  This document has been electronically signed. Jun 14 2022 11:23AM    < end of copied text >           Patient is a 89y old  Male who presents with a chief complaint of Vomiting (14 Jun 2022 13:47)      SUBJECTIVE:   HPI:  89 y/o male w/ pmhx of Alzheimer dementia, HTN, HLD, DM 2, presenting for eval of coffee ground emesis for the past 24 hours.   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  In Ed -  attempted at decompression of the stomach with NG tube pt pulled out, received IV zofran ativan, placed on soft wrist restraints and re-inserted.     GOC - MOLST in place, confirmed DNR/ DNI/ No Feeding tube.    (14 Jun 2022 11:08)      6/15; sitting up ion bed, pulled out NGT again today, had GOC conversation with daughter - plans for comfort measures upon dc. pt denies pain nausea or discomfort. only c/o hunger       REVIEW OF SYSTEMS:    CONSTITUTIONAL: No weakness, fevers or chills  EYES/ENT: No visual changes;  No vertigo or throat pain   NECK: No pain or stiffness  RESPIRATORY: No cough, wheezing, hemoptysis; No shortness of breath  CARDIOVASCULAR: No chest pain or palpitations  GASTROINTESTINAL: No abdominal or epigastric pain. No nausea, vomiting, or hematemesis; No diarrhea or constipation. No melena or hematochezia.  GENITOURINARY: No dysuria, frequency or hematuria  NEUROLOGICAL: No numbness or weakness  SKIN: No itching, burning, rashes, or lesions   All other review of systems is negative unless indicated above      Weight (kg): 53.3 (06-14 @ 10:08)    Vital Signs Last 24 Hrs  T(C): 36.2 (15 Ata 2022 07:13), Max: 37.1 (15 Ata 2022 00:04)  T(F): 97.2 (15 Ata 2022 07:13), Max: 98.7 (15 Ata 2022 00:04)  HR: 81 (15 Ata 2022 07:13) (64 - 90)  BP: 110/71 (15 Ata 2022 07:13) (110/71 - 135/61)  BP(mean): --  RR: 18 (15 Ata 2022 07:13) (18 - 18)  SpO2: 97% (15 Ata 2022 07:13) (94% - 97%)        PHYSICAL EXAM:  GENERAL: eldery male lethargic,   HEENT:  Dry mucous membranes, Left nare NG tube in plcae pupils equal and reactive, EOMI, no oropharyngeal lesions, erythema, exudates, oral thrush  CV:  +S1, +S2, regular, no murmurs or rubs  RESP:   lungs clear to auscultation bilaterally, no wheezing, rales, rhonchi, good air entry bilaterally  BREAST:  not examined  GI:  abdomen soft, tender, non-distended, normal BS, no bruits, no abdominal masses, no palpable masses  MSK:   normal muscle tone, no atrophy, no rigidity, no contractions  EXT:  no clubbing, no cyanosis, no edema, no calf pain, swelling or erythema  VASCULAR:  pulses equal and symmetric in the upper and lower extremities  NEURO:  AAOX3, no focal neurological deficits, follows all commands, able to move extremities spontaneously  SKIN:  no ulcers, lesions or rashes        MEDICATIONS:  MEDICATIONS  (STANDING):  LORazepam   Injectable 1 milliGRAM(s) IV Push once  pantoprazole  Injectable 40 milliGRAM(s) IV Push two times a day  timolol 0.5% Solution 1 Drop(s) Both EYES two times a day      LABS: All Labs Reviewed:                        13.1   12.92 )-----------( 193      ( 15 Ata 2022 07:31 )             38.9     06-15    144  |  108  |  43<H>  ----------------------------<  110<H>  3.9   |  31  |  1.74<H>    Ca    10.8<H>      15 Ata 2022 07:31    TPro  7.8  /  Alb  3.5  /  TBili  0.5  /  DBili  x   /  AST  28  /  ALT  19  /  AlkPhos  142<H>  06-14    PT/INR - ( 14 Jun 2022 00:06 )   PT: 12.5 sec;   INR: 1.08 ratio  PTT - ( 14 Jun 2022 00:06 )  PTT:29.6 sec      RADIOLOGY/EKG:  < from: Xray Chest 1 View-PORTABLE IMMEDIATE (Xray Chest 1 View-PORTABLE IMMEDIATE .) (06.14.22 @ 07:57) >    IMPRESSION: Quite large hiatal hernia and pacemaker again noted. Final   film shows successful placement of NG tube.    --- End of Report ---            TAMIKO CARTER MD; Attending Radiologist  This document has been electronically signed. Jun 14 2022 11:23AM    < end of copied text >

## 2022-06-15 NOTE — PROVIDER CONTACT NOTE (OTHER) - BACKGROUND
wrist restraints placed to prevent pulling, but patient still was able to rip it out
Pt is admitted for GI hemorrhage managed also for bowel obstruction

## 2022-06-15 NOTE — PROGRESS NOTE ADULT - ASSESSMENT
Pt is a 89y old Male with hx of  Alzheimer dementia, HTN, HLD, DM 2, presenting for eval of coffee ground emesis for the past 24 hours.  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 In Ed -  attempted at decompression of the stomach with NG tube pt pulled out, received IV Zofran ativan, placed on soft wrist restraints and re-inserted. Palliative medicine consulted to help establish GOC and advance care planning     1) Dementia/debility  -  PPSV2: 20   %  - FAST: 7c  - supportive care     2) Gastric outlet obstruction  - surgery consult appreciated   - CT abdomen plevis noted   - NGT tube inplace    3) advance care planning  - patient does not have capacity   - MOLST DNR/I on chart   - GOC meeting held today please refer to note  Pt is a 89y old Male with hx of  Alzheimer dementia, HTN, HLD, DM 2, presenting for eval of coffee ground emesis for the past 24 hours.  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 In Ed -  attempted at decompression of the stomach with NG tube pt pulled out, received IV Zofran ativan, placed on soft wrist restraints and re-inserted. Palliative medicine consulted to help establish GOC and advance care planning     1) Dementia/debility  -  PPSV2: 20   %  - FAST: 7c  - supportive care     2) Gastric outlet obstruction  - surgery consult appreciated   - CT abdomen plevis noted   - patient removed NGT - at this time patient appears comfortable will monitor how patient does with out, patients daughter would like to focus on comfort     3) advance care planning  - patient does not have capacity   - MOLST DNR/I on chart   - GOC meeting held today please refer to note   - plan for hospice at [facility] [place] - discussed with Dr. Velasquez, Maki Ribeiro, Surgery, and SW

## 2022-06-16 ENCOUNTER — NON-APPOINTMENT (OUTPATIENT)
Age: 87
End: 2022-06-16

## 2022-06-16 ENCOUNTER — TRANSCRIPTION ENCOUNTER (OUTPATIENT)
Age: 87
End: 2022-06-16

## 2022-06-16 VITALS
SYSTOLIC BLOOD PRESSURE: 125 MMHG | RESPIRATION RATE: 18 BRPM | HEART RATE: 61 BPM | TEMPERATURE: 97 F | DIASTOLIC BLOOD PRESSURE: 60 MMHG | OXYGEN SATURATION: 96 %

## 2022-06-16 PROCEDURE — 99239 HOSP IP/OBS DSCHRG MGMT >30: CPT

## 2022-06-16 RX ORDER — AMLODIPINE BESYLATE 2.5 MG/1
1 TABLET ORAL
Qty: 0 | Refills: 0 | DISCHARGE

## 2022-06-16 RX ORDER — VALSARTAN 80 MG/1
1 TABLET ORAL
Qty: 0 | Refills: 0 | DISCHARGE

## 2022-06-16 RX ORDER — FINASTERIDE 5 MG/1
1 TABLET, FILM COATED ORAL
Qty: 0 | Refills: 0 | DISCHARGE

## 2022-06-16 RX ORDER — MORPHINE SULFATE 50 MG/1
2 CAPSULE, EXTENDED RELEASE ORAL
Qty: 0 | Refills: 0 | DISCHARGE
Start: 2022-06-16

## 2022-06-16 RX ORDER — GLUCAGON INJECTION, SOLUTION 0.5 MG/.1ML
0 INJECTION, SOLUTION SUBCUTANEOUS
Qty: 0 | Refills: 0 | DISCHARGE

## 2022-06-16 RX ORDER — LANOLIN ALCOHOL/MO/W.PET/CERES
1 CREAM (GRAM) TOPICAL
Qty: 0 | Refills: 0 | DISCHARGE
Start: 2022-06-16

## 2022-06-16 RX ADMIN — PANTOPRAZOLE SODIUM 40 MILLIGRAM(S): 20 TABLET, DELAYED RELEASE ORAL at 09:32

## 2022-06-16 RX ADMIN — Medication 1 DROP(S): at 09:32

## 2022-06-16 NOTE — DISCHARGE NOTE PROVIDER - NSDCMRMEDTOKEN_GEN_ALL_CORE_FT
acetaminophen 500 mg oral tablet: 1 tab(s) orally every 4 hours, As Needed for minor pain/ temp &gt; 101  Artificial Tears ophthalmic solution: 1 drop(s) to each affected eye 2 times a day  atorvastatin 40 mg oral tablet: 1 tab(s) orally once a day (at bedtime)  furosemide 20 mg oral tablet: 1 tab(s) orally every other day  LORazepam: 1 milligram(s) orally every 6 hours, As Needed  melatonin 3 mg oral tablet: 1 tab(s) orally once a day (at bedtime), As needed, Insomnia  MiraLax oral powder for reconstitution: 17 gram(s) orally once a day  morphine: 2 milligram(s) orally every 6 hours, As Needed  pantoprazole 40 mg oral delayed release tablet: 1 tab(s) orally once a day  Senna Plus 50 mg-8.6 mg oral tablet: 2 tab(s) orally 2 times a day  tamsulosin 0.4 mg oral capsule: 1 cap(s) orally once a day (at bedtime)  timolol maleate 0.5% ophthalmic solution: 1 drop(s) to each affected eye 2 times a day

## 2022-06-16 NOTE — DISCHARGE NOTE PROVIDER - NSDCCPCAREPLAN_GEN_ALL_CORE_FT
PRINCIPAL DISCHARGE DIAGNOSIS  Diagnosis: GIB (gastrointestinal bleeding)  Assessment and Plan of Treatment: •Hospice is a service that is designed to provide people who are terminally ill and their families with medical, spiritual, and psychological support.  •Hospice aims to improve your quality of life by keeping you as comfortable as possible in the final stages of life.  •Hospice teams often include a doctor, nurse, , counselor, ,dietitian, therapists, and volunteers.  •Hospice care generally includes medicine for symptom management, visits from doctors and nurses, physical and occupational therapy, nutrition counseling, spiritual and emotional counseling, caregiver support, and bereavement support for grieving family members.  •Hospice programs can take place in your home or at a hospice center, hospital, or skilled nursing facility.   You are being discharged home with Hospice services and it is recommended to focus on comfort measures and supportive care. Continue with medications prescribed for pain and other symptom control. If you have questions or concerns you may contact the Hospice service line by calling [phone].      SECONDARY DISCHARGE DIAGNOSES  Diagnosis: Obstructed, gastric outlet  Assessment and Plan of Treatment:

## 2022-06-16 NOTE — DISCHARGE NOTE NURSING/CASE MANAGEMENT/SOCIAL WORK - PATIENT PORTAL LINK FT
You can access the FollowMyHealth Patient Portal offered by [facility] by registering at the following website: [email or web address]. By joining [facility]’s FollowMyHealth portal, you will also be able to view your health information using other applications (apps) compatible with our system.

## 2022-06-16 NOTE — PROVIDER CONTACT NOTE (OTHER) - SITUATION
doctor to doctor call dr Ren already to spoke to doctor
pt admitted for coffee ground emesis. has NG tube set to suction for mechanical bowel obstruction.
patient resides at [facility]
Patient pulled his ngt, he has mechanical bowel obstruction. Ngt was connected to low suction prior to pt pulling. Hospitalist came and primary nurse tried to insert a new ngt but pt was unsuccesful

## 2022-06-16 NOTE — DISCHARGE NOTE PROVIDER - CARE PROVIDER_API CALL
Steven Ruelas [id number] [place] [address]  Phone: [phone]  Fax: [phone]  Follow Up Time:

## 2022-06-16 NOTE — DISCHARGE NOTE PROVIDER - HOSPITAL COURSE
HPI:  89 y/o male w/ pmhx of Alzheimer dementia, HTN, HLD, DM 2, presenting for eval of coffee ground emesis for the past 24 hours.   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  In Ed -  attempted at decompression of the stomach with NG tube pt pulled out, received IV zofran ativan, placed on soft wrist restraints and re-inserted.     GOC - MOLST in place, confirmed DNR/ DNI/ No Feeding tube.    (14 Jun 2022 11:08)      pt admitted for acute volvulus - had NG tube placed x 3 , self extracted . vomiting resolved. pt evaled by GI and Surgery - plans initially for OR with placement of PEG for long term management. pt evaled by Pall care Team - prior to admission pt was DNR/DNI/ no feeding tube . daughter Radha Anderson [phone]  Daughter desires her father to remain comfortable and is understanding of his medical status.declined surgical intervention to management. pt being dc'd on home hospice.      see progress note for PE, vitals, results of lab and imaging     A&P   # acute mechanical vovlulus   - CT scan as above   - pulled NG tube out despite restraints. - no plans to re-insert   - s/p wrist restraints to prevent pulling of tube   - surgical consult - no surgical intervention   - GI consult - no intervention or scoping   - antiemetics and analgesia PRN   - 1mg ativan, morphine haldol  - s/p PPI   - puree diet  - comfort measures     # Alzheimer dementia / HTN / HLD / DM 2   - supportive care   - holding bp meds and hld meds given comfort measure approach     vte   - SCD - holding AC     above plan discussed with pt, daughter at bedside, bedside RN, and MD Velasquez     - [facility] meeting  plan for hospice at [facility] vs inpatient if vomiting     - long discussion with daughter at bedside. she reports pt has declined significantly over past year, and expressed concern over surgical outcomes being that if surgery is offered pt may have PEG tube which is not in line with MOLST, may had exlap due to advanced vovulus and may ultimately suffer more than he currently is. I have discussed my recommendation for comfort focused approached given advanced age and comorbidities. she reports pt house dr at [facility] also has similar recommendations. at this time, she wishes that we do not pursue surgical interventions and will dc to hospice.     spent ___43_ mins preparing dc.   above plan discussed with pt, bedside RN, and MD Velasquez HPI:  89 y/o male w/ pmhx of Alzheimer dementia, HTN, HLD, DM 2, presenting for eval of coffee ground emesis for the past 24 hours.   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  In Ed -  attempted at decompression of the stomach with NG tube pt pulled out, received IV zofran ativan, placed on soft wrist restraints and re-inserted.     GOC - MOLST in place, confirmed DNR/ DNI/ No Feeding tube.    (14 Jun 2022 11:08)      pt admitted for acute volvulus - had NG tube placed x 3 , self extracted . vomiting resolved. pt evaled by GI and Surgery - plans initially for OR with placement of PEG for long term management. pt evaled by Pall care Team - prior to admission pt was DNR/DNI/ no feeding tube . daughter Radha Anderson [phone]  Daughter desires her father to remain comfortable and is understanding of his medical status.declined surgical intervention to management. pt being dc'd on home hospice.      see progress note for PE, vitals, results of lab and imaging     A&P   # acute mechanical vovlulus   - CT scan as above   - pulled NG tube out despite restraints. - no plans to re-insert   - s/p wrist restraints to prevent pulling of tube   - surgical consult - no surgical intervention   - GI consult - no intervention or scoping   - antiemetics and analgesia PRN   - 1mg ativan, morphine haldol  - s/p PPI   - puree diet  - comfort measures     # Alzheimer dementia / HTN / HLD / DM 2   - supportive care   - holding bp meds and hld meds given comfort measure approach     vte   - SCD - holding AC     above plan discussed with pt, daughter at bedside, bedside RN, and MD Velasquez     - [facility] meeting  plan for hospice at [facility] vs inpatient if vomiting     - long discussion with daughter at bedside. she reports pt has declined significantly over past year, and expressed concern over surgical outcomes being that if surgery is offered pt may have PEG tube which is not in line with MOLST, may had exlap due to advanced vovulus and may ultimately suffer more than he currently is. I have discussed my recommendation for comfort focused approached given advanced age and comorbidities. she reports pt house dr at [facility] also has similar recommendations. at this time, she wishes that we do not pursue surgical interventions and will dc to hospice.     spent ___43_ mins preparing dc.   above plan discussed with pt, bedside RN, and MD Velasquez     Attending note: Patient seen and examined with IGNACIO Ribeiro  Case reviewed and discussed with her and necessary changes were made  Agree with her assessment and d/c plan.

## 2022-06-16 NOTE — DISCHARGE NOTE PROVIDER - NSDCPNSUBOBJ_GEN_ALL_CORE
All 10 systems reviewed and found to be negative with the exception of what has been described above.    Vital Signs Last 24 Hrs  T(C): 36.3 (16 Jun 2022 07:08), Max: 36.3 (16 Jun 2022 07:08)  T(F): 97.4 (16 Jun 2022 07:08), Max: 97.4 (16 Jun 2022 07:08)  HR: 61 (16 Jun 2022 07:08) (61 - 61)  BP: 125/60 (16 Jun 2022 07:08) (125/60 - 125/60)  BP(mean): --  RR: 18 (16 Jun 2022 07:08) (18 - 18)  SpO2: 96% (16 Jun 2022 07:08) (96% - 96%)      PHYSICAL EXAM:  GENERAL: eldery male lethargic,   HEENT:  Dry mucous membranes, Left nare NG tube in plcae pupils equal and reactive, EOMI, no oropharyngeal lesions, erythema, exudates, oral thrush  CV:  +S1, +S2, regular, no murmurs or rubs  RESP:   lungs clear to auscultation bilaterally, no wheezing, rales, rhonchi, good air entry bilaterally  BREAST:  not examined  GI:  abdomen soft, tender, non-distended, normal BS, no bruits, no abdominal masses, no palpable masses  MSK:   normal muscle tone, no atrophy, no rigidity, no contractions  EXT:  no clubbing, no cyanosis, no edema, no calf pain, swelling or erythema  VASCULAR:  pulses equal and symmetric in the upper and lower extremities  NEURO:  AAOX3, no focal neurological deficits, follows all commands, able to move extremities spontaneously  SKIN:  no ulcers, lesions or rashes      LABS: All Labs Reviewed:                        13.1   12.92 )-----------( 193      ( 15 Ata 2022 07:31 )             38.9     06-15    144  |  108  |  43<H>  ----------------------------<  110<H>  3.9   |  31  |  1.74<H>    Ca    10.8<H>      15 Ata 2022 07:31    TPro  7.8  /  Alb  3.5  /  TBili  0.5  /  DBili  x   /  AST  28  /  ALT  19  /  AlkPhos  142<H>  06-14    PT/INR - ( 14 Jun 2022 00:06 )   PT: 12.5 sec;   INR: 1.08 ratio  PTT - ( 14 Jun 2022 00:06 )  PTT:29.6 sec      RADIOLOGY/EKG:  < from: Xray Chest 1 View-PORTABLE IMMEDIATE (Xray Chest 1 View-PORTABLE IMMEDIATE .) (06.14.22 @ 07:57) >    IMPRESSION: Quite large hiatal hernia and pacemaker again noted. Final   film shows successful placement of NG tube.    --- End of Report ---

## 2022-06-16 NOTE — DISCHARGE NOTE NURSING/CASE MANAGEMENT/SOCIAL WORK - NSDCPEFALRISK_GEN_ALL_CORE
For information on Fall & Injury Prevention, visit: [email or web address] OR  [email or web address] OR  https://www.cdc.gov/steadi/patient.html

## 2022-06-21 DIAGNOSIS — Z20.822 CONTACT WITH AND (SUSPECTED) EXPOSURE TO COVID-19: ICD-10-CM

## 2022-06-21 DIAGNOSIS — K86.89 OTHER SPECIFIED DISEASES OF PANCREAS: ICD-10-CM

## 2022-06-21 DIAGNOSIS — Z88.0 ALLERGY STATUS TO PENICILLIN: ICD-10-CM

## 2022-06-21 DIAGNOSIS — F02.81 DEMENTIA IN OTHER DISEASES CLASSIFIED ELSEWHERE, UNSPECIFIED SEVERITY, WITH BEHAVIORAL DISTURBANCE: ICD-10-CM

## 2022-06-21 DIAGNOSIS — I10 ESSENTIAL (PRIMARY) HYPERTENSION: ICD-10-CM

## 2022-06-21 DIAGNOSIS — H40.9 UNSPECIFIED GLAUCOMA: ICD-10-CM

## 2022-06-21 DIAGNOSIS — E11.9 TYPE 2 DIABETES MELLITUS WITHOUT COMPLICATIONS: ICD-10-CM

## 2022-06-21 DIAGNOSIS — K56.2 VOLVULUS: ICD-10-CM

## 2022-06-21 DIAGNOSIS — G30.9 ALZHEIMER'S DISEASE, UNSPECIFIED: ICD-10-CM

## 2022-06-21 DIAGNOSIS — K31.1 ADULT HYPERTROPHIC PYLORIC STENOSIS: ICD-10-CM

## 2022-06-21 DIAGNOSIS — Z66 DO NOT RESUSCITATE: ICD-10-CM

## 2022-06-21 DIAGNOSIS — Z78.1 PHYSICAL RESTRAINT STATUS: ICD-10-CM

## 2022-06-21 DIAGNOSIS — E78.5 HYPERLIPIDEMIA, UNSPECIFIED: ICD-10-CM

## 2022-08-16 ENCOUNTER — APPOINTMENT (OUTPATIENT)
Dept: ELECTROPHYSIOLOGY | Facility: CLINIC | Age: 87
End: 2022-08-16

## 2022-08-24 ENCOUNTER — EMERGENCY (EMERGENCY)
Facility: HOSPITAL | Age: 87
LOS: 0 days | Discharge: HOME | End: 2022-08-24
Attending: EMERGENCY MEDICINE
Payer: MEDICARE

## 2022-08-24 VITALS
RESPIRATION RATE: 17 BRPM | SYSTOLIC BLOOD PRESSURE: 143 MMHG | OXYGEN SATURATION: 100 % | HEART RATE: 60 BPM | DIASTOLIC BLOOD PRESSURE: 64 MMHG | TEMPERATURE: 98 F

## 2022-08-24 VITALS
RESPIRATION RATE: 19 BRPM | TEMPERATURE: 98 F | DIASTOLIC BLOOD PRESSURE: 69 MMHG | HEART RATE: 59 BPM | HEIGHT: 64 IN | SYSTOLIC BLOOD PRESSURE: 149 MMHG | OXYGEN SATURATION: 98 %

## 2022-08-24 DIAGNOSIS — I12.9 HYPERTENSIVE CHRONIC KIDNEY DISEASE WITH STAGE 1 THROUGH STAGE 4 CHRONIC KIDNEY DISEASE, OR UNSPECIFIED CHRONIC KIDNEY DISEASE: ICD-10-CM

## 2022-08-24 DIAGNOSIS — K59.00 CONSTIPATION, UNSPECIFIED: ICD-10-CM

## 2022-08-24 DIAGNOSIS — I73.9 PERIPHERAL VASCULAR DISEASE, UNSPECIFIED: ICD-10-CM

## 2022-08-24 DIAGNOSIS — R30.9 PAINFUL MICTURITION, UNSPECIFIED: ICD-10-CM

## 2022-08-24 DIAGNOSIS — Z20.822 CONTACT WITH AND (SUSPECTED) EXPOSURE TO COVID-19: ICD-10-CM

## 2022-08-24 DIAGNOSIS — R10.9 UNSPECIFIED ABDOMINAL PAIN: ICD-10-CM

## 2022-08-24 DIAGNOSIS — N18.9 CHRONIC KIDNEY DISEASE, UNSPECIFIED: ICD-10-CM

## 2022-08-24 DIAGNOSIS — R39.15 URGENCY OF URINATION: ICD-10-CM

## 2022-08-24 DIAGNOSIS — E78.5 HYPERLIPIDEMIA, UNSPECIFIED: ICD-10-CM

## 2022-08-24 DIAGNOSIS — Z88.0 ALLERGY STATUS TO PENICILLIN: ICD-10-CM

## 2022-08-24 DIAGNOSIS — Z98.890 OTHER SPECIFIED POSTPROCEDURAL STATES: Chronic | ICD-10-CM

## 2022-08-24 DIAGNOSIS — F03.90 UNSPECIFIED DEMENTIA WITHOUT BEHAVIORAL DISTURBANCE: ICD-10-CM

## 2022-08-24 DIAGNOSIS — K44.9 DIAPHRAGMATIC HERNIA WITHOUT OBSTRUCTION OR GANGRENE: ICD-10-CM

## 2022-08-24 DIAGNOSIS — E11.22 TYPE 2 DIABETES MELLITUS WITH DIABETIC CHRONIC KIDNEY DISEASE: ICD-10-CM

## 2022-08-24 DIAGNOSIS — N40.0 BENIGN PROSTATIC HYPERPLASIA WITHOUT LOWER URINARY TRACT SYMPTOMS: ICD-10-CM

## 2022-08-24 LAB
ALBUMIN SERPL ELPH-MCNC: 2.9 G/DL — LOW (ref 3.3–5)
ALP SERPL-CCNC: 79 U/L — SIGNIFICANT CHANGE UP (ref 40–120)
ALT FLD-CCNC: 22 U/L — SIGNIFICANT CHANGE UP (ref 12–78)
ANION GAP SERPL CALC-SCNC: 4 MMOL/L — LOW (ref 5–17)
APPEARANCE UR: CLEAR — SIGNIFICANT CHANGE UP
AST SERPL-CCNC: 20 U/L — SIGNIFICANT CHANGE UP (ref 15–37)
BASOPHILS # BLD AUTO: 0.03 K/UL — SIGNIFICANT CHANGE UP (ref 0–0.2)
BASOPHILS NFR BLD AUTO: 0.4 % — SIGNIFICANT CHANGE UP (ref 0–2)
BILIRUB SERPL-MCNC: 0.5 MG/DL — SIGNIFICANT CHANGE UP (ref 0.2–1.2)
BILIRUB UR-MCNC: NEGATIVE — SIGNIFICANT CHANGE UP
BUN SERPL-MCNC: 28 MG/DL — HIGH (ref 7–23)
CALCIUM SERPL-MCNC: 9.8 MG/DL — SIGNIFICANT CHANGE UP (ref 8.5–10.1)
CHLORIDE SERPL-SCNC: 108 MMOL/L — SIGNIFICANT CHANGE UP (ref 96–108)
CO2 SERPL-SCNC: 28 MMOL/L — SIGNIFICANT CHANGE UP (ref 22–31)
COLOR SPEC: YELLOW — SIGNIFICANT CHANGE UP
CREAT SERPL-MCNC: 1.44 MG/DL — HIGH (ref 0.5–1.3)
DIFF PNL FLD: NEGATIVE — SIGNIFICANT CHANGE UP
EGFR: 45 ML/MIN/1.73M2 — LOW
EOSINOPHIL # BLD AUTO: 0.07 K/UL — SIGNIFICANT CHANGE UP (ref 0–0.5)
EOSINOPHIL NFR BLD AUTO: 1 % — SIGNIFICANT CHANGE UP (ref 0–6)
FLUAV AG NPH QL: SIGNIFICANT CHANGE UP
FLUBV AG NPH QL: SIGNIFICANT CHANGE UP
GLUCOSE SERPL-MCNC: 94 MG/DL — SIGNIFICANT CHANGE UP (ref 70–99)
GLUCOSE UR QL: NEGATIVE — SIGNIFICANT CHANGE UP
HCT VFR BLD CALC: 36.9 % — LOW (ref 39–50)
HGB BLD-MCNC: 12.1 G/DL — LOW (ref 13–17)
IMM GRANULOCYTES NFR BLD AUTO: 0.3 % — SIGNIFICANT CHANGE UP (ref 0–1.5)
KETONES UR-MCNC: NEGATIVE — SIGNIFICANT CHANGE UP
LEUKOCYTE ESTERASE UR-ACNC: NEGATIVE — SIGNIFICANT CHANGE UP
LIDOCAIN IGE QN: 923 U/L — HIGH (ref 73–393)
LYMPHOCYTES # BLD AUTO: 0.88 K/UL — LOW (ref 1–3.3)
LYMPHOCYTES # BLD AUTO: 12.7 % — LOW (ref 13–44)
MCHC RBC-ENTMCNC: 32.5 PG — SIGNIFICANT CHANGE UP (ref 27–34)
MCHC RBC-ENTMCNC: 32.8 GM/DL — SIGNIFICANT CHANGE UP (ref 32–36)
MCV RBC AUTO: 99.2 FL — SIGNIFICANT CHANGE UP (ref 80–100)
MONOCYTES # BLD AUTO: 0.71 K/UL — SIGNIFICANT CHANGE UP (ref 0–0.9)
MONOCYTES NFR BLD AUTO: 10.2 % — SIGNIFICANT CHANGE UP (ref 2–14)
NEUTROPHILS # BLD AUTO: 5.24 K/UL — SIGNIFICANT CHANGE UP (ref 1.8–7.4)
NEUTROPHILS NFR BLD AUTO: 75.4 % — SIGNIFICANT CHANGE UP (ref 43–77)
NITRITE UR-MCNC: NEGATIVE — SIGNIFICANT CHANGE UP
PH UR: 6 — SIGNIFICANT CHANGE UP (ref 5–8)
PLATELET # BLD AUTO: 152 K/UL — SIGNIFICANT CHANGE UP (ref 150–400)
POTASSIUM SERPL-MCNC: 4.4 MMOL/L — SIGNIFICANT CHANGE UP (ref 3.5–5.3)
POTASSIUM SERPL-SCNC: 4.4 MMOL/L — SIGNIFICANT CHANGE UP (ref 3.5–5.3)
PROT SERPL-MCNC: 6.2 GM/DL — SIGNIFICANT CHANGE UP (ref 6–8.3)
PROT UR-MCNC: NEGATIVE — SIGNIFICANT CHANGE UP
RBC # BLD: 3.72 M/UL — LOW (ref 4.2–5.8)
RBC # FLD: 13.4 % — SIGNIFICANT CHANGE UP (ref 10.3–14.5)
RSV RNA NPH QL NAA+NON-PROBE: SIGNIFICANT CHANGE UP
SARS-COV-2 RNA SPEC QL NAA+PROBE: SIGNIFICANT CHANGE UP
SODIUM SERPL-SCNC: 140 MMOL/L — SIGNIFICANT CHANGE UP (ref 135–145)
SP GR SPEC: 1.01 — SIGNIFICANT CHANGE UP (ref 1.01–1.02)
TROPONIN I, HIGH SENSITIVITY RESULT: 9.22 NG/L — SIGNIFICANT CHANGE UP
UROBILINOGEN FLD QL: NEGATIVE — SIGNIFICANT CHANGE UP
WBC # BLD: 6.95 K/UL — SIGNIFICANT CHANGE UP (ref 3.8–10.5)
WBC # FLD AUTO: 6.95 K/UL — SIGNIFICANT CHANGE UP (ref 3.8–10.5)

## 2022-08-24 PROCEDURE — U0003: CPT

## 2022-08-24 PROCEDURE — 96361 HYDRATE IV INFUSION ADD-ON: CPT

## 2022-08-24 PROCEDURE — G1004: CPT

## 2022-08-24 PROCEDURE — 99285 EMERGENCY DEPT VISIT HI MDM: CPT

## 2022-08-24 PROCEDURE — 83690 ASSAY OF LIPASE: CPT

## 2022-08-24 PROCEDURE — 74177 CT ABD & PELVIS W/CONTRAST: CPT | Mod: 26,ME

## 2022-08-24 PROCEDURE — 87086 URINE CULTURE/COLONY COUNT: CPT

## 2022-08-24 PROCEDURE — 0241U: CPT

## 2022-08-24 PROCEDURE — 36415 COLL VENOUS BLD VENIPUNCTURE: CPT

## 2022-08-24 PROCEDURE — 84484 ASSAY OF TROPONIN QUANT: CPT

## 2022-08-24 PROCEDURE — 96374 THER/PROPH/DIAG INJ IV PUSH: CPT | Mod: XU

## 2022-08-24 PROCEDURE — 80053 COMPREHEN METABOLIC PANEL: CPT

## 2022-08-24 PROCEDURE — 71045 X-RAY EXAM CHEST 1 VIEW: CPT | Mod: 26

## 2022-08-24 PROCEDURE — 93005 ELECTROCARDIOGRAM TRACING: CPT

## 2022-08-24 PROCEDURE — 81003 URINALYSIS AUTO W/O SCOPE: CPT

## 2022-08-24 PROCEDURE — 74177 CT ABD & PELVIS W/CONTRAST: CPT | Mod: ME

## 2022-08-24 PROCEDURE — U0005: CPT

## 2022-08-24 PROCEDURE — 85025 COMPLETE CBC W/AUTO DIFF WBC: CPT

## 2022-08-24 PROCEDURE — 93010 ELECTROCARDIOGRAM REPORT: CPT

## 2022-08-24 PROCEDURE — 99285 EMERGENCY DEPT VISIT HI MDM: CPT | Mod: 25

## 2022-08-24 PROCEDURE — 71045 X-RAY EXAM CHEST 1 VIEW: CPT

## 2022-08-24 RX ORDER — POLYETHYLENE GLYCOL 3350 17 G/17G
17 POWDER, FOR SOLUTION ORAL
Qty: 0 | Refills: 0 | DISCHARGE

## 2022-08-24 RX ORDER — FAMOTIDINE 10 MG/ML
20 INJECTION INTRAVENOUS ONCE
Refills: 0 | Status: COMPLETED | OUTPATIENT
Start: 2022-08-24 | End: 2022-08-24

## 2022-08-24 RX ORDER — SODIUM CHLORIDE 9 MG/ML
500 INJECTION INTRAMUSCULAR; INTRAVENOUS; SUBCUTANEOUS ONCE
Refills: 0 | Status: COMPLETED | OUTPATIENT
Start: 2022-08-24 | End: 2022-08-24

## 2022-08-24 RX ORDER — PANTOPRAZOLE SODIUM 20 MG/1
1 TABLET, DELAYED RELEASE ORAL
Qty: 0 | Refills: 0 | DISCHARGE

## 2022-08-24 RX ADMIN — FAMOTIDINE 20 MILLIGRAM(S): 10 INJECTION INTRAVENOUS at 15:28

## 2022-08-24 RX ADMIN — SODIUM CHLORIDE 500 MILLILITER(S): 9 INJECTION INTRAMUSCULAR; INTRAVENOUS; SUBCUTANEOUS at 18:07

## 2022-08-24 RX ADMIN — SODIUM CHLORIDE 500 MILLILITER(S): 9 INJECTION INTRAMUSCULAR; INTRAVENOUS; SUBCUTANEOUS at 15:28

## 2022-08-24 NOTE — ED PROVIDER NOTE - PATIENT PORTAL LINK FT
You can access the FollowMyHealth Patient Portal offered by [facility] by registering at the following website: [email or web address]. By joining [facility]’s FollowMyHealth portal, you will also be able to view your health information using other applications (apps) compatible with our system.

## 2022-08-24 NOTE — ED PROVIDER NOTE - GASTROINTESTINAL, MLM
Abdomen soft, non-tender, no guarding.No distension, no focal tender, BS hypoactive normal pitch. Abdomen soft, non-tender, no guarding.  No distension, no focal tender, BS hypoactive normal pitch.

## 2022-08-24 NOTE — ED PROVIDER NOTE - MUSCULOSKELETAL, MLM
Spine appears normal, range of motion is not limited, no muscle or joint tenderness.  HARDIN X 4, no focal extremity swelling/tender.

## 2022-08-24 NOTE — ED PROVIDER NOTE - CLINICAL SUMMARY MEDICAL DECISION MAKING FREE TEXT BOX
89 yo M, mult PMH incl. MOLST + DNR/DNI, dementia, prior volvulus, HTN, CKD, PVD, BPH, DMt2, HLD, h/o falls, BIBA from [facility] re: increased mental confusion, pallor, N, abd pain.  Pt sent to ED for r/o volvulus.  Pt denies any abd pain, N/V, has no active c/o's.  EMS reports NH informed them of + BM earlier today.  Pt denies BM today. Pt uncertain why he's here, + poor historian.  VSS.  Elderly M in NAD.  Abd soft, NT, ND, BS hypoactive, normal pitch, no CVAT.  Plan: labs incl. urine, CXR, EKG, CT A/P, cautious IVF, IV Pepcid.  Observe, reassess. 89 yo M, mult PMH incl. MOLST + DNR/DNI, dementia, prior volvulus, HTN, CKD, PVD, BPH, DMt2, HLD, h/o falls, BIBA from [facility] re: increased mental confusion, pallor, N, abd pain.  Pt sent to ED for r/o volvulus.  Pt denies any abd pain, N/V, has no active c/o's.  EMS reports NH informed them of + BM earlier today.  Pt denies BM today. Pt uncertain why he's here, + poor historian.  VSS.  Elderly M in NAD.  Abd soft, NT, ND, BS hypoactive, normal pitch, no CVAT, pt NOT confused/disoriented, + fully alert.  Plan: labs incl. urine, CXR, EKG, CT A/P, cautious IVF, IV Pepcid.  Observe, reassess.

## 2022-08-24 NOTE — ED PROVIDER NOTE - ENMT, MLM
NC/AT.  Airway patent, Nasal mucosa clear. Mouth with fairly moist mucosa. Throat has no vesicles, no oropharyngeal exudates and uvula is midline.

## 2022-08-24 NOTE — ED PROVIDER NOTE - OBJECTIVE STATEMENT
89 yo M, mult pMH incl. MOLST + DNR/DNI, dementia, pruior volvulus, HTN, CKD, PVD, BPH, DMt2, HLD, h/o falls, BIBA from [facility] re: increased mental confusion, pallor, N, abd pain. 89 yo M, mult PMH incl. MOLST + DNR/DNI, dementia, prior volvulus, HTN, CKD, PVD, BPH, DMt2, HLD, h/o falls, BIBA from [facility] re: increased mental confusion, pallor, N, abd pain.  Pt sent to ED for r/o volvulus.  Pt denies any abd pain, N/V, has no active c/o's.  EMS reports NH informed them of + BM earlier today.  Pt denies BM today.  Pt denies F/C, cough, cp, SOB.  Pt uncertain why he's here, + poor historian. 89 yo M, mult PMH incl. MOLST + DNR/DNI, dementia, prior volvulus, HTN, CKD, PVD, BPH, DMt2, HLD, h/o falls, BIBA from [facility] re: increased mental confusion, pallor, N, abd pain.  Pt sent to ED for r/o volvulus.  Pt denies any abd pain, N/V, has no active c/o's.  EMS reports NH informed them of + BM earlier today.  Pt denies BM today.  Pt denies F/C, cough, cp, SOB.  Pt uncertain why he's here, + poor historian.  Pt c/o's difficulty urinating, + urgency.

## 2022-08-24 NOTE — ED ADULT TRIAGE NOTE - CHIEF COMPLAINT QUOTE
Pt presents to ED from [facility] for abdominal pain. staff noted pt c/o upper abdominal pain, nausea yesterday and this morning. reporting normal BM. pt denying pain in ED. hx dementia. awake and alert.

## 2022-08-24 NOTE — ED ADULT NURSE REASSESSMENT NOTE - NS ED NURSE REASSESS COMMENT FT1
Patient provided with food at this time. Patient hungry and tolerated food. Family member at bedside. Safety and comfort measures maintained.

## 2022-08-24 NOTE — ED PROVIDER NOTE - NEUROLOGICAL, MLM
What Type Of Note Output Would You Prefer (Optional)?: Bullet Format How Severe Is Your Acne?: moderate Is This A New Presentation, Or A Follow-Up?: Acne Alert and oriented, no focal deficits, no motor or sensory deficits. Alert and oriented X 3, no focal deficits, no motor or sensory deficits.

## 2022-08-24 NOTE — ED PROVIDER NOTE - PROGRESS NOTE DETAILS
C MD Tk: CT negative for acute intra-abd pathology, + constipation, + hiatal hernia, no volvulus.  Informed by ED RN that pt passed po trial. C MD Tk: CT negative for acute intra-abd pathology, + constipation, + hiatal hernia, no volvulus.  Informed by ED RN that pt passed po trial.  ED  arranging for transport back to NH.

## 2022-08-24 NOTE — ED ADULT NURSE NOTE - CHIEF COMPLAINT QUOTE
Pt presents to ED from [facility] for abdominal pain. staff noted pt c/o upper abdominal pain, nausea yesterday and this morning. reporting normal BM. pt denying pain in ED. hx dementia. awake and alert.

## 2022-08-24 NOTE — ED ADULT NURSE NOTE - OBJECTIVE STATEMENT
Patient presents to the emergency room from [facility] with complaints of abdominal pain. Per staff, patient complained of upper abdominal pain and nausea. Patient states he had normal bowel movement yesterday, denies any pain at this time. Patient stated when he urinates he only urinates a little bit but is continent. Patient denies shortness of breath, fever, hematuria, fevers, vomiting, diarrhea.  used ID [id number], spoke with Guerline.

## 2022-08-24 NOTE — ED PROVIDER NOTE - NSFOLLOWUPINSTRUCTIONS_ED_ALL_ED_FT
Pt is NOT confused/disoriented.  CT abd/pelvis: + constipation, no volvulus, + sliding hiatal hernia.  Consider adding oral laxative to patient's regular medication regimen.  No indication for hospital admission.  Follow up with own doctor.

## 2022-08-25 LAB
CULTURE RESULTS: SIGNIFICANT CHANGE UP
SPECIMEN SOURCE: SIGNIFICANT CHANGE UP

## 2022-08-31 ENCOUNTER — INPATIENT (INPATIENT)
Facility: HOSPITAL | Age: 87
LOS: 3 days | Discharge: HOSPICE | DRG: 377 | End: 2022-09-04
Attending: INTERNAL MEDICINE | Admitting: HOSPITALIST
Payer: MEDICARE

## 2022-08-31 VITALS
HEIGHT: 64 IN | WEIGHT: 139.99 LBS | TEMPERATURE: 98 F | RESPIRATION RATE: 16 BRPM | DIASTOLIC BLOOD PRESSURE: 94 MMHG | HEART RATE: 81 BPM | OXYGEN SATURATION: 98 % | SYSTOLIC BLOOD PRESSURE: 191 MMHG

## 2022-08-31 DIAGNOSIS — Z98.890 OTHER SPECIFIED POSTPROCEDURAL STATES: Chronic | ICD-10-CM

## 2022-08-31 LAB
ALBUMIN SERPL ELPH-MCNC: 3.6 G/DL — SIGNIFICANT CHANGE UP (ref 3.3–5)
ALP SERPL-CCNC: 82 U/L — SIGNIFICANT CHANGE UP (ref 40–120)
ALT FLD-CCNC: 20 U/L — SIGNIFICANT CHANGE UP (ref 12–78)
ANION GAP SERPL CALC-SCNC: 6 MMOL/L — SIGNIFICANT CHANGE UP (ref 5–17)
APTT BLD: 31.3 SEC — SIGNIFICANT CHANGE UP (ref 27.5–35.5)
AST SERPL-CCNC: 13 U/L — LOW (ref 15–37)
BASOPHILS # BLD AUTO: 0.01 K/UL — SIGNIFICANT CHANGE UP (ref 0–0.2)
BASOPHILS NFR BLD AUTO: 0.1 % — SIGNIFICANT CHANGE UP (ref 0–2)
BILIRUB SERPL-MCNC: 0.5 MG/DL — SIGNIFICANT CHANGE UP (ref 0.2–1.2)
BUN SERPL-MCNC: 21 MG/DL — SIGNIFICANT CHANGE UP (ref 7–23)
CALCIUM SERPL-MCNC: 10.4 MG/DL — HIGH (ref 8.5–10.1)
CHLORIDE SERPL-SCNC: 103 MMOL/L — SIGNIFICANT CHANGE UP (ref 96–108)
CO2 SERPL-SCNC: 29 MMOL/L — SIGNIFICANT CHANGE UP (ref 22–31)
CREAT SERPL-MCNC: 1.41 MG/DL — HIGH (ref 0.5–1.3)
EGFR: 46 ML/MIN/1.73M2 — LOW
EOSINOPHIL # BLD AUTO: 0.01 K/UL — SIGNIFICANT CHANGE UP (ref 0–0.5)
EOSINOPHIL NFR BLD AUTO: 0.1 % — SIGNIFICANT CHANGE UP (ref 0–6)
GLUCOSE SERPL-MCNC: 150 MG/DL — HIGH (ref 70–99)
HCT VFR BLD CALC: 41.3 % — SIGNIFICANT CHANGE UP (ref 39–50)
HGB BLD-MCNC: 14.2 G/DL — SIGNIFICANT CHANGE UP (ref 13–17)
IMM GRANULOCYTES NFR BLD AUTO: 0.3 % — SIGNIFICANT CHANGE UP (ref 0–1.5)
INR BLD: 1.06 RATIO — SIGNIFICANT CHANGE UP (ref 0.88–1.16)
LACTATE SERPL-SCNC: 2.6 MMOL/L — HIGH (ref 0.7–2)
LIDOCAIN IGE QN: 392 U/L — SIGNIFICANT CHANGE UP (ref 73–393)
LYMPHOCYTES # BLD AUTO: 0.48 K/UL — LOW (ref 1–3.3)
LYMPHOCYTES # BLD AUTO: 6.9 % — LOW (ref 13–44)
MAGNESIUM SERPL-MCNC: 2.3 MG/DL — SIGNIFICANT CHANGE UP (ref 1.6–2.6)
MCHC RBC-ENTMCNC: 32.9 PG — SIGNIFICANT CHANGE UP (ref 27–34)
MCHC RBC-ENTMCNC: 34.4 GM/DL — SIGNIFICANT CHANGE UP (ref 32–36)
MCV RBC AUTO: 95.8 FL — SIGNIFICANT CHANGE UP (ref 80–100)
MONOCYTES # BLD AUTO: 0.25 K/UL — SIGNIFICANT CHANGE UP (ref 0–0.9)
MONOCYTES NFR BLD AUTO: 3.6 % — SIGNIFICANT CHANGE UP (ref 2–14)
NEUTROPHILS # BLD AUTO: 6.14 K/UL — SIGNIFICANT CHANGE UP (ref 1.8–7.4)
NEUTROPHILS NFR BLD AUTO: 89 % — HIGH (ref 43–77)
PLATELET # BLD AUTO: 176 K/UL — SIGNIFICANT CHANGE UP (ref 150–400)
POTASSIUM SERPL-MCNC: 4 MMOL/L — SIGNIFICANT CHANGE UP (ref 3.5–5.3)
POTASSIUM SERPL-SCNC: 4 MMOL/L — SIGNIFICANT CHANGE UP (ref 3.5–5.3)
PROT SERPL-MCNC: 7.2 GM/DL — SIGNIFICANT CHANGE UP (ref 6–8.3)
PROTHROM AB SERPL-ACNC: 12.3 SEC — SIGNIFICANT CHANGE UP (ref 10.5–13.4)
RBC # BLD: 4.31 M/UL — SIGNIFICANT CHANGE UP (ref 4.2–5.8)
RBC # FLD: 13.6 % — SIGNIFICANT CHANGE UP (ref 10.3–14.5)
SARS-COV-2 RNA SPEC QL NAA+PROBE: SIGNIFICANT CHANGE UP
SODIUM SERPL-SCNC: 138 MMOL/L — SIGNIFICANT CHANGE UP (ref 135–145)
TROPONIN I, HIGH SENSITIVITY RESULT: 10.02 NG/L — SIGNIFICANT CHANGE UP
WBC # BLD: 6.91 K/UL — SIGNIFICANT CHANGE UP (ref 3.8–10.5)
WBC # FLD AUTO: 6.91 K/UL — SIGNIFICANT CHANGE UP (ref 3.8–10.5)

## 2022-08-31 PROCEDURE — 74177 CT ABD & PELVIS W/CONTRAST: CPT | Mod: 26,MA

## 2022-08-31 PROCEDURE — 99285 EMERGENCY DEPT VISIT HI MDM: CPT

## 2022-08-31 PROCEDURE — 74018 RADEX ABDOMEN 1 VIEW: CPT | Mod: 26

## 2022-08-31 PROCEDURE — 71045 X-RAY EXAM CHEST 1 VIEW: CPT | Mod: 26

## 2022-08-31 PROCEDURE — 93010 ELECTROCARDIOGRAM REPORT: CPT

## 2022-08-31 RX ORDER — SODIUM CHLORIDE 9 MG/ML
1000 INJECTION INTRAMUSCULAR; INTRAVENOUS; SUBCUTANEOUS ONCE
Refills: 0 | Status: COMPLETED | OUTPATIENT
Start: 2022-08-31 | End: 2022-08-31

## 2022-08-31 RX ORDER — ONDANSETRON 8 MG/1
4 TABLET, FILM COATED ORAL ONCE
Refills: 0 | Status: COMPLETED | OUTPATIENT
Start: 2022-08-31 | End: 2022-08-31

## 2022-08-31 RX ORDER — MORPHINE SULFATE 50 MG/1
4 CAPSULE, EXTENDED RELEASE ORAL ONCE
Refills: 0 | Status: DISCONTINUED | OUTPATIENT
Start: 2022-08-31 | End: 2022-08-31

## 2022-08-31 NOTE — ED ADULT NURSE NOTE - OBJECTIVE STATEMENT
Patient has a history of Alzheimer's, poor historian of information. MD and I tried to use  but where unable to obtain information. Patient has a history of Alzheimer's, poor historian of information. MD and I tried to use  but where unable to obtain information. Patient presenting to the ER from [facility] with c/o vomiting. As per daughter "my father was seen here about 2 months ago for coffee ground emesis. At this time they found that he had a hiatal hernia. Last week he was seen here for pain in his abdomen and vomiting. Today he called me saying "I am going to die." He complained of pain above his heart and tonight he complained of pain in his abdomen. When I got to the nursing home this evening he had two towels next to his bed, 1 was soaked with dark brown vomit and the 2nd one had only a little on it. I am unaware if how many episodes of this vomiting he had today. Unsure if he is having dark stools as well." Patient has a pacemaker.

## 2022-08-31 NOTE — ED PROVIDER NOTE - PROGRESS NOTE DETAILS
Chart reviewed, patient with history of volvulus and coffee-ground emesis a few months ago.  At that time per GI note, they felt this was related to volvulus and not other underlying esophageal or gastric pathology.  Patient is pending CT scan prior to disposition here.  Has otherwise been stable while here with no active bleeding. Narendra Stout MD. received so from Dr. Stout, ct and disposition pending SHELDON Pang DO pt with guiaic positive emesis, brown liquid, added protonix, zofran for nausea, additional fluids for lactate of 3.1, not septic, most likely dry secondary to vomiting and no po intake, spoke with daughter at bedside and updated. spoke with Dr. Smith will admit. repeat h/h and lactate at 6am B Bird PERKINS

## 2022-08-31 NOTE — ED PROVIDER NOTE - OBJECTIVE STATEMENT
[id number]  89 yo M,  wPx incl. MOLST + DNR/DNI, dementia, alzheimer's, prior volvulus, pacemaker, HTN, CKD, PVD, BPH, DMt2, HLD, h/o falls, [facility]. Per EMS pt has had moderate amount of coffee ground emesis since 6 am. 1-2 months ago was here in [facility] for similar symptoms of vomiting coffee grounds. Pt has been losing weight for a while. Pt called daughter and by the time the daughter got there he had vomited a lot. Per daughter, he has been c/o nausea, abd pain and chest pain. No drinking history, no blood thinners. At baseline pt is really confused lately.  [id number]  89 yo M,  wPx incl. MOLST + DNR/DNI, dementia, alzheimer's, prior volvulus, pacemaker, HTN, CKD, DMt2, HLD, h/o falls, BIBA from [place] c/o vomiting. Per EMS pt has had moderate amount of coffee ground emesis since 6 pm. 1-2 months ago was here in [facility] for similar symptoms of vomiting coffee grounds due to volvulus. Pt called daughter and by the time the daughter got there he had vomited a lot. Per daughter, he has been c/o nausea, abd pain and chest pain from earlier in the day. No drinking history, no blood thinners. At baseline pt has confusion.

## 2022-08-31 NOTE — ED PROVIDER NOTE - CLINICAL SUMMARY MEDICAL DECISION MAKING FREE TEXT BOX
Pt with prior hx of volvulus now with reported coffee ground emesis concern for possible upper GI bleed with epigastric pain this may be in the setting of recurrent volvulus vs worsening of his hiatal hernia. With no hx of EtOH use or varices do not suspect esophagal varices at this time. Will check lab work and CT scan. Pt also told his daughter that he had chest pain earlier today so will check troponin. Pt with prior hx of volvulus now with reported coffee ground emesis concern for possible upper GI bleed with epigastric pain this may be in the setting of recurrent volvulus vs worsening of his hiatal hernia. With no hx of EtOH use or varices do not suspect esophagal varices at this time. Will check lab work and CT scan. Pt also told his daughter that he had chest pain earlier today so will check troponin, though ekg nonischemic.

## 2022-08-31 NOTE — ED PROVIDER NOTE - PHYSICAL EXAMINATION
Constitutional: Awake, Alert, non-toxic. No acute distress. Well appearing, well nourished.   HEAD: Normocephalic, atraumatic.   EYES: PERRL, EOM intact, conjunctiva and sclera are clear bilaterally. No raccoon eyes.   ENT: External ears normal. No rhinorrhea, no tracheal deviation   NECK: Supple, non-tender  CARDIOVASCULAR: regular rate and rhythm.  RESPIRATORY: Normal respiratory effort; breath sounds CTAB, no wheezes, rhonchi, or rales. Speaking in full sentences. No accessory muscle use.   ABDOMEN: Soft, epigastric tenderness, non-distended. No rebound or guarding.   EXTREMITIES:  no lower extremity edema, no deformities  SKIN: Warm, dry  NEURO: A&O x2 follows commands and moves upper extremities   PSYCH: Appearance and judgement seem appropriate for gender and age. Constitutional: Awake, Alert, non-toxic. No acute distress. Well appearing, well nourished.   HEAD: Normocephalic, atraumatic.   EYES: PERRL, EOM intact, conjunctiva and sclera are clear bilaterally. No raccoon eyes.   ENT: External ears normal. No rhinorrhea, no tracheal deviation   NECK: Supple, non-tender  CARDIOVASCULAR: regular rate and rhythm.  RESPIRATORY: Normal respiratory effort; breath sounds CTAB, no wheezes, rhonchi, or rales. Speaking in full sentences. No accessory muscle use.   ABDOMEN: Soft, epigastric tenderness, non-distended. No rebound or guarding.   EXTREMITIES:  no lower extremity edema, no deformities  SKIN: Warm, dry  NEURO: A&O x2 follows commands and moves upper extremities   PSYCH: confused

## 2022-08-31 NOTE — ED ADULT NURSE NOTE - CHIEF COMPLAINT QUOTE
patient brought in by EMS from [facility] c/o vomiting.  per EMS, patient has had moderate amount of coffee ground emesis since 6 pm.  patient denies abdominal pain upon arrival.  hx dementia, HTN.  DNR/DNI with molst on chart.

## 2022-08-31 NOTE — ED ADULT TRIAGE NOTE - CHIEF COMPLAINT QUOTE
patient brought in by EMS from [facility] c/o vomiting.  per EMS, patient has had moderate amount of coffee ground emesis since 6 pm.  patient denies abdominal pain upon arrival.  hx dementia, HTN.  DNR/DNI with molst on chart.

## 2022-08-31 NOTE — ED ADULT NURSE NOTE - NSIMPLEMENTINTERV_GEN_ALL_ED
Implemented All Fall with Harm Risk Interventions:  [place] to call system. Call bell, personal items and telephone within reach. Instruct patient to call for assistance. Room bathroom lighting operational. Non-slip footwear when patient is off stretcher. Physically safe environment: no spills, clutter or unnecessary equipment. Stretcher in lowest position, wheels locked, appropriate side rails in place. Provide visual cue, wrist band, yellow gown, etc. Monitor gait and stability. Monitor for mental status changes and reorient to person, place, and time. Review medications for side effects contributing to fall risk. Reinforce activity limits and safety measures with patient and family. Provide visual clues: red socks.

## 2022-08-31 NOTE — ED ADULT TRIAGE NOTE - WEIGHT IN KG
[facility]  Electrodiagnostic Laboratory    Nerve Conduction & EMG Report          Patient:       Hannah Carl  Patient ID:    [id number]  Gender:        Female  YOB: 1981  Age:           40 Years 7 Months        History & Examination:  40 year old woman with achy pain in her lower > upper limbs. Symptoms present for about a year. Evaluate for myopathy vs radiculopathy.     Techniques: Motor and sensory conduction studies were done with surface recording electrodes. EMG was done with a concentric needle electrode.      Results:  Nerve conduction studies:  1. Left median-D2, ulnar-D5, radial, sural, and superficial peroneal sensory responses are normal.   2. Left median-APB, ulnar-ADM, peroneal-EDB, and tibial-AH motor responses are normal.     Needle EMG of selected proximal and distal left lower and upper limb muscles was performed as tabulated below. No abnormal spontaneous activity was observed in the sampled muscles. Motor unit potential morphology and recruitment patterns were normal.     Interpretation:  This is a normal study. There is no electrophysiologic evidence of a myopathic or neuropathic process affecting the left upper or lower limb on the basis of this study.     Bijan Truong MD  Department of Neurology        Sensory NCS      Nerve / Sites Rec. Site Onset Peak NP Amp Ref. PP Amp Dist Nash Ref. Temp     ms ms  V  V  V cm m/s m/s  C   L MEDIAN - Dig II Anti      Wrist Dig II 2.14 3.02 34.1 10.0 73.2 14 65.6 48.0 29.3   L ULNAR - Dig V Anti      Wrist Dig V 2.29 3.18 26.1 8.0 60.1 12.5 54.5 48.0 29.2   L RADIAL - Snuff      Forearm Snuff 1.93 2.55 41.4 15.0 65.9 12.5 64.9 48.0 28.8   L SURAL - Lat Mall      Calf Ankle 2.50 3.07 12.8 8.0 12.7 14 56.0 38.0 31   L SUP PERONEAL      Lat Leg Bazzi 2.03 2.50 20.9  21.0 10 49.2 38.0 31       Motor NCS      Nerve / Sites Rec. Site Lat Ref. Amp Ref. Rel Amp Dist Nash Ref. Dur. Area Temp.     ms ms mV mV % cm m/s m/s ms %  C    L MEDIAN - APB      Wrist APB 2.76 4.40 12.9 5.0 100 8   7.97 100 28.9      Elbow APB 6.30  12.9  99.8 21 59.3 48.0 8.18 94.1 29   L ULNAR - ADM      Wrist ADM 2.50 3.50 10.8 5.0 100 8   8.65 100 28.5      B.Elbow ADM 5.63  10.7  99 19 60.8 48.0 8.39 95.5 28.5      A.Elbow ADM 6.98  9.8  91.5 8 59.1 48.0 8.39 91.3 28.4   L DEEP PERONEAL - EDB      Ankle EDB 2.97 6.00 8.3 2.5 100 8   7.03 100 30.7      FibHead EDB 9.64  7.8  94.1 34 51.0 38.0 7.45 98.3 30.7      Pop Fos EDB 11.04  6.9  83.8 8 56.9 38.0 7.40 87.3 30.7   L TIBIAL - AH      Ankle AH 2.45 6.00 12.8 4.0 100 8   5.52 100 30.8      (tcncl) Pop Fos AH 9.43  0.8  6.62 38 54.4 38.0 7.97 12.8 30.8       EMG Summary Table     Spontaneous MUAP Recruitment    IA Fib/PSW Fasc H.F. Amp Dur. PPP Pattern   L. VAST LATERALIS N None None None N N N Normal   L. TIB ANTERIOR N None None None N N N Normal   L. GASTROCN (MED) N None None None N N N Normal   L. GLUTEUS MED N None None None N N N Normal   L. LUMB PSP (L) N None None None       L. DELTOID N None None None N N N Normal   L. TRICEPS N None None None N N N Normal   L. FIRST D INTEROSS N None None None N N N Normal                             63.5

## 2022-09-01 DIAGNOSIS — K92.2 GASTROINTESTINAL HEMORRHAGE, UNSPECIFIED: ICD-10-CM

## 2022-09-01 LAB
ALBUMIN SERPL ELPH-MCNC: 3.2 G/DL — LOW (ref 3.3–5)
ALP SERPL-CCNC: 73 U/L — SIGNIFICANT CHANGE UP (ref 40–120)
ALT FLD-CCNC: 16 U/L — SIGNIFICANT CHANGE UP (ref 12–78)
ANION GAP SERPL CALC-SCNC: 12 MMOL/L — SIGNIFICANT CHANGE UP (ref 5–17)
ANION GAP SERPL CALC-SCNC: 6 MMOL/L — SIGNIFICANT CHANGE UP (ref 5–17)
AST SERPL-CCNC: 15 U/L — SIGNIFICANT CHANGE UP (ref 15–37)
BASOPHILS # BLD AUTO: 0.01 K/UL — SIGNIFICANT CHANGE UP (ref 0–0.2)
BASOPHILS # BLD AUTO: 0.01 K/UL — SIGNIFICANT CHANGE UP (ref 0–0.2)
BASOPHILS NFR BLD AUTO: 0.1 % — SIGNIFICANT CHANGE UP (ref 0–2)
BASOPHILS NFR BLD AUTO: 0.1 % — SIGNIFICANT CHANGE UP (ref 0–2)
BILIRUB SERPL-MCNC: 0.5 MG/DL — SIGNIFICANT CHANGE UP (ref 0.2–1.2)
BUN SERPL-MCNC: 20 MG/DL — SIGNIFICANT CHANGE UP (ref 7–23)
BUN SERPL-MCNC: 20 MG/DL — SIGNIFICANT CHANGE UP (ref 7–23)
CALCIUM SERPL-MCNC: 9.4 MG/DL — SIGNIFICANT CHANGE UP (ref 8.5–10.1)
CALCIUM SERPL-MCNC: 9.5 MG/DL — SIGNIFICANT CHANGE UP (ref 8.5–10.1)
CHLORIDE SERPL-SCNC: 106 MMOL/L — SIGNIFICANT CHANGE UP (ref 96–108)
CHLORIDE SERPL-SCNC: 107 MMOL/L — SIGNIFICANT CHANGE UP (ref 96–108)
CO2 SERPL-SCNC: 22 MMOL/L — SIGNIFICANT CHANGE UP (ref 22–31)
CO2 SERPL-SCNC: 27 MMOL/L — SIGNIFICANT CHANGE UP (ref 22–31)
CREAT SERPL-MCNC: 1.42 MG/DL — HIGH (ref 0.5–1.3)
CREAT SERPL-MCNC: 1.63 MG/DL — HIGH (ref 0.5–1.3)
EGFR: 39 ML/MIN/1.73M2 — LOW
EGFR: 46 ML/MIN/1.73M2 — LOW
EOSINOPHIL # BLD AUTO: 0 K/UL — SIGNIFICANT CHANGE UP (ref 0–0.5)
EOSINOPHIL # BLD AUTO: 0 K/UL — SIGNIFICANT CHANGE UP (ref 0–0.5)
EOSINOPHIL NFR BLD AUTO: 0 % — SIGNIFICANT CHANGE UP (ref 0–6)
EOSINOPHIL NFR BLD AUTO: 0 % — SIGNIFICANT CHANGE UP (ref 0–6)
GLUCOSE SERPL-MCNC: 169 MG/DL — HIGH (ref 70–99)
GLUCOSE SERPL-MCNC: 185 MG/DL — HIGH (ref 70–99)
HCT VFR BLD CALC: 37.6 % — LOW (ref 39–50)
HCT VFR BLD CALC: 39.2 % — SIGNIFICANT CHANGE UP (ref 39–50)
HGB BLD-MCNC: 12.4 G/DL — LOW (ref 13–17)
HGB BLD-MCNC: 13.2 G/DL — SIGNIFICANT CHANGE UP (ref 13–17)
IMM GRANULOCYTES NFR BLD AUTO: 0.5 % — SIGNIFICANT CHANGE UP (ref 0–1.5)
IMM GRANULOCYTES NFR BLD AUTO: 0.5 % — SIGNIFICANT CHANGE UP (ref 0–1.5)
LACTATE SERPL-SCNC: 10.3 MMOL/L — CRITICAL HIGH (ref 0.7–2)
LACTATE SERPL-SCNC: 10.9 MMOL/L — CRITICAL HIGH (ref 0.7–2)
LACTATE SERPL-SCNC: 4 MMOL/L — CRITICAL HIGH (ref 0.7–2)
LACTATE SERPL-SCNC: 7.4 MMOL/L — CRITICAL HIGH (ref 0.7–2)
LYMPHOCYTES # BLD AUTO: 0.19 K/UL — LOW (ref 1–3.3)
LYMPHOCYTES # BLD AUTO: 0.46 K/UL — LOW (ref 1–3.3)
LYMPHOCYTES # BLD AUTO: 1.7 % — LOW (ref 13–44)
LYMPHOCYTES # BLD AUTO: 4.4 % — LOW (ref 13–44)
MCHC RBC-ENTMCNC: 32.8 PG — SIGNIFICANT CHANGE UP (ref 27–34)
MCHC RBC-ENTMCNC: 33 GM/DL — SIGNIFICANT CHANGE UP (ref 32–36)
MCHC RBC-ENTMCNC: 33 PG — SIGNIFICANT CHANGE UP (ref 27–34)
MCHC RBC-ENTMCNC: 33.7 GM/DL — SIGNIFICANT CHANGE UP (ref 32–36)
MCV RBC AUTO: 98 FL — SIGNIFICANT CHANGE UP (ref 80–100)
MCV RBC AUTO: 99.5 FL — SIGNIFICANT CHANGE UP (ref 80–100)
MONOCYTES # BLD AUTO: 0.52 K/UL — SIGNIFICANT CHANGE UP (ref 0–0.9)
MONOCYTES # BLD AUTO: 0.58 K/UL — SIGNIFICANT CHANGE UP (ref 0–0.9)
MONOCYTES NFR BLD AUTO: 5 % — SIGNIFICANT CHANGE UP (ref 2–14)
MONOCYTES NFR BLD AUTO: 5.3 % — SIGNIFICANT CHANGE UP (ref 2–14)
NEUTROPHILS # BLD AUTO: 10.14 K/UL — HIGH (ref 1.8–7.4)
NEUTROPHILS # BLD AUTO: 9.4 K/UL — HIGH (ref 1.8–7.4)
NEUTROPHILS NFR BLD AUTO: 90 % — HIGH (ref 43–77)
NEUTROPHILS NFR BLD AUTO: 92.4 % — HIGH (ref 43–77)
PLATELET # BLD AUTO: 164 K/UL — SIGNIFICANT CHANGE UP (ref 150–400)
PLATELET # BLD AUTO: 178 K/UL — SIGNIFICANT CHANGE UP (ref 150–400)
POTASSIUM SERPL-MCNC: 3.5 MMOL/L — SIGNIFICANT CHANGE UP (ref 3.5–5.3)
POTASSIUM SERPL-MCNC: 3.8 MMOL/L — SIGNIFICANT CHANGE UP (ref 3.5–5.3)
POTASSIUM SERPL-SCNC: 3.5 MMOL/L — SIGNIFICANT CHANGE UP (ref 3.5–5.3)
POTASSIUM SERPL-SCNC: 3.8 MMOL/L — SIGNIFICANT CHANGE UP (ref 3.5–5.3)
PROT SERPL-MCNC: 6.3 GM/DL — SIGNIFICANT CHANGE UP (ref 6–8.3)
RBC # BLD: 3.78 M/UL — LOW (ref 4.2–5.8)
RBC # BLD: 4 M/UL — LOW (ref 4.2–5.8)
RBC # FLD: 13.8 % — SIGNIFICANT CHANGE UP (ref 10.3–14.5)
RBC # FLD: 13.9 % — SIGNIFICANT CHANGE UP (ref 10.3–14.5)
SODIUM SERPL-SCNC: 140 MMOL/L — SIGNIFICANT CHANGE UP (ref 135–145)
SODIUM SERPL-SCNC: 140 MMOL/L — SIGNIFICANT CHANGE UP (ref 135–145)
WBC # BLD: 10.44 K/UL — SIGNIFICANT CHANGE UP (ref 3.8–10.5)
WBC # BLD: 10.98 K/UL — HIGH (ref 3.8–10.5)
WBC # FLD AUTO: 10.44 K/UL — SIGNIFICANT CHANGE UP (ref 3.8–10.5)
WBC # FLD AUTO: 10.98 K/UL — HIGH (ref 3.8–10.5)

## 2022-09-01 PROCEDURE — 82962 GLUCOSE BLOOD TEST: CPT

## 2022-09-01 PROCEDURE — 85025 COMPLETE CBC W/AUTO DIFF WBC: CPT

## 2022-09-01 PROCEDURE — 71045 X-RAY EXAM CHEST 1 VIEW: CPT | Mod: 26

## 2022-09-01 PROCEDURE — 80053 COMPREHEN METABOLIC PANEL: CPT

## 2022-09-01 PROCEDURE — 99291 CRITICAL CARE FIRST HOUR: CPT

## 2022-09-01 PROCEDURE — 36415 COLL VENOUS BLD VENIPUNCTURE: CPT

## 2022-09-01 PROCEDURE — 87040 BLOOD CULTURE FOR BACTERIA: CPT

## 2022-09-01 PROCEDURE — C9113: CPT

## 2022-09-01 PROCEDURE — 74018 RADEX ABDOMEN 1 VIEW: CPT

## 2022-09-01 PROCEDURE — 83605 ASSAY OF LACTIC ACID: CPT

## 2022-09-01 PROCEDURE — 99223 1ST HOSP IP/OBS HIGH 75: CPT | Mod: 25

## 2022-09-01 PROCEDURE — 74018 RADEX ABDOMEN 1 VIEW: CPT | Mod: 26

## 2022-09-01 PROCEDURE — 71045 X-RAY EXAM CHEST 1 VIEW: CPT

## 2022-09-01 PROCEDURE — 80048 BASIC METABOLIC PNL TOTAL CA: CPT

## 2022-09-01 RX ORDER — SODIUM CHLORIDE 9 MG/ML
1850 INJECTION, SOLUTION INTRAVENOUS ONCE
Refills: 0 | Status: COMPLETED | OUTPATIENT
Start: 2022-09-01 | End: 2022-09-01

## 2022-09-01 RX ORDER — CEFEPIME 1 G/1
2000 INJECTION, POWDER, FOR SOLUTION INTRAMUSCULAR; INTRAVENOUS ONCE
Refills: 0 | Status: COMPLETED | OUTPATIENT
Start: 2022-09-01 | End: 2022-09-01

## 2022-09-01 RX ORDER — METRONIDAZOLE 500 MG
500 TABLET ORAL ONCE
Refills: 0 | Status: COMPLETED | OUTPATIENT
Start: 2022-09-01 | End: 2022-09-01

## 2022-09-01 RX ORDER — CEFEPIME 1 G/1
2000 INJECTION, POWDER, FOR SOLUTION INTRAMUSCULAR; INTRAVENOUS EVERY 8 HOURS
Refills: 0 | Status: DISCONTINUED | OUTPATIENT
Start: 2022-09-01 | End: 2022-09-01

## 2022-09-01 RX ORDER — ONDANSETRON 8 MG/1
4 TABLET, FILM COATED ORAL ONCE
Refills: 0 | Status: COMPLETED | OUTPATIENT
Start: 2022-09-01 | End: 2022-09-01

## 2022-09-01 RX ORDER — MORPHINE SULFATE 50 MG/1
2 CAPSULE, EXTENDED RELEASE ORAL EVERY 4 HOURS
Refills: 0 | Status: DISCONTINUED | OUTPATIENT
Start: 2022-09-01 | End: 2022-09-04

## 2022-09-01 RX ORDER — MAGNESIUM SULFATE 500 MG/ML
1 VIAL (ML) INJECTION ONCE
Refills: 0 | Status: COMPLETED | OUTPATIENT
Start: 2022-09-01 | End: 2022-09-01

## 2022-09-01 RX ORDER — METOPROLOL TARTRATE 50 MG
5 TABLET ORAL ONCE
Refills: 0 | Status: COMPLETED | OUTPATIENT
Start: 2022-09-01 | End: 2022-09-01

## 2022-09-01 RX ORDER — ACETAMINOPHEN 500 MG
650 TABLET ORAL EVERY 6 HOURS
Refills: 0 | Status: DISCONTINUED | OUTPATIENT
Start: 2022-09-01 | End: 2022-09-04

## 2022-09-01 RX ORDER — QUETIAPINE FUMARATE 200 MG/1
25 TABLET, FILM COATED ORAL DAILY
Refills: 0 | Status: DISCONTINUED | OUTPATIENT
Start: 2022-09-01 | End: 2022-09-01

## 2022-09-01 RX ORDER — METRONIDAZOLE 500 MG
500 TABLET ORAL EVERY 8 HOURS
Refills: 0 | Status: DISCONTINUED | OUTPATIENT
Start: 2022-09-01 | End: 2022-09-01

## 2022-09-01 RX ORDER — HYDRALAZINE HCL 50 MG
10 TABLET ORAL ONCE
Refills: 0 | Status: COMPLETED | OUTPATIENT
Start: 2022-09-01 | End: 2022-09-01

## 2022-09-01 RX ORDER — CEFEPIME 1 G/1
INJECTION, POWDER, FOR SOLUTION INTRAMUSCULAR; INTRAVENOUS
Refills: 0 | Status: DISCONTINUED | OUTPATIENT
Start: 2022-09-01 | End: 2022-09-01

## 2022-09-01 RX ORDER — METRONIDAZOLE 500 MG
TABLET ORAL
Refills: 0 | Status: DISCONTINUED | OUTPATIENT
Start: 2022-09-01 | End: 2022-09-01

## 2022-09-01 RX ORDER — TIMOLOL 0.5 %
1 DROPS OPHTHALMIC (EYE)
Refills: 0 | Status: DISCONTINUED | OUTPATIENT
Start: 2022-09-01 | End: 2022-09-01

## 2022-09-01 RX ORDER — SODIUM CHLORIDE 9 MG/ML
1950 INJECTION, SOLUTION INTRAVENOUS ONCE
Refills: 0 | Status: COMPLETED | OUTPATIENT
Start: 2022-09-01 | End: 2022-09-01

## 2022-09-01 RX ORDER — PANTOPRAZOLE SODIUM 20 MG/1
8 TABLET, DELAYED RELEASE ORAL
Qty: 80 | Refills: 0 | Status: DISCONTINUED | OUTPATIENT
Start: 2022-09-01 | End: 2022-09-04

## 2022-09-01 RX ORDER — LANOLIN ALCOHOL/MO/W.PET/CERES
3 CREAM (GRAM) TOPICAL AT BEDTIME
Refills: 0 | Status: DISCONTINUED | OUTPATIENT
Start: 2022-09-01 | End: 2022-09-01

## 2022-09-01 RX ORDER — HYDRALAZINE HCL 50 MG
10 TABLET ORAL EVERY 8 HOURS
Refills: 0 | Status: DISCONTINUED | OUTPATIENT
Start: 2022-09-01 | End: 2022-09-04

## 2022-09-01 RX ORDER — SODIUM CHLORIDE 9 MG/ML
1000 INJECTION INTRAMUSCULAR; INTRAVENOUS; SUBCUTANEOUS
Refills: 0 | Status: DISCONTINUED | OUTPATIENT
Start: 2022-09-01 | End: 2022-09-01

## 2022-09-01 RX ORDER — PANTOPRAZOLE SODIUM 20 MG/1
80 TABLET, DELAYED RELEASE ORAL ONCE
Refills: 0 | Status: COMPLETED | OUTPATIENT
Start: 2022-09-01 | End: 2022-09-01

## 2022-09-01 RX ORDER — HALOPERIDOL DECANOATE 100 MG/ML
2 INJECTION INTRAMUSCULAR EVERY 6 HOURS
Refills: 0 | Status: DISCONTINUED | OUTPATIENT
Start: 2022-09-01 | End: 2022-09-04

## 2022-09-01 RX ORDER — SODIUM CHLORIDE 9 MG/ML
1000 INJECTION INTRAMUSCULAR; INTRAVENOUS; SUBCUTANEOUS ONCE
Refills: 0 | Status: COMPLETED | OUTPATIENT
Start: 2022-09-01 | End: 2022-09-01

## 2022-09-01 RX ORDER — ACETAMINOPHEN 500 MG
1000 TABLET ORAL ONCE
Refills: 0 | Status: COMPLETED | OUTPATIENT
Start: 2022-09-01 | End: 2022-09-01

## 2022-09-01 RX ORDER — TAMSULOSIN HYDROCHLORIDE 0.4 MG/1
0.4 CAPSULE ORAL AT BEDTIME
Refills: 0 | Status: DISCONTINUED | OUTPATIENT
Start: 2022-09-01 | End: 2022-09-01

## 2022-09-01 RX ORDER — ONDANSETRON 8 MG/1
4 TABLET, FILM COATED ORAL EVERY 8 HOURS
Refills: 0 | Status: DISCONTINUED | OUTPATIENT
Start: 2022-09-01 | End: 2022-09-04

## 2022-09-01 RX ADMIN — ONDANSETRON 4 MILLIGRAM(S): 8 TABLET, FILM COATED ORAL at 02:27

## 2022-09-01 RX ADMIN — MORPHINE SULFATE 4 MILLIGRAM(S): 50 CAPSULE, EXTENDED RELEASE ORAL at 00:53

## 2022-09-01 RX ADMIN — ONDANSETRON 4 MILLIGRAM(S): 8 TABLET, FILM COATED ORAL at 00:39

## 2022-09-01 RX ADMIN — MORPHINE SULFATE 2 MILLIGRAM(S): 50 CAPSULE, EXTENDED RELEASE ORAL at 18:57

## 2022-09-01 RX ADMIN — Medication 1 MILLIGRAM(S): at 10:39

## 2022-09-01 RX ADMIN — Medication 100 MILLIGRAM(S): at 14:25

## 2022-09-01 RX ADMIN — SODIUM CHLORIDE 1950 MILLILITER(S): 9 INJECTION, SOLUTION INTRAVENOUS at 12:06

## 2022-09-01 RX ADMIN — Medication 10 MILLIGRAM(S): at 11:50

## 2022-09-01 RX ADMIN — MORPHINE SULFATE 2 MILLIGRAM(S): 50 CAPSULE, EXTENDED RELEASE ORAL at 23:14

## 2022-09-01 RX ADMIN — SODIUM CHLORIDE 1850 MILLILITER(S): 9 INJECTION, SOLUTION INTRAVENOUS at 09:56

## 2022-09-01 RX ADMIN — PANTOPRAZOLE SODIUM 10 MG/HR: 20 TABLET, DELAYED RELEASE ORAL at 02:58

## 2022-09-01 RX ADMIN — Medication 400 MILLIGRAM(S): at 07:45

## 2022-09-01 RX ADMIN — SODIUM CHLORIDE 100 MILLILITER(S): 9 INJECTION INTRAMUSCULAR; INTRAVENOUS; SUBCUTANEOUS at 07:45

## 2022-09-01 RX ADMIN — Medication 10 MILLIGRAM(S): at 06:36

## 2022-09-01 RX ADMIN — PANTOPRAZOLE SODIUM 10 MG/HR: 20 TABLET, DELAYED RELEASE ORAL at 08:12

## 2022-09-01 RX ADMIN — SODIUM CHLORIDE 1000 MILLILITER(S): 9 INJECTION INTRAMUSCULAR; INTRAVENOUS; SUBCUTANEOUS at 02:26

## 2022-09-01 RX ADMIN — PANTOPRAZOLE SODIUM 10 MG/HR: 20 TABLET, DELAYED RELEASE ORAL at 11:10

## 2022-09-01 RX ADMIN — Medication 100 GRAM(S): at 06:35

## 2022-09-01 RX ADMIN — PANTOPRAZOLE SODIUM 80 MILLIGRAM(S): 20 TABLET, DELAYED RELEASE ORAL at 02:49

## 2022-09-01 RX ADMIN — CEFEPIME 100 MILLIGRAM(S): 1 INJECTION, POWDER, FOR SOLUTION INTRAMUSCULAR; INTRAVENOUS at 15:29

## 2022-09-01 RX ADMIN — SODIUM CHLORIDE 1000 MILLILITER(S): 9 INJECTION INTRAMUSCULAR; INTRAVENOUS; SUBCUTANEOUS at 00:00

## 2022-09-01 RX ADMIN — Medication 5 MILLIGRAM(S): at 03:41

## 2022-09-01 RX ADMIN — MORPHINE SULFATE 2 MILLIGRAM(S): 50 CAPSULE, EXTENDED RELEASE ORAL at 22:59

## 2022-09-01 NOTE — CONSULT NOTE ADULT - SUBJECTIVE AND OBJECTIVE BOX
Patient is a 89y old  Male who presents with a chief complaint of Upper GI bleed  Coffee ground emesis (01 Sep 2022 10:45)    HPI:  This is a 89 year old male w Alzheimer dementia, BPH, CKD, DM, HLD, HTN, hx falls, prior GI bleed due to volvulus  came to ED by EMS for abdominal pain and coffee ground emesis. Had episodes CGE with EMS. Patient poor historian. With history constipation. No reports of melena, hematochezia. Does not take any blood thinning agents. Evaluated recently by our service here at  for CGE r/t gastric volvulus. Patient has a very large hiatal hernia.   CT without evidence of inflammation or obstruction.     PAST MEDICAL & SURGICAL HISTORY:  HTN (hypertension)      HLD (hyperlipidemia)      Diabetes      History of BPH      OLIVER (dementia of Alzheimer type)      Bradycardia      Glaucoma      S/P hernia repair      MEDICATIONS  (STANDING):  artificial  tears Solution 1 Drop(s) Both EYES two times a day  cefepime   IVPB      cefepime   IVPB 2000 milliGRAM(s) IV Intermittent once  cefepime   IVPB 2000 milliGRAM(s) IV Intermittent every 8 hours  lactated ringers Bolus 1950 milliLiter(s) IV Bolus once  metroNIDAZOLE  IVPB      metroNIDAZOLE  IVPB 500 milliGRAM(s) IV Intermittent once  metroNIDAZOLE  IVPB 500 milliGRAM(s) IV Intermittent every 8 hours  pantoprazole Infusion 8 mG/Hr (10 mL/Hr) IV Continuous <Continuous>  QUEtiapine 25 milliGRAM(s) Oral daily  tamsulosin 0.4 milliGRAM(s) Oral at bedtime  timolol 0.5% Solution 1 Drop(s) Both EYES two times a day    MEDICATIONS  (PRN):  acetaminophen     Tablet .. 650 milliGRAM(s) Oral every 6 hours PRN Temp greater or equal to 38C (100.4F), Mild Pain (1 - 3)  aluminum hydroxide/magnesium hydroxide/simethicone Suspension 30 milliLiter(s) Oral every 4 hours PRN Dyspepsia  haloperidol    Injectable 2 milliGRAM(s) IntraMuscular every 6 hours PRN agitation  hydrALAZINE Injectable 10 milliGRAM(s) IV Push every 8 hours PRN BP sys > 160  LORazepam   Injectable 1 milliGRAM(s) IV Push every 4 hours PRN Agitation  melatonin 3 milliGRAM(s) Oral at bedtime PRN Insomnia  ondansetron Injectable 4 milliGRAM(s) IV Push every 8 hours PRN Nausea and/or Vomiting    Allergies    penicillins (Other)  tetracyclines (Other)    Intolerances    SOCIAL HISTORY:    FAMILY HISTORY:  Known health problems: none        REVIEW OF SYSTEMS:  Unable to obtain due to dementia    Vital Signs Last 24 Hrs  T(C): 36.3 (01 Sep 2022 12:22), Max: 36.6 (31 Aug 2022 21:18)  T(F): 97.3 (01 Sep 2022 12:22), Max: 97.9 (31 Aug 2022 21:18)  HR: 94 (01 Sep 2022 12:22) (60 - 94)  BP: 142/70 (01 Sep 2022 12:22) (142/70 - 217/99)  BP(mean): 106 (01 Sep 2022 07:07) (103 - 133)  RR: 18 (01 Sep 2022 12:22) (12 - 18)  SpO2: 99% (01 Sep 2022 12:22) (94% - 100%)    Parameters below as of 01 Sep 2022 12:22  Patient On (Oxygen Delivery Method): nasal cannula  O2 Flow (L/min): 2      PHYSICAL EXAM:    Constitutional: No acute distress, frail non-toxic appearing  HEENT: masked, good phonation, not icteric  Neck: supple, no lymphadenopathy  Respiratory: clear to ascultation bilaterally, no wheezing  Cardiovascular: S1 and S2, regular rate and rhythm, no murmurs rubs or gallops  Gastrointestinal: soft, non-tender, non-distended, +bowel sounds, no rebound or guarding, no surgical scars, no drains  Extremities: No peripheral edema, no cyanosis or clubbing  Vascular: 2+ peripheral pulses, no venous stasis  Neurological: A/O x 1, no focal deficits, no asterixis  Psychiatric: Normal mood, normal affect  Skin: No rashes, not jaundiced    LABS:                        12.4   10.98 )-----------( 164      ( 01 Sep 2022 11:34 )             37.6     09-01    140  |  106  |  20  ----------------------------<  169<H>  3.5   |  22  |  1.63<H>    Ca    9.4      01 Sep 2022 11:34  Mg     2.3     08-31    TPro  6.3  /  Alb  3.2<L>  /  TBili  0.5  /  DBili  x   /  AST  15  /  ALT  16  /  AlkPhos  73  09-01    PT/INR - ( 31 Aug 2022 21:29 )   PT: 12.3 sec;   INR: 1.06 ratio         PTT - ( 31 Aug 2022 21:29 )  PTT:31.3 sec  LIVER FUNCTIONS - ( 01 Sep 2022 11:34 )  Alb: 3.2 g/dL / Pro: 6.3 gm/dL / ALK PHOS: 73 U/L / ALT: 16 U/L / AST: 15 U/L / GGT: x             RADIOLOGY & ADDITIONAL STUDIES: Patient is a 89y old  Male who presents with a chief complaint of Upper GI bleed  Coffee ground emesis (01 Sep 2022 10:45)    HPI:  This is a 89 year old male w Alzheimer dementia, BPH, CKD, DM, HLD, HTN, hx falls, prior GI bleed due to volvulus  came to ED by EMS for abdominal pain and coffee ground emesis. Had episodes CGE with EMS. Patient poor historian. With history constipation. No reports of melena, hematochezia. Does not take any blood thinning agents. Evaluated recently by our service here at  for CGE r/t gastric volvulus. Patient has a very large hiatal hernia.   CT without evidence of inflammation or obstruction.     PAST MEDICAL & SURGICAL HISTORY:  HTN (hypertension)      HLD (hyperlipidemia)      Diabetes      History of BPH      OLIVER (dementia of Alzheimer type)      Bradycardia      Glaucoma      S/P hernia repair      MEDICATIONS  (STANDING):  artificial  tears Solution 1 Drop(s) Both EYES two times a day  cefepime   IVPB      cefepime   IVPB 2000 milliGRAM(s) IV Intermittent once  cefepime   IVPB 2000 milliGRAM(s) IV Intermittent every 8 hours  lactated ringers Bolus 1950 milliLiter(s) IV Bolus once  metroNIDAZOLE  IVPB      metroNIDAZOLE  IVPB 500 milliGRAM(s) IV Intermittent once  metroNIDAZOLE  IVPB 500 milliGRAM(s) IV Intermittent every 8 hours  pantoprazole Infusion 8 mG/Hr (10 mL/Hr) IV Continuous <Continuous>  QUEtiapine 25 milliGRAM(s) Oral daily  tamsulosin 0.4 milliGRAM(s) Oral at bedtime  timolol 0.5% Solution 1 Drop(s) Both EYES two times a day    MEDICATIONS  (PRN):  acetaminophen     Tablet .. 650 milliGRAM(s) Oral every 6 hours PRN Temp greater or equal to 38C (100.4F), Mild Pain (1 - 3)  aluminum hydroxide/magnesium hydroxide/simethicone Suspension 30 milliLiter(s) Oral every 4 hours PRN Dyspepsia  haloperidol    Injectable 2 milliGRAM(s) IntraMuscular every 6 hours PRN agitation  hydrALAZINE Injectable 10 milliGRAM(s) IV Push every 8 hours PRN BP sys > 160  LORazepam   Injectable 1 milliGRAM(s) IV Push every 4 hours PRN Agitation  melatonin 3 milliGRAM(s) Oral at bedtime PRN Insomnia  ondansetron Injectable 4 milliGRAM(s) IV Push every 8 hours PRN Nausea and/or Vomiting    Allergies    penicillins (Other)  tetracyclines (Other)    Intolerances    SOCIAL HISTORY: no smoking, no drugs, no drinking    FAMILY HISTORY:  Known health problems: none        REVIEW OF SYSTEMS:  Unable to obtain due to dementia    Vital Signs Last 24 Hrs  T(C): 36.3 (01 Sep 2022 12:22), Max: 36.6 (31 Aug 2022 21:18)  T(F): 97.3 (01 Sep 2022 12:22), Max: 97.9 (31 Aug 2022 21:18)  HR: 94 (01 Sep 2022 12:22) (60 - 94)  BP: 142/70 (01 Sep 2022 12:22) (142/70 - 217/99)  BP(mean): 106 (01 Sep 2022 07:07) (103 - 133)  RR: 18 (01 Sep 2022 12:22) (12 - 18)  SpO2: 99% (01 Sep 2022 12:22) (94% - 100%)    Parameters below as of 01 Sep 2022 12:22  Patient On (Oxygen Delivery Method): nasal cannula  O2 Flow (L/min): 2      PHYSICAL EXAM:    Constitutional: No acute distress, frail non-toxic appearing  HEENT: masked, good phonation, not icteric  Neck: supple, no lymphadenopathy  Respiratory: clear to ascultation bilaterally, no wheezing  Cardiovascular: S1 and S2, regular rate and rhythm, no murmurs rubs or gallops  Gastrointestinal: soft, non-tender, non-distended, +bowel sounds, no rebound or guarding,  Extremities: No peripheral edema, no cyanosis or clubbing  Vascular: 2+ peripheral pulses, no venous stasis  Neurological: A/O x 1, no focal deficits, no asterixis  Psychiatric: Normal mood, normal affect  Skin: No rashes, not jaundiced    LABS:                        12.4   10.98 )-----------( 164      ( 01 Sep 2022 11:34 )             37.6     09-01    140  |  106  |  20  ----------------------------<  169<H>  3.5   |  22  |  1.63<H>    Ca    9.4      01 Sep 2022 11:34  Mg     2.3     08-31    TPro  6.3  /  Alb  3.2<L>  /  TBili  0.5  /  DBili  x   /  AST  15  /  ALT  16  /  AlkPhos  73  09-01    PT/INR - ( 31 Aug 2022 21:29 )   PT: 12.3 sec;   INR: 1.06 ratio         PTT - ( 31 Aug 2022 21:29 )  PTT:31.3 sec  LIVER FUNCTIONS - ( 01 Sep 2022 11:34 )  Alb: 3.2 g/dL / Pro: 6.3 gm/dL / ALK PHOS: 73 U/L / ALT: 16 U/L / AST: 15 U/L / GGT: x             RADIOLOGY & ADDITIONAL STUDIES: CT reviewed, without volvulus or incarceration

## 2022-09-01 NOTE — PROVIDER CONTACT NOTE (CRITICAL VALUE NOTIFICATION) - SITUATION
Critical Lab value from lab. Lactate 10.3.
Critical lab value from ICU. Lactate 10.9.
Critical lab value from lab. Lactate 7.4.

## 2022-09-01 NOTE — PROVIDER CONTACT NOTE (CHANGE IN STATUS NOTIFICATION) - SITUATION
Pt. vomiting large amount of coffee ground emesis. Pt aspirating. Family at bedside. Rapid response team called. Dr. Taylor called.

## 2022-09-01 NOTE — ED ADULT NURSE REASSESSMENT NOTE - NS ED NURSE REASSESS COMMENT FT1
Spoke with hospitalist to see when they would be down to evaluate patient. Explained that patient's blood pressure is still evaluated despite medication administration, the patient is still experiencing nausea and pain. Will medicate patient as per orders.

## 2022-09-01 NOTE — CONSULT NOTE ADULT - SUBJECTIVE AND OBJECTIVE BOX
Patient is a 89y old  Male who presents with a chief complaint of Upper GI bleed Coffee ground emesis (01 Sep 2022 06:18)    BRIEF HOSPITAL COURSE-  89 year old male with PMH of Alzheimer dementia, BPH, CKD, DM, HLD, HTN, hx Falls, Prior GI bleed due to Volvulus (6/22) BIBA for abdominal pain and coffee ground emesis. Per patients daughter, he has been complaining of nausea, abdominal pain and chest pain since yesterday morning. Admits he has not himself for the past week.     On presentation to ED, patient mildly hypertensive but otherwise hemodynamically stable. Lactate 2.6. CT AP negative for acute pathology, persistent hiatal hernia with debris of ingested content. Continues to complain of abdominal pain, per ED staff patient with multiple episodes of vomiting.     Of note, patient was recently sent to the ED for increased confusion, nausea, abdominal pain, r/o volvulus. CT AP negative for acute pathology, positive constipation and sliding hiatal hernia. Patient was transported back to NH.     Events last 24 hours-  Patient admitted to medicine under Dr. Taylor. Started on Protonix gtt and GI consulted. Given NS 1L bolus x2. Repeat Lactate this AM 7.1. ICU consulted for sepsis. Patient seen and examined, non-toxic appearing. However, poor historian and refusing to participate in interview. Patient does endorse relief in abdominal pain. Hemodynamically stable.     Review of Systems:  CONSTITUTIONAL: No fever, chills, or fatigue.  EYES: No eye pain, visual disturbances, or discharge.  ENMT:  No difficulty hearing, tinnitus, vertigo; No sinus or throat pain.  NECK: No pain or stiffness.  RESPIRATORY: No cough, wheezing, chills or hemoptysis; No shortness of breath.  CARDIOVASCULAR: No chest pain, palpitations, dizziness, or leg swelling.  GASTROINTESTINAL: No abdominal or epigastric pain. No nausea, vomiting, or hematemesis; No diarrhea or constipation. No melena or hematochezia..  GENITOURINARY: No dysuria, frequency, hematuria, or incontinence.  NEUROLOGICAL: No headaches, memory loss, loss of strength, numbness, or tremors.  SKIN: No itching, burning, rashes, or lesions.  MUSCULOSKELETAL: No joint pain or swelling; No muscle, back, or extremity pain.  PSYCHIATRIC: No depression, anxiety, mood swings, or difficulty sleeping.    PAST MEDICAL & SURGICAL HISTORY:  HTN (hypertension)    HLD (hyperlipidemia)    Diabetes    History of BPH    OLIVER (dementia of Alzheimer type)    Bradycardia    Glaucoma      S/P hernia repair    Medications:    hydrALAZINE Injectable 10 milliGRAM(s) IV Push every 8 hours PRN  tamsulosin 0.4 milliGRAM(s) Oral at bedtime    acetaminophen     Tablet .. 650 milliGRAM(s) Oral every 6 hours PRN  melatonin 3 milliGRAM(s) Oral at bedtime PRN  ondansetron Injectable 4 milliGRAM(s) IV Push every 8 hours PRN    aluminum hydroxide/magnesium hydroxide/simethicone Suspension 30 milliLiter(s) Oral every 4 hours PRN  pantoprazole Infusion 8 mG/Hr IV Continuous <Continuous>    artificial  tears Solution 1 Drop(s) Both EYES two times a day  timolol 0.5% Solution 1 Drop(s) Both EYES two times a day    ICU Vital Signs Last 24 Hrs  T(C): 35.9 (01 Sep 2022 10:27), Max: 36.6 (31 Aug 2022 21:18)  T(F): 96.6 (01 Sep 2022 10:27), Max: 97.9 (31 Aug 2022 21:18)  HR: 81 (01 Sep 2022 10:27) (60 - 85)  BP: 183/85 (01 Sep 2022 10:27) (157/108 - 217/99)  BP(mean): 106 (01 Sep 2022 07:07) (103 - 133)  ABP: --  ABP(mean): --  RR: 18 (01 Sep 2022 10:27) (12 - 18)  SpO2: 100% (01 Sep 2022 10:27) (94% - 100%)    O2 Parameters below as of 01 Sep 2022 10:27  Patient On (Oxygen Delivery Method): nasal cannula  O2 Flow (L/min): 2    I&O's Detail      LABS:                        13.2   10.44 )-----------( 178      ( 01 Sep 2022 06:33 )             39.2     09-01    140  |  107  |  20  ----------------------------<  185<H>  3.8   |  27  |  1.42<H>    Ca    9.5      01 Sep 2022 06:33  Mg     2.3     08-31    TPro  7.2  /  Alb  3.6  /  TBili  0.5  /  DBili  x   /  AST  13<L>  /  ALT  20  /  AlkPhos  82  08-31    CAPILLARY BLOOD GLUCOSE        PT/INR - ( 31 Aug 2022 21:29 )   PT: 12.3 sec;   INR: 1.06 ratio      PTT - ( 31 Aug 2022 21:29 )  PTT:31.3 sec    CULTURES:      Physical Examination:  General: No acute distress.   HEENT: Pupils equal, reactive to light. Symmetric.  PULM: Clear to auscultation bilaterally. No adventitious sounds. No significant sputum production.  NECK: Supple, no lymphadenopathy, trachea midline.  CVS: Regular rate and rhythm, no murmurs, rubs, or gallops.  ABD: Soft, nondistended, nontender, hypooactive bowel sounds, no masses.  EXT: No edema, nontender.  SKIN: Warm and well perfused, no rashes noted.  NEURO: Alert, oriented, interactive, nonfocal  DEVICES:     RADIOLOGY-   < from: Xray Chest 1 View- PORTABLE-Urgent (08.31.22 @ 22:14) >  ACC: [id number] EXAM:  XR ABDOMEN PORTABLE URGENT 1V                        ACC: [id number] EXAM:  XR CHEST PORTABLE URGENT 1V                          PROCEDURE DATE:  08/31/2022      INTERPRETATION:  Chest and abdomen. Concern is volvulus. Patient has   chest pain.    AP chest on August 31, 2022 at 10:05 PM.    Heart magnified by technique. Quite large hiatal hernia and left-sided   pacemaker again noted.    Slightly increased interstitial markings at left base again noted.    Chest is similar to August 24 this year.    Abdomen.    Material calcifications are noted.    Not much gas is seen in bowel. No sign of bowel obstruction. Right lumbar   curve.    Abdomen further assessed with follow-up CAT scan.    IMPRESSION: As above.    --- End of Report ---      TAMIKO CARTER MD; Attending Radiologist  This document has been electronically signed. Sep  1 2022  9:57AM    < end of copied text >    < from: CT Abdomen and Pelvis w/ IV Cont (08.31.22 @ 23:46) >  ACC: [id number] EXAM:  CT ABDOMEN AND PELVIS IC                          PROCEDURE DATE:  08/31/2022      INTERPRETATION:  CLINICAL INFORMATION: hx gastric volvulus, now with   coffee ground emesis.    PROCEDURE:  Helical axial images were obtained from the domes of the diaphragm   through the pubic symphysis following the administration of intravenous   contrast. Coronal and sagittal reformats were also obtained.    CONTRAST/COMPLICATIONS:  IV Contrast: Omnipaque 350  90 cc administered   10cc discarded  Oral Contrast: NONE  Complications: None reported at time of study completion    COMPARISON: 8/24/2022.    FINDINGS: Streak artifact degrades images.    LOWER CHEST: Atelectasis. Coronary artery and aortic valve calcification.   Trace pericardial fluid. Pacemaker leads. Bilateral gynecomastia.    LIVER: Subcentimeter hypodense foci, too small to characterize.  GALLBLADDER/BILE DUCTS: No intrahepatic or extrahepatic biliary   dilatation. No significant gallbladder edema.  PANCREAS: Stable indeterminate lesion at the uncinate process.  SPLEEN: Unremarkable.    ADRENALS: Stable nodular thickening of the left adrenal gland.  KIDNEYS/URETERS: Mild bilateral perinephric stranding without   hydroureteronephrosis. Again noted, bilateralrenal cysts and   subcentimeter hypodense foci, too small to characterize.  BLADDER: Distended.  REPRODUCTIVE ORGANS: Enlarged prostate indenting the bladder neck. Right   hydrocele.    BOWEL: Again noted, large hiatal hernia with fluid-filled stomach. 1.7 x   0.7 cm focus of high attenuation in the distal stomach lumen. No bowel   obstruction. Borderline dilated appendix. Prominent wall of the   transverse, descending and sigmoid colon without significant inflammatory   change. Colon diverticulosis.  PERITONEUM: No drainable fluid collection or free air.  VESSELS: Atherosclerosis.Unremarkable.Normal caliber of the abdominal   aorta.  RETROPERITONEUM: No lymphadenopathy.Subcentimeter lymph nodes without   lymphadenopathy.  ABDOMINAL WALL/SOFTTISSUES: Unremarkable.  BONES: Osteopenia. Transitional lumbosacral anatomy. S-shaped curvature   and degenerative changes of the spine. Again noted, multiple compression   fractures of the thoracolumbar spine. Again noted, bilateral rib   deformities.    IMPRESSION:    Persistent large hiatal hernia with fluid-filled stomach. 1.7 x 0.7 cm   focus of high attenuation in the distal stomach lumen, which may be due   to ingested content of the small focus of bleed cannot be excluded.    Borderline dilated appendix without significant inflammatory change.   Recommend clinical correlation to assess appendicitis    Prominent wall of the transverse, descending and sigmoid colon without   significant inflammatory change, which may be due to partial distention.   Recommend cortical correlation to assess colitis.    Indeterminate pancreatic lesion, which can be further characterized on a   nonemergent MRCP/MR.    Additional findings as described.    --- End of Report ---    LISETTE SZYMANSKI MD;Attending Radiologist  This document has been electronically signed. Sep  1 2022 12:59AM    < end of copied text >      CRITICAL CARE TIME SPENT: 42 minutes assessing presenting problems of acute illness, which pose high probability of life threatening deterioration or end organ damage/dysfunction, as well as medical decision making including initiating plan of care, reviewing data, reviewing radiologic exams, discussing with multidisciplinary team,  discussing goals of care with patient/family, and writing this note.  Non-inclusive of procedures performed,

## 2022-09-01 NOTE — CONSULT NOTE ADULT - ASSESSMENT
89 year old male with alzheimer/dementia and recent gastric volvulus with recurrence coffee-grind-emesis with stable HH, large hiatal hernia, and no signs of obstruction or inflammation on CT. Lactate elevated- IV hydration being given.     At this time, emergent endoscopic evaluation is not clinically warranted.   Would suggest supportive care and hydration.

## 2022-09-01 NOTE — CONSULT NOTE ADULT - ASSESSMENT
89 year old male with alzheimer/dementia and recent gastric volvulus with recurrence coffee-grind-emesis with stable HH, large hiatal hernia, and no signs of obstruction or inflammation on CT. Lactate elevated- IV hydration being given.     Pt is DNR/DNI, comfort care with hospice discharge on last admission  High risk of morbidity and mortality  Discussed with daughter NOK, does not wish any surgical intervention  No surgical intervention indicated, deemed futile  Recommend supportive care with hydration and comfort care measure  Recommend hospice placement inpatient vs outpatient  Thank you for the consult    Plan discussed with Dr Aviles

## 2022-09-01 NOTE — PROGRESS NOTE ADULT - ASSESSMENT
Aspiration Pneumonia  Incarcerated Gastric Hernia  Agonal respiratory  daughter at bedside, comfort only

## 2022-09-01 NOTE — ED ADULT NURSE REASSESSMENT NOTE - NS ED NURSE REASSESS COMMENT FT1
Patient ordered for another dose of Zofran for nausea. Upon going to flush the IV I noticed that the IV had infiltrated. Normal Saline bolus was discontinued, IV was removed, MD notified and warm packs applied. 2 New IV started, 22GA in the right forearm and 20GA in the left AC.

## 2022-09-01 NOTE — H&P ADULT - NSHPLABSRESULTS_GEN_ALL_CORE
CONTRAST/COMPLICATIONS:  IV Contrast: Omnipaque 350  90 cc administered   10 cc discarded    COMPARISON: 8/24/2022.    FINDINGS: Streak artifact degrades images.    LOWER CHEST: Atelectasis. Coronary artery and aortic valve calcification.   Trace pericardial fluid. Pacemaker leads. Bilateral gynecomastia.    LIVER: Subcentimeter hypodense foci, too small to characterize.  GALLBLADDER/BILE DUCTS: No intrahepatic or extrahepatic biliary   dilatation. No significant gallbladder edema.  PANCREAS: Stable indeterminate lesion at the uncinate process.  SPLEEN: Unremarkable.    ADRENALS: Stable nodular thickening of the left adrenal gland.  KIDNEYS/URETERS: Mild bilateral perinephric stranding without   hydroureteronephrosis. Again noted, bilateral renal cysts and   subcentimeter hypodense foci, too small to characterize.  BLADDER: Distended.  REPRODUCTIVE ORGANS: Enlarged prostate indenting the bladder neck. Right   hydrocele.    BOWEL: Again noted, large hiatal hernia with fluid-filled stomach. 1.7 x   0.7 cm focus of high attenuation in the distal stomach lumen. No bowel   obstruction. Borderline dilated appendix. Prominent wall of the   transverse, descending and sigmoid colon without significant inflammatory   change. Colon diverticulosis.  PERITONEUM: No drainable fluid collection or free air.  VESSELS: Atherosclerosis.Unremarkable.Normal caliber of the abdominal   aorta.  RETROPERITONEUM: No lymphadenopathy.Subcentimeter lymph nodes without   lymphadenopathy.  ABDOMINAL WALL/SOFT TISSUES: Unremarkable.  BONES: Osteopenia. Transitional lumbosacral anatomy. S-shaped curvature   and degenerative changes of the spine. Again noted, multiple compression   fractures of the thoracolumbar spine. Again noted, bilateral rib   deformities.    IMPRESSION:    Persistent large hiatal hernia with fluid-filled stomach. 1.7 x 0.7 cm   focus of high attenuation in the distal stomach lumen, which may be due   to ingested content of the small focus of bleed cannot be excluded.    Borderline dilated appendix without significant inflammatory change.   Recommend clinical correlation to assess appendicitis    Prominent wall of the transverse, descending and sigmoid colon without   significant inflammatory change, which may be due to partial distention.   Recommend cortical correlation to assess colitis.    Indeterminate pancreatic lesion, which can be further characterized on a   nonemergent MRCP/MR.    Additional findings as described.    --- End of Report ---

## 2022-09-01 NOTE — H&P ADULT - ENMT COMMENTS
HPI Comments:   7:16 AM   Morgan Catalan is a 9 y.o. female presenting to the ED with mother C/O right ear pain onset last night. Pt mother explains that pt had strep recently and is currently on Cefdinir 250 mg/5 mL at 3.8 mL and mother gave  abx drops to pt without relief. Pt is being treated for strep currently with Cefdinir 250 mg/5 mL at 3.8 mL a dose twice a day. and is tolerating the abx tx for it. Pt normally takes allergy medication Zyrtec. Allergy reported to Penicillin. PMHx of asthma reported. Pt is exposed to passive smoke. Pt denies any other associated signs and sx. Patient is a 9 y.o. female presenting with ear pain. The history is provided by the patient and the mother. No  was used. Pediatric Social History:  Caregiver: Parent    Ear Pain   This is a new problem. The current episode started 6 to 12 hours ago. The problem occurs constantly. Pertinent negatives include no chest pain and no abdominal pain. Nothing relieves the symptoms. Past Medical History:   Diagnosis Date    Ear infection        Past Surgical History:   Procedure Laterality Date    HX TYMPANOSTOMY           History reviewed. No pertinent family history. Social History     Social History    Marital status: SINGLE     Spouse name: N/A    Number of children: N/A    Years of education: N/A     Occupational History    Not on file. Social History Main Topics    Smoking status: Passive Smoke Exposure - Never Smoker    Smokeless tobacco: Not on file    Alcohol use Not on file    Drug use: Not on file    Sexual activity: Not on file     Other Topics Concern    Not on file     Social History Narrative         ALLERGIES: Penicillins    Review of Systems   Constitutional: Negative for activity change, appetite change and fever. HENT: Positive for ear pain. Negative for congestion and sore throat. Eyes: Negative for pain and redness.    Respiratory: Negative for cough and wheezing. Cardiovascular: Negative for chest pain. Gastrointestinal: Negative for abdominal pain, diarrhea and vomiting. Genitourinary: Negative for difficulty urinating and dysuria. Skin: Negative for pallor and rash. Psychiatric/Behavioral: Negative for behavioral problems. All other systems reviewed and are negative. Vitals:    06/04/17 0648   Pulse: 97   Resp: 24   Temp: 98.7 °F (37.1 °C)   SpO2: 99%   Weight: 27.4 kg   Height: (!) 132 cm            Physical Exam   Constitutional: She appears well-developed and well-nourished. She is active. No distress. HENT:   Head: Normocephalic and atraumatic. Right Ear: There is swelling. No drainage or tenderness. No pain on movement. No mastoid tenderness. Tympanic membrane is abnormal. No middle ear effusion. Left Ear: No drainage, swelling or tenderness. No pain on movement. No mastoid tenderness. Tympanic membrane is normal.  No middle ear effusion. Nose: Congestion present. No rhinorrhea. Mouth/Throat: Mucous membranes are moist. No oropharyngeal exudate, pharynx swelling, pharynx erythema or pharynx petechiae. No tonsillar exudate. Oropharynx is clear. Displaced tympanostomy tube. Right ear TM buldging with erythema. Mild nasal congestion. Eyes: Right eye exhibits no discharge. Left eye exhibits no discharge. Neck: Normal range of motion. Neck supple. No adenopathy. Cardiovascular: Normal rate and regular rhythm. Pulses are palpable. Pulmonary/Chest: Effort normal and breath sounds normal. No accessory muscle usage, nasal flaring or stridor. No respiratory distress. Air movement is not decreased. She has no decreased breath sounds. She has no wheezes. She has no rhonchi. She has no rales. She exhibits no retraction. Musculoskeletal: Normal range of motion. She exhibits no tenderness or deformity. Neurological: She is alert. Skin: Skin is warm. No petechiae, no purpura and no rash noted. She is not diaphoretic.  No cyanosis. Nursing note and vitals reviewed. MDM  Number of Diagnoses or Management Options  Right otitis media with effusion:   Diagnosis management comments: INITIAL CLINICAL IMPRESSION and PLANS:  The patient presents with the primary complaint(s) of: ear pain. The presentation, to include historical aspects and clinical findings are consistent with the DX of Otitis media. However, other possible DX's to consider as primary, associated with, or exacerbated by include:    Pharyngitis, URI       Amount and/or Complexity of Data Reviewed  Obtain history from someone other than the patient: yes (Mother)      ED Course       Procedures    PROGRESS NOTE:   7:16 AM  Initial assessment completed. Written by Adis Valente ED Scribe, as dictated by Caroline Bray MD.     Patient stable in the ED. Mom will continue Omnicef and follow-up with PCP for further evaluation. DISCHARGE NOTE:  Araseli George's  results have been reviewed with her mother. She has been counseled regarding her diagnosis, treatment, and plan. She verbally conveys understanding and agreement of the signs, symptoms, diagnosis, treatment and prognosis and additionally agrees to follow up as discussed. She also agrees with the care-plan and conveys that all of her questions have been answered. I have also provided discharge instructions for her that include: educational information regarding their diagnosis and treatment, and list of reasons why they would want to return to the ED prior to their follow-up appointment, should her condition change. The patient and/or family have been provided with education for proper Emergency Department utilization. CLINICAL IMPRESSION:    1.  Right otitis media with effusion        AFTER VISIT PLAN:    Discharge Medication List as of 6/4/2017  7:47 AM           Follow-up Information     Follow up With Details Comments Contact Info    Nikhil Valdez MD Schedule an appointment as soon as possible for a visit For Pediatric Follow Up [facility] On license of [facility]  [phone]             Attestations: This note is prepared by Liam Aguiar, acting as Scribe for Araceli Ceron MD.    Araceli Ceron MD: The scribe's documentation has been prepared under my direction and personally reviewed by me in its entirety. I confirm that the note above accurately reflects all work, treatment, procedures, and medical decision making performed by me. dry oral mucosa

## 2022-09-01 NOTE — CONSULT NOTE ADULT - NS ATTEND AMEND GEN_ALL_CORE FT
89 year old man with dementia admitted for coffee grind emesis.     h/h stable.   lactate rising fast. CT without findings.   goals of care, palliative care, would not recommend anything aggressive here

## 2022-09-01 NOTE — H&P ADULT - NSHPPHYSICALEXAM_GEN_ALL_CORE
Vital Signs Last 24 Hrs  T(C): 36.1 (01 Sep 2022 01:57), Max: 36.6 (31 Aug 2022 21:18)  T(F): 97 (01 Sep 2022 01:57), Max: 97.9 (31 Aug 2022 21:18)  HR: 62 (01 Sep 2022 04:05) (62 - 85)  BP: 193/83 (01 Sep 2022 04:05) (157/108 - 204/93)  BP(mean): 111 (01 Sep 2022 04:05) (103 - 127)  RR: 13 (01 Sep 2022 04:05) (12 - 18)  SpO2: 97% (01 Sep 2022 04:05) (94% - 99%)    Parameters below as of 01 Sep 2022 04:05  Patient On (Oxygen Delivery Method): room air

## 2022-09-01 NOTE — PATIENT PROFILE ADULT - LANGUAGE ASSISTANCE NEEDED
Pt is a poor historian, confused/No-Patient/Caregiver offered and refused free interpretation services.

## 2022-09-01 NOTE — PROVIDER CONTACT NOTE (CRITICAL VALUE NOTIFICATION) - ACTION/TREATMENT ORDERED:
Dr. Taylor notified and aware. MD to come to evaluate patient.
Dr. Taylor notified and aware. Bolus infusing. Abx to be hung. Will continue to monitor.
Dr. Taylor notified and aware. LR bolus given. Second LR bolus to be given. Will Continue to monitor.

## 2022-09-01 NOTE — H&P ADULT - HISTORY OF PRESENT ILLNESS
89 year old male w Alzheimer dementia, BPH, CKD, DM, HLD, HTN, hx falls, prior GI bleed due to volvulous  came to ED by EMS for abdominal pain and coffee ground emesis      Per EMS pt has had moderate amount of coffee ground emesis since 18:00 pm.   /91   T 97.3    99% O2 sat   When his last BM was is not known as pt walks to [place] w walker   daughter indicated he usually has constipation so is on a regimen  Per daughter, he has been c/o nausea, abd pain and chest pain from earlier in the day.   Has not been himself for 1 to 1 1/2 weeks    Was sent to ED 08-24 to evaluate vomiting of food.  CT no obstruction    In  +coffee grounds due to volvulus.      In ED /94   HR 81    RR 16    T 97.6    98% sat RA  CT no obstruction + large HH w debris that might be food or blood  vomited several times in ED  some c/o abdominal pain  protonix drip  elevated lactate  NS 1000 cc x 2      PAST MEDICAL HX  Bradycardia   OLIVER (dementia of Alzheimer type)   Diabetes mellitus II  GI bleed w coffee ground emesis and volvulous   Glaucoma   History of BPH   HLD (hyperlipidemia)   HTN (hypertension)         PAST SURGICAL HX  S/P hernia repair     FAMILY HX  Known health problems: none  pt and daughter have hiatal hernias      SOCIAL HX  nonsmoker  able to use walker to go short distance to bath room  for greater distances uses a wheelchair

## 2022-09-01 NOTE — CONSULT NOTE ADULT - SUBJECTIVE AND OBJECTIVE BOX
Patient is a 89y old  Male who presents with a chief complaint of Upper GI bleed  Coffee ground emesis (01 Sep 2022 10:45)    HPI:  This is a 89 year old male w Alzheimer dementia, BPH, CKD, DM, HLD, HTN, hx falls, prior GI bleed due to volvulus  came to ED by EMS for abdominal pain and coffee ground emesis. Had episodes CGE with EMS. Patient poor historian. With history constipation. No reports of melena, hematochezia. Does not take any blood thinning agents. Evaluated recently by our service here at  for CGE r/t gastric volvulus. Patient has a very large hiatal hernia.   CT without evidence of inflammation or obstruction.     PAST MEDICAL & SURGICAL HISTORY:  HTN (hypertension)      HLD (hyperlipidemia)      Diabetes      History of BPH      OLIVER (dementia of Alzheimer type)      Bradycardia      Glaucoma      S/P hernia repair      MEDICATIONS  (STANDING):  artificial  tears Solution 1 Drop(s) Both EYES two times a day  cefepime   IVPB      cefepime   IVPB 2000 milliGRAM(s) IV Intermittent once  cefepime   IVPB 2000 milliGRAM(s) IV Intermittent every 8 hours  lactated ringers Bolus 1950 milliLiter(s) IV Bolus once  metroNIDAZOLE  IVPB      metroNIDAZOLE  IVPB 500 milliGRAM(s) IV Intermittent once  metroNIDAZOLE  IVPB 500 milliGRAM(s) IV Intermittent every 8 hours  pantoprazole Infusion 8 mG/Hr (10 mL/Hr) IV Continuous <Continuous>  QUEtiapine 25 milliGRAM(s) Oral daily  tamsulosin 0.4 milliGRAM(s) Oral at bedtime  timolol 0.5% Solution 1 Drop(s) Both EYES two times a day    MEDICATIONS  (PRN):  acetaminophen     Tablet .. 650 milliGRAM(s) Oral every 6 hours PRN Temp greater or equal to 38C (100.4F), Mild Pain (1 - 3)  aluminum hydroxide/magnesium hydroxide/simethicone Suspension 30 milliLiter(s) Oral every 4 hours PRN Dyspepsia  haloperidol    Injectable 2 milliGRAM(s) IntraMuscular every 6 hours PRN agitation  hydrALAZINE Injectable 10 milliGRAM(s) IV Push every 8 hours PRN BP sys > 160  LORazepam   Injectable 1 milliGRAM(s) IV Push every 4 hours PRN Agitation  melatonin 3 milliGRAM(s) Oral at bedtime PRN Insomnia  ondansetron Injectable 4 milliGRAM(s) IV Push every 8 hours PRN Nausea and/or Vomiting    Allergies    penicillins (Other)  tetracyclines (Other)    Intolerances    SOCIAL HISTORY:    FAMILY HISTORY:  Known health problems: none        REVIEW OF SYSTEMS:  Unable to obtain due to dementia    Vital Signs Last 24 Hrs  T(C): 36.3 (01 Sep 2022 12:22), Max: 36.6 (31 Aug 2022 21:18)  T(F): 97.3 (01 Sep 2022 12:22), Max: 97.9 (31 Aug 2022 21:18)  HR: 94 (01 Sep 2022 12:22) (60 - 94)  BP: 142/70 (01 Sep 2022 12:22) (142/70 - 217/99)  BP(mean): 106 (01 Sep 2022 07:07) (103 - 133)  RR: 18 (01 Sep 2022 12:22) (12 - 18)  SpO2: 99% (01 Sep 2022 12:22) (94% - 100%)    Parameters below as of 01 Sep 2022 12:22  Patient On (Oxygen Delivery Method): nasal cannula  O2 Flow (L/min): 2    PHYSICAL EXAM:    Constitutional: No acute distress, frail non-toxic appearing  HEENT: masked, good phonation, not icteric  Neck: supple, no lymphadenopathy  Respiratory: clear to ascultation bilaterally, no wheezing  Cardiovascular: S1 and S2, regular rate and rhythm  Gastrointestinal: soft, tender in epigastric area, moderately distended,  no guarding or peritoneal sign, no surgical scars, no drains  Neurological: A/O x 1, no focal deficits, no asterixis  Psychiatric: Normal mood, normal affect  Skin: No rashes, not jaundiced    LABS:                        12.4   10.98 )-----------( 164      ( 01 Sep 2022 11:34 )             37.6     09-01    140  |  106  |  20  ----------------------------<  169<H>  3.5   |  22  |  1.63<H>    Ca    9.4      01 Sep 2022 11:34  Mg     2.3     08-31    TPro  6.3  /  Alb  3.2<L>  /  TBili  0.5  /  DBili  x   /  AST  15  /  ALT  16  /  AlkPhos  73  09-01    PT/INR - ( 31 Aug 2022 21:29 )   PT: 12.3 sec;   INR: 1.06 ratio         PTT - ( 31 Aug 2022 21:29 )  PTT:31.3 sec  LIVER FUNCTIONS - ( 01 Sep 2022 11:34 )  Alb: 3.2 g/dL / Pro: 6.3 gm/dL / ALK PHOS: 73 U/L / ALT: 16 U/L / AST: 15 U/L / GGT: x             RADIOLOGY & ADDITIONAL STUDIES:    < from: CT Abdomen and Pelvis w/ IV Cont (08.31.22 @ 23:46) >  IMPRESSION:    Persistent large hiatal hernia with fluid-filled stomach. 1.7 x 0.7 cm   focus of high attenuation in the distal stomach lumen, which may be due   to ingested content of the small focus of bleed cannot be excluded.    Borderline dilated appendix without significant inflammatory change.   Recommend clinical correlation to assess appendicitis    Prominent wall of the transverse, descending and sigmoid colon without   significant inflammatory change, which may be due to partial distention.   Recommend cortical correlation to assess colitis.    Indeterminate pancreatic lesion, which can be further characterized on a   nonemergent MRCP/MR.    Additional findings as described.    < end of copied text >

## 2022-09-01 NOTE — PROVIDER CONTACT NOTE (CHANGE IN STATUS NOTIFICATION) - ACTION/TREATMENT ORDERED:
Rapid response team at bedside. Dr. Taylor at bedside. NGT placed for comfort to stop pt from vomiting. NGT output 850ml. Rapid response team at bedside. Dr. Taylor at bedside. NGT placed for comfort to stop pt from vomiting. NGT output 850ml. CXR ordered for placement.

## 2022-09-01 NOTE — H&P ADULT - ASSESSMENT
89 year old male w Alzheimer dementia, BPH, CKD, DM, HLD, HTN, hx falls, prior GI bleed due to volvulous  came to ED by EMS for abdominal pain and coffee ground emesis      #Coffee ground emesis  #Upper GI bleed  #suspect delayed gastric empyting from DM  no evidence for obstruction on CT  prior GI bleed was due to volvuloua  1. admit to med  2. NS 1000 cc x 2  3. protonix gtt  4. trend H/H  5. GI consulted  6. NPO except for med  7. zofran prn  8. elevate HOB  9. try Mg 1 g IV to relax smooth muscle x 1  10. not sure if pt would benefit from IV zithromax  am avoiding reglan, compazine at this time  11. if vomiting continues may need NGT      #Elevated BP  only med listed is lasix from med rec  1.    89 year old male w Alzheimer dementia, BPH, CKD, DM, HLD, HTN, hx falls, prior GI bleed due to volvulous  came to ED by EMS for abdominal pain and coffee ground emesis      #Coffee ground emesis  #Upper GI bleed  #suspect delayed gastric empyting from DM  no evidence for obstruction on CT  prior GI bleed was due to volvuloua  1. admit to med  2. NS 1000 cc x 2  3. protonix gtt  4. trend H/H  5. GI consulted  6. NPO except for med  7. zofran prn  8. elevate HOB  9. try Mg 1 g IV to relax smooth muscle x 1  10. not sure if pt would benefit from IV zithromax  am avoiding reglan, compazine at this time  11. if vomiting continues may need NGT    #HTN  #Elevated BP  only med listed is lasix from med rec  1. hydralazine 10 mg q 8 hr prn BP sys > 160      #Alzheimers  no meds  1. fall risk        #BPH  1. tamsulsoin      #Hx falls  fall risk as above        Goals of Care  pt is a DNR/DNI   MOLST updated 89 year old male w Alzheimer dementia, BPH, CKD, DM, HLD, HTN, hx falls, prior GI bleed due to volvulous  came to ED by EMS for abdominal pain and coffee ground emesis      #Coffee ground emesis  #Upper GI bleed  #suspect delayed gastric empyting from DM  no evidence for obstruction on CT  prior GI bleed was due to volvuloua  1. admit to med  2. NS 1000 cc x 2  3. protonix gtt  4. trend H/H  5. GI consulted  6. NPO except for med  7. zofran prn  8. elevate HOB  9. try Mg 1 g IV to relax smooth muscle x 1  10. not sure if pt would benefit from IV zithromax  am avoiding reglan, compazine at this time  11. if vomiting continues may need NGT      #Elevated lactic acid  2.6 NS 2000 cc increased to 3.1  1. NS @ 100 cc/hr  2. trend    #HTN  #Elevated BP  only med listed is lasix from med rec  1. hydralazine 10 mg q 8 hr prn BP sys > 160      #Alzheimers  no meds  1. fall risk        #BPH  1. tamsulsoin      #Hx falls  fall risk as above        Goals of Care  pt is a DNR/DNI   MOLST updated

## 2022-09-01 NOTE — PATIENT PROFILE ADULT - NSPROHARDOFHEAROTHER_GEN_ALL_CORE
daughter lizandro states pt does not have his hearing device but able to hear better  from his left ear vs his right

## 2022-09-01 NOTE — PATIENT PROFILE ADULT - FALL HARM RISK - HARM RISK INTERVENTIONS

## 2022-09-02 DIAGNOSIS — F03.90 UNSPECIFIED DEMENTIA WITHOUT BEHAVIORAL DISTURBANCE: ICD-10-CM

## 2022-09-02 PROCEDURE — 99221 1ST HOSP IP/OBS SF/LOW 40: CPT

## 2022-09-02 PROCEDURE — 99232 SBSQ HOSP IP/OBS MODERATE 35: CPT

## 2022-09-02 RX ADMIN — HALOPERIDOL DECANOATE 2 MILLIGRAM(S): 100 INJECTION INTRAMUSCULAR at 22:04

## 2022-09-02 RX ADMIN — PANTOPRAZOLE SODIUM 10 MG/HR: 20 TABLET, DELAYED RELEASE ORAL at 23:42

## 2022-09-02 RX ADMIN — MORPHINE SULFATE 2 MILLIGRAM(S): 50 CAPSULE, EXTENDED RELEASE ORAL at 07:02

## 2022-09-02 RX ADMIN — MORPHINE SULFATE 2 MILLIGRAM(S): 50 CAPSULE, EXTENDED RELEASE ORAL at 07:17

## 2022-09-02 RX ADMIN — MORPHINE SULFATE 2 MILLIGRAM(S): 50 CAPSULE, EXTENDED RELEASE ORAL at 13:18

## 2022-09-02 RX ADMIN — MORPHINE SULFATE 2 MILLIGRAM(S): 50 CAPSULE, EXTENDED RELEASE ORAL at 13:33

## 2022-09-02 RX ADMIN — PANTOPRAZOLE SODIUM 10 MG/HR: 20 TABLET, DELAYED RELEASE ORAL at 02:13

## 2022-09-02 RX ADMIN — Medication 1 MILLIGRAM(S): at 23:38

## 2022-09-02 RX ADMIN — PANTOPRAZOLE SODIUM 10 MG/HR: 20 TABLET, DELAYED RELEASE ORAL at 13:18

## 2022-09-02 RX ADMIN — MORPHINE SULFATE 2 MILLIGRAM(S): 50 CAPSULE, EXTENDED RELEASE ORAL at 04:00

## 2022-09-02 RX ADMIN — MORPHINE SULFATE 2 MILLIGRAM(S): 50 CAPSULE, EXTENDED RELEASE ORAL at 03:30

## 2022-09-02 RX ADMIN — HALOPERIDOL DECANOATE 2 MILLIGRAM(S): 100 INJECTION INTRAMUSCULAR at 04:10

## 2022-09-02 NOTE — CONSULT NOTE ADULT - ASSESSMENT
89 year old male with PMH of Alzheimer dementia, BPH, CKD, DM, HLD, HTN, hx Falls, Prior GI bleed due to Volvulus (6/22) BIBA for abdominal pain and coffee ground emesis.    1) GIB with coffee ground emesis   - incarcerated Gastric Hernia   - patient has history of gastric volvulus recently admitted for   - surgery consult appreciated - not surgical candidate   - NGT to LCS   - as per notes patients family does not want to pursue any agreesive interventions  - GI consult appreciated     2) aspiration   - NPO   - c/w NGT   - Head of bed elevated     Process of Care  --Reviewed dx/treatment problems and alignment with Goals of Care    Physical Aspects of Care  --Pain  patient denies at this time  c/w current management - Morphine 2mg will increase to q4hrs PRN    --Dyspnea  No SOB at this time  comfortable and in NAD    --Nausea Vomiting  - NGT inplace       Psychological and Psychiatric Aspects of Care:   --Greif/Bereavment: emotional support provided  --Hx of psychiatric dx: none  -Pastoral Care Available PRN     Social Aspects of Care  -SW involved     Cultural Aspects  -Primary Language: English    Goals of Care:     We discussed Palliative Care team being a supportive team when a patient has ongoing illnesses.  We also discussed that it is not an end of life care service, but can help navigate symptoms and emotional support througout their hospital stay here.    Hospice was explained as well  as an end of life care philosophy.  When a disease cannot be cured, or family/patient decide the treatment burdens out weigh the risk and one choses to change focus of treatment from cure to quality/comfort.     Prognosis: Death can occur at anytime, but if disease continues to progress naturally patient likely has days to weeks.    Ethical and Legal Aspects:   NA    Capacity- pt does not have capacity     HCP/Surrogate: Daughter Kala HCP on [facility]     Code Status- DNR/I   MOLST- on chart   Dispo Plan- ongoing, recommend Hospice     Discussed With: Dr. Tatiana Chairez coordinated with attending and SW and RN     Time Spent: 45 minutes including the care, coordination and counseling of this patient, 50% of which was spent coordinating and counseling.

## 2022-09-02 NOTE — GOALS OF CARE CONVERSATION - ADVANCED CARE PLANNING - CONVERSATION DETAILS
HPI:  89 year old male w Alzheimer dementia, BPH, CKD, DM, HLD, HTN, hx falls, prior GI bleed due to volvulous  came to ED by EMS for abdominal pain and coffee ground emesis   (01 Sep 2022 06:18)      PERTINENT PMH REVIEWED:  [ X ] YES [ ] NO           Primary Contact:  Radha Anderson, daughter, health care agent, phone #[phone]    HCP [ X  ] Surrogate [   ] Guardian [   ]    Mental Status: [ ] Alert  [  ] Oriented [  X] Confused [  ] Lethargic [  ] -Pt lacks capacity  Concerns of Depression [  ] -not identified  Anxiety [   ] -not identified  Baseline ADLs (prior to admission):  Independent [ ] moderately [ ] fully   Dependent   [ X ] moderately [ ]fully     Family Meeting attendees: GOC held with daughter via telephone 9/2.    Anticipated Grief: Patient[  ] Family [ X ]    Caregiver Villanueva Assessed: Yes [ X ] No [  ]    Confucianism: Voodoo    Spiritual Concerns: Not identified    Goals of Care: Comfort [ X ] Rehabilitation [  ] Curative [  ] Life Prolonging [  ]    Previous Services: LTC at [facility]    ADVANCE DIRECTIVES:   -Pt lacks capacity  -[facility] naming Radha as primary agent and Ricarda as alternate  -MOLST: DNR, DNI    Anticipated D/C Plan: To be determined.                     Summary:  Sofía Padilla, Palliative NP and this SW spoke with daughter Radha via telephone this date to discuss GOC, assist with planning and provide supportive counseling.  Palliative role explained.  Pt resides LTC at [facility].  Daughters feelings explored.  Support provided.    Pt currently has NG tube and has IV for pain medication at this time.  We discussed the philosophy of hospice with the recommendations for inpatient hospice at this time.  We educated daughter that Pt is able to go to [facility] inpatient hospice with NG tube.  Daughter inquired about [facility] inpatient hospice as she lives in [place], [facility] inpatient hospice will not accept Pt with NG tube, they stated they would suction Pt if needed.      Daughter states she desires referral to [facility] [place] for inpatient hospice and desires removal of NG tube.  Daughter states that NG tube is painful and not a quality of life to her dad noting he is trying to pull the tube out.  Daughter aware of pts poor prognosis and condition can continue to worsen at anytime.      Advance directives reviewed.  [facility] naming Radha as primary agent and Ricarda as alternate agent.  MOLST on file to reflect DNR/DNI.  At this time daughter desires to remove NG tube with a Comfort focus.      At this time plan to refer to [facility] [place] for inpatient hospice.  Emotional support provided.  Our team to continue to follow.

## 2022-09-02 NOTE — CONSULT NOTE ADULT - SUBJECTIVE AND OBJECTIVE BOX
HPI: 89 year old male with PMH of Alzheimer dementia, BPH, CKD, DM, HLD, HTN, hx Falls, Prior GI bleed due to Volvulus (6/22) BIBA for abdominal pain and coffee ground emesis. Per patients daughter, he has been complaining of nausea, abdominal pain and chest pain since yesterday morning. Admits he has not himself for the past week.  Patient was brought 9/1/2022 to ED, patient mildly hypertensive  persistent hiatal hernia with debris of ingested content. Continues to complain of abdominal pain. The patient was recently sent to the ED for increased confusion, nausea, abdominal pain, r/o volvulus. CT AP negative for acute pathology, positive constipation and sliding hiatal hernia. Patient was transported back to NH. Patient admitted to medicine under Dr. Taylor. Started on Protonix gtt and GI consulted. ICU consulted for sepsis. Patient lactate has been rising last was 10.3. Palliative medicine consulted to help establish GOC and advance care planning     9/2/2022 Patient seen and examined with no family at bedside, patient confused with history of dementia, patient appear comfortable at this time has received IV morphine  3 doses past 24 hours, on 1:1 as patient needed NGT placed this AM. Patient more awake today, will reach out to family to schedule GOC meeting     PAIN: (x )Yes   ( )No  as per RN staff - patient appears comfortable at this time     DYSPNEA: ( ) Yes  (x ) No    PAST MEDICAL & SURGICAL HISTORY:  HTN (hypertension)  HLD (hyperlipidemia)  Diabetes  History of BPH  OLIVER (dementia of Alzheimer type)  Bradycardia  Glaucoma  S/P hernia repair    SOCIAL HX: [facility]      Hx opiate tolerance ( )YES  (x )NO    Baseline ADLs  (Prior to Admission)  ( ) Independent   (x )Dependent    FAMILY HISTORY:  Known health problems: none    Review of Systems:    Unable to obtain/Limited due to: Dementia     PHYSICAL EXAM:    Vital Signs Last 24 Hrs  T(C): 36.6 (02 Sep 2022 08:31), Max: 36.9 (02 Sep 2022 00:02)  T(F): 97.9 (02 Sep 2022 08:31), Max: 98.5 (02 Sep 2022 00:02)  HR: 89 (02 Sep 2022 08:31) (84 - 95)  BP: 108/51 (02 Sep 2022 08:31) (90/56 - 142/70)  RR: 17 (02 Sep 2022 08:31) (17 - 18)  SpO2: 96% (02 Sep 2022 08:31) (96% - 99%)    Parameters below as of 02 Sep 2022 08:31  Patient On (Oxygen Delivery Method): nasal cannula  O2 Flow (L/min): 4    PPSV2: 20%  FAST: 7c    General: elderly frail male in bed, NAD   Mental Status: alert but history of dementia   HEENT: dry oral mucosa, NGT in place   Lungs: lungs clear b/l   Cardiac: s1s2 +   GI: non tender, non distended, +BS   Ext: no edema  Neuro: history of dementia     LABS:                        12.4   10.98 )-----------( 164      ( 01 Sep 2022 11:34 )             37.6     09-01    140  |  106  |  20  ----------------------------<  169<H>  3.5   |  22  |  1.63<H>    Ca    9.4      01 Sep 2022 11:34  Mg     2.3     08-31    TPro  6.3  /  Alb  3.2<L>  /  TBili  0.5  /  DBili  x   /  AST  15  /  ALT  16  /  AlkPhos  73  09-01    PT/INR - ( 31 Aug 2022 21:29 )   PT: 12.3 sec;   INR: 1.06 ratio    PTT - ( 31 Aug 2022 21:29 )  PTT:31.3 sec  Albumin: Albumin, Serum: 3.2 g/dL (09-01 @ 11:34)    Allergies    penicillins (Other)  tetracyclines (Other)    Intolerances    MEDICATIONS  (STANDING):  pantoprazole Infusion 8 mG/Hr (10 mL/Hr) IV Continuous <Continuous>    MEDICATIONS  (PRN):  acetaminophen     Tablet .. 650 milliGRAM(s) Oral every 6 hours PRN Temp greater or equal to 38C (100.4F), Mild Pain (1 - 3)  aluminum hydroxide/magnesium hydroxide/simethicone Suspension 30 milliLiter(s) Oral every 4 hours PRN Dyspepsia  haloperidol    Injectable 2 milliGRAM(s) IntraMuscular every 6 hours PRN agitation  hydrALAZINE Injectable 10 milliGRAM(s) IV Push every 8 hours PRN BP sys > 160  LORazepam   Injectable 1 milliGRAM(s) IV Push every 4 hours PRN Agitation  morphine  - Injectable 2 milliGRAM(s) IV Push every 4 hours PRN pain sob  ondansetron Injectable 4 milliGRAM(s) IV Push every 8 hours PRN Nausea and/or Vomiting      RADIOLOGY/ADDITIONAL STUDIES:    ACC: [id number] EXAM:  XR CHEST PORTABLE URGENT 1V                        ACC: [id number] EXAM:  XR KUB 1 VIEW                        PROCEDURE DATE:  09/01/2022    INTERPRETATION:  Abdomen and chest. Concern is bowel perforation.  Portable supine abdominal study. 2 images.  Contrast in the distended bladder from CAT scan noted.  Not much gas is seen involving scarring.  There is a right lumbar curve with degeneration. No signs of free   intraperitoneal air.  AP chest on September1, 2022 at 7:07 PM.  Heart magnified by technique. Left-sided pacemaker again noted.  On August 31 there are bilateral infiltrates mild in degree in the right   upper lobe and in the left lower lung field. There is also a rather large   hiatal hernia.  On present examination there is a diffusely increased right lung   infiltrate.  In addition an NG tube is been inserted. The tip is not well seen.    IMPRESSION: No free intraperitoneal air. Diffusely increased right lung   infiltrate. Other findings stable as above.  NG tube inserted. Tip not well identified.      ACC: [id number] EXAM:  CT ABDOMEN AND PELVIS IC                        PROCEDURE DATE:  08/31/2022    INTERPRETATION:  CLINICAL INFORMATION: hx gastric volvulus, now with   coffee ground emesis.  PROCEDURE:  Helical axial images were obtained from the domes of the diaphragm   through the pubic symphysis following the administration of intravenous   contrast. Coronal and sagittal reformats were also obtained.  CONTRAST/COMPLICATIONS:  IV Contrast: Omnipaque 350  90 cc administered   10cc discarded  Oral Contrast: NONE  Complications: None reported at time of study completion  COMPARISON: 8/24/2022.  FINDINGS: Streak artifact degrades images.  LOWER CHEST: Atelectasis. Coronary artery and aortic valve calcification.   Trace pericardial fluid. Pacemaker leads. Bilateral gynecomastia.  LIVER: Subcentimeter hypodense foci, too small to characterize.  GALLBLADDER/BILE DUCTS: No intrahepatic or extrahepatic biliary   dilatation. No significant gallbladder edema.  PANCREAS: Stable indeterminate lesion at the uncinate process.  SPLEEN: Unremarkable.  ADRENALS: Stable nodular thickening of the left adrenal gland.  KIDNEYS/URETERS: Mild bilateral perinephric stranding without   hydroureteronephrosis. Again noted, bilateralrenal cysts and   subcentimeter hypodense foci, too small to characterize.  BLADDER: Distended.  REPRODUCTIVE ORGANS: Enlarged prostate indenting the bladder neck. Right   hydrocele.  BOWEL: Again noted, large hiatal hernia with fluid-filled stomach. 1.7 x   0.7 cm focus of high attenuation in the distal stomach lumen. No bowel   obstruction. Borderline dilated appendix. Prominent wall of the   transverse, descending and sigmoid colon without significant inflammatory   change. Colon diverticulosis.  PERITONEUM: No drainable fluid collection or free air.  VESSELS: Atherosclerosis.Unremarkable.Normal caliber of the abdominal   aorta.  RETROPERITONEUM: No lymphadenopathy.Subcentimeter lymph nodes without   lymphadenopathy.  ABDOMINAL WALL/SOFTTISSUES: Unremarkable.  BONES: Osteopenia. Transitional lumbosacral anatomy. S-shaped curvature   and degenerative changes of the spine. Again noted, multiple compression   fractures of the thoracolumbar spine. Again noted, bilateral rib   deformities.    IMPRESSION:    Persistent large hiatal hernia with fluid-filled stomach. 1.7 x 0.7 cm   focus of high attenuation in the distal stomach lumen, which may be due   to ingested content of the small focus of bleed cannot be excluded.    Borderline dilated appendix without significant inflammatory change.   Recommend clinical correlation to assess appendicitis    Prominent wall of the transverse, descending and sigmoid colon without   significant inflammatory change, which may be due to partial distention.   Recommend cortical correlation to assess colitis.    Indeterminate pancreatic lesion, which can be further characterized on a   nonemergent MRCP/MR.    Additional findings as described.

## 2022-09-02 NOTE — CONSULT NOTE ADULT - REASON FOR ADMISSION
Upper GI bleed  Coffee ground emesis

## 2022-09-02 NOTE — CHART NOTE - NSCHARTNOTEFT_GEN_A_CORE
Message left for daughter Radha via telephone to discuss GOC, assist with planning and provide supportive counseling.  Pt does not have capacity therefore SW contacted daughter to discuss GOC.  Awaiting call back.  Our team to follow.
Lactate 10.9 despite LR sepsis   Another bolus ordered. Start empiric Cefepime Flagyl  Reconsult ICU
S:    Pt seen and examined with SHANNON Luna    In essence  89M   PMHx Alzheimer dementia, BPH, CKD, DM, HLD, HTN, hx falls, prior GI bleed due to volvulus    came to ED by EMS for abdominal pain and coffee ground emesis    Admitted for GIB this AM 9/1    ICU consult for lactemia    O:    Stable hemodynamics  SpO2 near 100% on RA    Elderly M sitting in bed  No JVD trachea midline  Abd soft NTND  No leg swelling/edema noted  Awake and alert, some confusion    CT read as without acute surgical pathology, although maybe colitis?    WBC 10    Cr 1.42---> known CKD    LA 2->3->4->7.4    A/P:    Lactemia, ? source  GIB    P:    Repeat, trend LA  IVF  GI consult  If LA remains elevated, consider surgery consult, as Hx of voluvlus although abd exam benign at this time and CT w/o read of volvulus    Discussed with Dr Taylor    No need for upgrade to ICU lvl of care at this time    Please call ICU svc if any addl concerns or questions

## 2022-09-03 PROCEDURE — 99232 SBSQ HOSP IP/OBS MODERATE 35: CPT

## 2022-09-03 RX ADMIN — Medication 1 MILLIGRAM(S): at 10:45

## 2022-09-03 RX ADMIN — MORPHINE SULFATE 2 MILLIGRAM(S): 50 CAPSULE, EXTENDED RELEASE ORAL at 14:26

## 2022-09-03 RX ADMIN — MORPHINE SULFATE 2 MILLIGRAM(S): 50 CAPSULE, EXTENDED RELEASE ORAL at 14:11

## 2022-09-03 RX ADMIN — PANTOPRAZOLE SODIUM 10 MG/HR: 20 TABLET, DELAYED RELEASE ORAL at 09:29

## 2022-09-03 RX ADMIN — Medication 1 MILLIGRAM(S): at 21:49

## 2022-09-03 RX ADMIN — PANTOPRAZOLE SODIUM 10 MG/HR: 20 TABLET, DELAYED RELEASE ORAL at 20:43

## 2022-09-03 NOTE — PROVIDER CONTACT NOTE (OTHER) - SITUATION
Pt O2 saturation no greater than 89% despite being on 4L O2 NC.
DR. GIRON'S ANSWERING SERVICE AWARE OF CONSULT.

## 2022-09-04 ENCOUNTER — TRANSCRIPTION ENCOUNTER (OUTPATIENT)
Age: 87
End: 2022-09-04

## 2022-09-04 VITALS
OXYGEN SATURATION: 96 % | DIASTOLIC BLOOD PRESSURE: 74 MMHG | RESPIRATION RATE: 16 BRPM | HEART RATE: 113 BPM | SYSTOLIC BLOOD PRESSURE: 122 MMHG

## 2022-09-04 PROCEDURE — 99238 HOSP IP/OBS DSCHRG MGMT 30/<: CPT

## 2022-09-04 RX ORDER — FUROSEMIDE 40 MG
1 TABLET ORAL
Qty: 0 | Refills: 0 | DISCHARGE

## 2022-09-04 RX ORDER — FAMOTIDINE 10 MG/ML
1 INJECTION INTRAVENOUS
Qty: 0 | Refills: 0 | DISCHARGE

## 2022-09-04 RX ORDER — SENNOSIDES/DOCUSATE SODIUM 8.6MG-50MG
2 TABLET ORAL
Qty: 0 | Refills: 0 | DISCHARGE

## 2022-09-04 RX ORDER — ACETAMINOPHEN 500 MG
1 TABLET ORAL
Qty: 0 | Refills: 0 | DISCHARGE

## 2022-09-04 RX ORDER — POLYETHYLENE GLYCOL 3350 17 G/17G
17 POWDER, FOR SOLUTION ORAL
Qty: 0 | Refills: 0 | DISCHARGE

## 2022-09-04 NOTE — DISCHARGE NOTE NURSING/CASE MANAGEMENT/SOCIAL WORK - NSDCPEFALRISK_GEN_ALL_CORE
For information on Fall & Injury Prevention, visit: [email or web address] OR  [email or web address] OR  https://www.cdc.gov/steadi/patient.html

## 2022-09-04 NOTE — PROGRESS NOTE ADULT - SUBJECTIVE AND OBJECTIVE BOX
CODE STATUS:dnr  Hospital day #3        HPI: This is a 89 year old male w Alzheimer dementia, BPH, CKD, DM, HLD, HTN, hx falls, prior GI bleed due to volvulus  came to ED by EMS for abdominal pain and coffee ground emesis. Had episodes CGE with EMS. Patient poor historian. With history constipation. No reports of melena, hematochezia. Does not take any blood thinning agents. Evaluated recently by our service here at  for CGE r/t gastric volvulus. Patient has a very large hiatal hernia.   CT without evidence of inflammation or obstruction.  Set for hospice    Vital Signs Last 24 Hrs  T(C): 36.4 (03 Sep 2022 09:08), Max: 37.6 (03 Sep 2022 00:02)  T(F): 97.5 (03 Sep 2022 09:08), Max: 99.6 (03 Sep 2022 00:02)  HR: 63 (03 Sep 2022 09:08) (63 - 105)  BP: 117/53 (03 Sep 2022 09:08) (117/53 - 132/64)  BP(mean): --  RR: 16 (03 Sep 2022 09:08) (16 - 18)  SpO2: 90% (03 Sep 2022 09:08) (90% - 98%)    Parameters below as of 03 Sep 2022 09:08  Patient On (Oxygen Delivery Method): nasal cannula  O2 Flow (L/min): 4          Gen restless,   HEENT nc at perrla  Neck supple no JVD  Chest CTA  CVS s1s2 reg, no m/g/r  Abd soft nt/nd + BS   voiding  Neuro non focal mot str 5/5 all extr  Skin no rash  Musculoskeletal FROM        Labs: no labs            A/P:    * UGIB emesis and aspiration of gastric content  prn morphine ativan          
Responded to RRT for vomitting, aspiration, agonal breathing  daughter at bedside  comfort measures only  dnr/DNI  NGT placed to stop him from vomitting.  d/w Dr. Taylor
CODE STATUS:dnr  Hospital day #2        HPI: This is a 89 year old male w Alzheimer dementia, BPH, CKD, DM, HLD, HTN, hx falls, prior GI bleed due to volvulus  came to ED by EMS for abdominal pain and coffee ground emesis. Had episodes CGE with EMS. Patient poor historian. With history constipation. No reports of melena, hematochezia. Does not take any blood thinning agents. Evaluated recently by our service here at  for CGE r/t gastric volvulus. Patient has a very large hiatal hernia.   CT without evidence of inflammation or obstruction. Pt had emesis and aspiration 24H ago; was agonally breathing he aspirated; at that time d/w daughter that it was unlikely that he will survive, pt was lethargic agonal breathing. He remained stable and is more awake today. Daughter not sure what to do ( hospice).    Vital Signs Last 24 Hrs  T(C): 36.6 (02 Sep 2022 08:31), Max: 36.9 (02 Sep 2022 00:02)  T(F): 97.9 (02 Sep 2022 08:31), Max: 98.5 (02 Sep 2022 00:02)  HR: 89 (02 Sep 2022 08:31) (84 - 95)  BP: 108/51 (02 Sep 2022 08:31) (90/56 - 142/70)  BP(mean): --  RR: 17 (02 Sep 2022 08:31) (17 - 18)  SpO2: 96% (02 Sep 2022 08:31) (96% - 99%)    Parameters below as of 02 Sep 2022 08:31  Patient On (Oxygen Delivery Method): nasal cannula  O2 Flow (L/min): 4        Gen restless, NGT dark coffee ground  HEENT nc at perrla  Neck supple no JVD  Chest CTA  CVS s1s2 reg, no m/g/r  Abd soft nt/nd + BS   voiding  Neuro non focal mot str 5/5 all extr  Skin no rash  Musculoskeletal FROM        Labs: no labs            A/P:    * UGIB emesis and aspiration of gastric content  it seemed unlikely he will survive and prn Morphine was adm  daughter will d/w fam about IVF and some Rx vs hospice; i advised for hospice since he will become ill again, she will not consent for any procedures not even EGD; she will let me know after d/w fam  for now no IVF c/w PPI drip; to decide if she wants antibiotic also          
CODE STATUS: dnr  Hospital day # 1    Called by RN pt agitated code grey called; I evaluated pt earlier when lactated was reported 7.3 and call ICU for consult. Pt was pleasant, c/o mild epig pain stated he had some N/V last time last night. 4 people are holding him down; Ativan will be administered. Case d/w daughter she is aware I asked her to come to the hospital and maybe her presence will halp him.    Vital Signs Last 24 Hrs  T(C): 35.9 (01 Sep 2022 10:27), Max: 36.6 (31 Aug 2022 21:18)  T(F): 96.6 (01 Sep 2022 10:27), Max: 97.9 (31 Aug 2022 21:18)  HR: 81 (01 Sep 2022 10:27) (60 - 85)  BP: 183/85 (01 Sep 2022 10:27) (157/108 - 217/99)  BP(mean): 106 (01 Sep 2022 07:07) (103 - 133)  RR: 18 (01 Sep 2022 10:27) (12 - 18)  SpO2: 100% (01 Sep 2022 10:27) (94% - 100%)    Parameters below as of 01 Sep 2022 10:27  Patient On (Oxygen Delivery Method): nasal cannula  O2 Flow (L/min): 2        Gen agitated, being held down   HEENT nc at perrla  Neck supple no JVD  Chest CTA  CVS s1s2 reg, no m/g/r  Abd soft nt/nd + BS   voiding  Neuro non focal mot str 5/5 all extr  Skin no rash  Musculoskeletal FROM    Home Medications:  acetaminophen 500 mg oral tablet: 1 tab(s) orally every 4 hours, As Needed for minor pain/ temp &gt; 101 (01 Sep 2022 06:47)  Artificial Tears ophthalmic solution: 1 drop(s) to each affected eye 2 times a day (01 Sep 2022 06:47)  famotidine 20 mg oral tablet: 1 tab(s) orally 2 times a day (01 Sep 2022 06:47)  furosemide 20 mg oral tablet: 1 tab(s) orally every other day (01 Sep 2022 06:47)  polyethylene glycol 3350 oral powder for reconstitution: 17 gram(s) orally once a day (01 Sep 2022 06:47)  Senna Plus 50 mg-8.6 mg oral tablet: 2 tab(s) orally 2 times a day (01 Sep 2022 06:47)  tamsulosin 0.4 mg oral capsule: 1 cap(s) orally once a day (at bedtime) (01 Sep 2022 06:47)  timolol maleate 0.5% ophthalmic solution: 1 drop(s) to each affected eye 2 times a day (01 Sep 2022 06:47)      Labs:                           13.2   10.44 )-----------( 178      ( 01 Sep 2022 06:33 )             39.2   09-01    140  |  107  |  20  ----------------------------<  185<H>  3.8   |  27  |  1.42<H>    Ca    9.5      01 Sep 2022 06:33  Mg     2.3     08-31    TPro  7.2  /  Alb  3.6  /  TBili  0.5  /  DBili  x   /  AST  13<L>  /  ALT  20  /  AlkPhos  82  08-31       12 Lead ECG (08.31.22 @ 21:26) >  Normal sinus rhythm  Left axis deviation  Inferior infarct (cited on or before 31-AUG-2022)  Cannot rule out Anterior infarct (cited on or before 31-AUG-2022)        RADIOLOGY:     CT Abdomen and Pelvis w/ IV Cont (08.31.22 @ 23:46) >  Persistent large hiatal hernia with fluid-filled stomach. 1.7 x 0.7 cm   focus of high attenuation in the distal stomach lumen, which may be due   to ingested content of the small focus of bleed cannot be excluded.    Borderline dilated appendix without significant inflammatory change.   Recommend clinical correlation to assess appendicitis    Prominent wall of the transverse, descending and sigmoid colon without   significant inflammatory change, which may be due to partial distention.   Recommend cortical correlation to assess colitis.    Indeterminate pancreatic lesion, which can be further characterized on a   nonemergent MRCP/MR.    Additional findings as described.        A/P:    * Severe toxic metabolic encephalopathy with rising lactate  check bladder scan  sepsis bolus- pt received only 2 L in ED; repeat lactate after LR  prn Haldol; if not enough Ativan and add Seroquel  doubt infection   no evidence of further bleed  repeat noon labs  
CODE STATUS:dnr  Hospital day #4        HPI: This is a 89 year old male w Alzheimer dementia, BPH, CKD, DM, HLD, HTN, hx falls, prior GI bleed due to volvulus  came to ED by EMS for abdominal pain and coffee ground emesis. Had episodes CGE with EMS. Patient poor historian. With history constipation. No reports of melena, hematochezia. Does not take any blood thinning agents. Evaluated recently by our service here at  for CGE r/t gastric volvulus. Patient has a very large hiatal hernia.   CT without evidence of inflammation or obstruction.  Set for hospice daughter blocked transfer yesterday wants to keep him here another day to watch him    Vital Signs Last 24 Hrs  T(C): 36.3 (04 Sep 2022 05:30), Max: 36.4 (03 Sep 2022 21:08)  T(F): 97.4 (04 Sep 2022 05:30), Max: 97.6 (03 Sep 2022 21:08)  HR: 113 (04 Sep 2022 08:50) (62 - 126)  BP: 122/74 (04 Sep 2022 08:50) (122/60 - 133/83)  BP(mean): --  RR: 16 (04 Sep 2022 08:50) (16 - 16)  SpO2: 96% (04 Sep 2022 08:50) (96% - 99%)    Parameters below as of 04 Sep 2022 08:50  Patient On (Oxygen Delivery Method): nasal cannula  O2 Flow (L/min): 4          Gen restless,   HEENT nc at perrla  Neck supple no JVD  Chest CTA  CVS s1s2 reg, no m/g/r  Abd soft nt/nd + BS   voiding  Neuro non focal mot str 5/5 all extr  Skin no rash  Musculoskeletal FROM          A/P:    * UGIB emesis and aspiration of gastric content  prn morphine ativan  start food per daughter request

## 2022-09-04 NOTE — PROGRESS NOTE ADULT - REASON FOR ADMISSION
Upper GI bleed  Coffee ground emesis

## 2022-09-04 NOTE — DISCHARGE NOTE NURSING/CASE MANAGEMENT/SOCIAL WORK - PATIENT PORTAL LINK FT
You can access the FollowMyHealth Patient Portal offered by [facility] by registering at the following website: [email or web address]. By joining [facility]’s FollowMyHealth portal, you will also be able to view your health information using other applications (apps) compatible with our system.

## 2022-09-04 NOTE — DISCHARGE NOTE PROVIDER - HOSPITAL COURSE
89 year old male w Alzheimer dementia, BPH, CKD, DM, HLD, HTN, hx falls, prior GI bleed due to volvulus  came to ED by EMS for abdominal pain and coffee ground emesis. Had episodes CGE with EMS. Patient poor historian. With history constipation. No reports of melena, hematochezia. Does not take any blood thinning agents. Evaluated recently by our service here at  for CGE r/t gastric volvulus. Patient has a very large hiatal hernia.   CT without evidence of inflammation or obstruction.  Set for hospice daughter blocked transfer yesterday wants to keep him here another day to watch him    Vital Signs Last 24 Hrs  T(C): 36.3 (04 Sep 2022 05:30), Max: 36.4 (03 Sep 2022 21:08)  T(F): 97.4 (04 Sep 2022 05:30), Max: 97.6 (03 Sep 2022 21:08)  HR: 113 (04 Sep 2022 08:50) (62 - 126)  BP: 122/74 (04 Sep 2022 08:50) (122/60 - 133/83)  BP(mean): --  RR: 16 (04 Sep 2022 08:50) (16 - 16)  SpO2: 96% (04 Sep 2022 08:50) (96% - 99%)    Parameters below as of 04 Sep 2022 08:50  Patient On (Oxygen Delivery Method): nasal cannula  O2 Flow (L/min): 4          Gen restless,   HEENT nc at perrla  Neck supple no JVD  Chest CTA  CVS s1s2 reg, no m/g/r  Abd soft nt/nd + BS   voiding  Neuro non focal mot str 5/5 all extr  Skin no rash  Musculoskeletal FROM          A/P:    * UGIB emesis and aspiration of gastric content  prn morphine ativan  start food per daughter request      MEDICATIONS  (PRN):  haloperidol    Injectable 2 milliGRAM(s) IntraMuscular every 6 hours PRN agitation  LORazepam   Injectable 1 milliGRAM(s) IV Push every 4 hours PRN Agitation  morphine  - Injectable 2 milliGRAM(s) IV Push every 4 hours PRN pain sob  ondansetron Injectable 4 milliGRAM(s) IV Push every 8 hours PRN Nausea and/or Vomiting    Hospice meds will be decided there this is what we use

## 2022-09-04 NOTE — DISCHARGE NOTE PROVIDER - NSDCCPCAREPLAN_GEN_ALL_CORE_FT
PRINCIPAL DISCHARGE DIAGNOSIS  Diagnosis: GI bleed  Assessment and Plan of Treatment: transfer to hospice

## 2022-09-06 LAB
CULTURE RESULTS: SIGNIFICANT CHANGE UP
CULTURE RESULTS: SIGNIFICANT CHANGE UP
SPECIMEN SOURCE: SIGNIFICANT CHANGE UP
SPECIMEN SOURCE: SIGNIFICANT CHANGE UP

## 2022-09-08 DIAGNOSIS — Z95.0 PRESENCE OF CARDIAC PACEMAKER: ICD-10-CM

## 2022-09-08 DIAGNOSIS — E78.5 HYPERLIPIDEMIA, UNSPECIFIED: ICD-10-CM

## 2022-09-08 DIAGNOSIS — Z88.0 ALLERGY STATUS TO PENICILLIN: ICD-10-CM

## 2022-09-08 DIAGNOSIS — E86.0 DEHYDRATION: ICD-10-CM

## 2022-09-08 DIAGNOSIS — F02.80 DEMENTIA IN OTHER DISEASES CLASSIFIED ELSEWHERE, UNSPECIFIED SEVERITY, WITHOUT BEHAVIORAL DISTURBANCE, PSYCHOTIC DISTURBANCE, MOOD DISTURBANCE, AND ANXIETY: ICD-10-CM

## 2022-09-08 DIAGNOSIS — N18.9 CHRONIC KIDNEY DISEASE, UNSPECIFIED: ICD-10-CM

## 2022-09-08 DIAGNOSIS — E11.22 TYPE 2 DIABETES MELLITUS WITH DIABETIC CHRONIC KIDNEY DISEASE: ICD-10-CM

## 2022-09-08 DIAGNOSIS — K92.2 GASTROINTESTINAL HEMORRHAGE, UNSPECIFIED: ICD-10-CM

## 2022-09-08 DIAGNOSIS — G92.8 OTHER TOXIC ENCEPHALOPATHY: ICD-10-CM

## 2022-09-08 DIAGNOSIS — K59.00 CONSTIPATION, UNSPECIFIED: ICD-10-CM

## 2022-09-08 DIAGNOSIS — G30.9 ALZHEIMER'S DISEASE, UNSPECIFIED: ICD-10-CM

## 2022-09-08 DIAGNOSIS — Z88.1 ALLERGY STATUS TO OTHER ANTIBIOTIC AGENTS STATUS: ICD-10-CM

## 2022-09-08 DIAGNOSIS — Z91.81 HISTORY OF FALLING: ICD-10-CM

## 2022-09-08 DIAGNOSIS — Z51.5 ENCOUNTER FOR PALLIATIVE CARE: ICD-10-CM

## 2022-09-08 DIAGNOSIS — Z66 DO NOT RESUSCITATE: ICD-10-CM

## 2022-09-08 DIAGNOSIS — K44.9 DIAPHRAGMATIC HERNIA WITHOUT OBSTRUCTION OR GANGRENE: ICD-10-CM

## 2022-09-08 DIAGNOSIS — K30 FUNCTIONAL DYSPEPSIA: ICD-10-CM

## 2022-09-08 DIAGNOSIS — I12.9 HYPERTENSIVE CHRONIC KIDNEY DISEASE WITH STAGE 1 THROUGH STAGE 4 CHRONIC KIDNEY DISEASE, OR UNSPECIFIED CHRONIC KIDNEY DISEASE: ICD-10-CM

## 2022-09-08 DIAGNOSIS — E11.69 TYPE 2 DIABETES MELLITUS WITH OTHER SPECIFIED COMPLICATION: ICD-10-CM

## 2022-09-08 DIAGNOSIS — H40.9 UNSPECIFIED GLAUCOMA: ICD-10-CM

## 2022-09-08 DIAGNOSIS — N40.0 BENIGN PROSTATIC HYPERPLASIA WITHOUT LOWER URINARY TRACT SYMPTOMS: ICD-10-CM

## 2022-09-08 DIAGNOSIS — Z20.822 CONTACT WITH AND (SUSPECTED) EXPOSURE TO COVID-19: ICD-10-CM

## 2022-09-08 DIAGNOSIS — J69.0 PNEUMONITIS DUE TO INHALATION OF FOOD AND VOMIT: ICD-10-CM

## 2023-03-20 NOTE — PROGRESS NOTE ADULT - PROBLEM/PLAN-1
DISPLAY PLAN FREE TEXT
concerns answered at this time. Coronary artery disease  Asymptomatic. Continue Asa and statin therapy. Essential hypertension  Controlled. Continue current medical managgment. Hyperlipidemia  Continue statin therapy. Hemorrhagic CVA  Currently in a wheelchair and experiencing tremors.         Plan    Watchman
DISPLAY PLAN FREE TEXT
DISPLAY PLAN FREE TEXT

## 2023-08-18 NOTE — ED PROVIDER NOTE - NSICDXPASTSURGICALHX_GEN_ALL_CORE_FT
Dr. Caraballo is reaching out to Dr. Esparza office to inquire about stopping amlodipine and increased carvedilol to 25 mg two times daily.     Stop taking Xarelto.     Wait to hear from Dr. Esparza before making any changes.   
PAST SURGICAL HISTORY:  S/P hernia repair

## 2024-03-13 NOTE — CONSULT NOTE ADULT - ASSESSMENT
CT chest done - no nodules   89 year old male with PMH of Alzheimer dementia, BPH, CKD, DM, HLD, HTN, hx Falls, Prior GI bleed due to Volvulus (6/22) BIBA for abdominal pain and coffee ground emesis presents with:    1. Elevated Lactic Acid r/o Sepsis    -Elevated lactic acid in setting of stable hemodynamics, benign abdominal exam and CT AP negative for acute pathology likely not volvulus. Recommend repeat lactate. If patient status changes would reconsider re-scanning patient.   - Afebrile, no leukocytosis, but lactate elevated and uptrending on admission. Recommend sepsis workup with UA, UCx, BCx, fluid bolus and empiric abx coverage. Patient not in shock given stable hemodynamics, not requiring pressors at this time.   -Recommend gentle hydration with NS @75/hr while NPO in setting of dehydration, s/p 1L NS bolus x2 in ED. Official speech and swallow for aspiration precautions in setting of hiatal hernia.     Patient seen and examined at bedside. At this time not requiring ICU level care, please contact if patient status worsens.  89 year old male with PMH of Alzheimer dementia, BPH, CKD, DM, HLD, HTN, hx Falls, Prior GI bleed due to Volvulus (6/22) BIBA for abdominal pain and coffee ground emesis presents with:    1. Elevated Lactic Acid r/o Sepsis    -Elevated lactic acid in setting of stable hemodynamics, benign abdominal exam, CT AP from yesterdays admission reviewed, negative for acute pathology but parth distended bladder and worsening hiatal hernia. Recommend place Rodriguez catheter for bladder decompression, NGT placement, and repeat/trend lactate. If patient status changes or lactemia worsens recommend consult Surgery for r/o ischemic bowel. Consider re-scanning patient with IV contrast.   - Afebrile, no leukocytosis, but lactate elevated and uptrending on admission. Recommend sepsis workup with UA, UCx, BCx, fluid bolus and empiric abx coverage. Patient not in shock given stable hemodynamics, not requiring pressors at this time.   -Recommend gentle hydration with NS @75/hr while NPO in setting of dehydration, s/p 1L NS bolus x2 in ED. Official speech and swallow for aspiration precautions in setting of hiatal hernia.     Patient seen and examined at bedside. At this time not requiring ICU level care, please contact if patient status worsens.

## 2024-06-06 NOTE — SWALLOW BEDSIDE ASSESSMENT ADULT - ORAL PHASE
extended munch mash pattern for chewable with debris. otherwise oral phase is wfl for thin liquid and for soft solid/Within functional limits No

## 2024-06-18 NOTE — PATIENT PROFILE ADULT - DO YOU FEEL LIKE HURTING YOURSELF OR OTHERS?
Kim Zarco presents today for follow up of HTN, Diabetes, CKD    Current Outpatient Medications   Medication Sig Dispense Refill    pregabalin (LYRICA) 50 MG capsule take 1 capsule by mouth twice a day for pain for 30 DAYS      niacin (NIASPAN) 500 MG extended release tablet take 1 tablet by mouth nightly at bedtime 90 tablet 0    pantoprazole (PROTONIX) 40 MG tablet take 1 tablet by mouth once daily 90 tablet 0    HUMALOG MIX 75/25 KWIKPEN (75-25) 100 UNIT/ML SUPN injection pen inject 78 units subcutaneously twice a day with meals 15 Adjustable Dose Pre-filled Pen Syringe 0    dulaglutide (TRULICITY) 0.75 MG/0.5ML SOPN SC injection 0.75 weekly 2 mL 2    famotidine (PEPCID) 40 MG tablet take 1 tablet by mouth every morning 90 tablet 0    lisinopril (PRINIVIL;ZESTRIL) 40 MG tablet take 1 tablet by mouth once daily 90 tablet 0    albuterol sulfate HFA (VENTOLIN HFA) 108 (90 Base) MCG/ACT inhaler Inhale 2 puffs into the lungs every 6 hours as needed for Wheezing 18 g 2    Continuous Blood Gluc Sensor (FREESTYLE MAGAILS 2 SENSOR) MISC 1 each by Does not apply route every 14 days 1 each 5    ezetimibe (ZETIA) 10 MG tablet Take 1 tablet by mouth daily 90 tablet 0    metoprolol tartrate (LOPRESSOR) 50 MG tablet Take 1 tablet by mouth 2 times daily 180 tablet 0    vitamin C (ASCORBIC ACID) 500 MG tablet Take 1 tablet by mouth daily 90 tablet 0    potassium chloride (KLOR-CON M) 10 MEQ extended release tablet take 1 tablet by mouth every other day 45 tablet 3    Fesoterodine Fumarate ER 8 MG TB24 Take 1 tablet by mouth daily      fluticasone-umeclidin-vilant (TRELEGY ELLIPTA) 200-62.5-25 MCG/ACT AEPB inhaler Inhale 1 puff into the lungs daily 1 each 2    citalopram (CELEXA) 20 MG tablet Take 6 tablets by mouth daily (Patient taking differently: Take 2 tablets by mouth daily) 120 tablet 0    pravastatin (PRAVACHOL) 40 MG tablet Take 1 tablet by mouth nightly 90 tablet 3    B-D UF III MINI PEN NEEDLES 31G X 5 MM MISC USE once  no

## 2024-07-18 NOTE — PHYSICAL THERAPY INITIAL EVALUATION ADULT - NS ASR RISK AREAS PT EVAL
Patient has been notified of the note below via [employer]. Reminder set for 3 days to send letter if not read.   
Pt called and states that she would like to now go up to the 10 mg mounjaro dose . Could provider please send script over for this increased dose       Thank you    Sophy Welch  Pharmacy Tech.  [facility]  [phone]   
Refill sent. Please reach out to schedule follow-up visit.     Kyleigh Nichols PA-C    
fall

## 2025-05-19 NOTE — PATIENT PROFILE ADULT - CAREGIVER NAME
----- Message from Kami sent at 5/19/2025 10:58 AM CDT -----  Regarding: Aliza(wife)  Caller is requesting to schedule their Lab appointment prior to annual appointment.  Order is not listed in EPIC.  Please enter order and contact patient to schedule.Name of Caller:Aliza(wife)Preferred Date and Time of Labs: As soon as possibleDate of EPP Appointment:Where would they like the lab performed? [place] locationWould the patient rather a call back or a response via [facility]? callbackBe Call Back Number: [phone] Information:   Karen Anderson

## 2025-07-17 NOTE — ED PROVIDER NOTE - CCCP TRG CHIEF CMPLNT
[Initial Evaluation] : an initial evaluation [Pituitary Evaluation/ Disorder] : pituitary evaluation/disorder fall